# Patient Record
Sex: FEMALE | Race: WHITE | Employment: FULL TIME
[De-identification: names, ages, dates, MRNs, and addresses within clinical notes are randomized per-mention and may not be internally consistent; named-entity substitution may affect disease eponyms.]

---

## 2017-02-02 ENCOUNTER — OFFICE VISIT (OUTPATIENT)
Dept: FAMILY MEDICINE CLINIC | Facility: CLINIC | Age: 32
End: 2017-02-02

## 2017-02-02 VITALS
HEIGHT: 66 IN | SYSTOLIC BLOOD PRESSURE: 105 MMHG | TEMPERATURE: 98.3 F | BODY MASS INDEX: 27.8 KG/M2 | DIASTOLIC BLOOD PRESSURE: 70 MMHG | WEIGHT: 173 LBS | OXYGEN SATURATION: 99 % | HEART RATE: 94 BPM

## 2017-02-02 PROCEDURE — 99213 OFFICE O/P EST LOW 20 MIN: CPT | Performed by: NURSE PRACTITIONER

## 2017-02-02 RX ORDER — IBUPROFEN 800 MG/1
TABLET ORAL
Refills: 0 | COMMUNITY
Start: 2016-11-30 | End: 2017-02-23 | Stop reason: ALTCHOICE

## 2017-02-02 RX ORDER — VITAMIN A ACETATE, .BETA.-CAROTENE, ASCORBIC ACID, CHOLECALCIFEROL, .ALPHA.-TOCOPHEROL ACETATE, DL-, THIAMINE MONONITRATE, RIBOFLAVIN, NIACINAMIDE, PYRIDOXINE HYDROCHLORIDE, FOLIC ACID, CYANOCOBALAMIN, CALCIUM CARBONATE, FERROUS FUMARATE, ZINC OXIDE, AND CUPRIC OXIDE 2000; 2000; 120; 400; 22; 1.84; 3; 20; 10; 1; 12; 200; 27; 25; 2 [IU]/1; [IU]/1; MG/1; [IU]/1; MG/1; MG/1; MG/1; MG/1; MG/1; MG/1; UG/1; MG/1; MG/1; MG/1; MG/1
TABLET ORAL
COMMUNITY
Start: 2017-01-16 | End: 2017-10-24 | Stop reason: ALTCHOICE

## 2017-02-02 ASSESSMENT — ENCOUNTER SYMPTOMS
RHINORRHEA: 0
ABDOMINAL PAIN: 0
COUGH: 0
SHORTNESS OF BREATH: 0
SINUS PRESSURE: 0
EYE DISCHARGE: 0
BLOOD IN STOOL: 0
WHEEZING: 0

## 2017-02-10 ENCOUNTER — TELEPHONE (OUTPATIENT)
Dept: FAMILY MEDICINE CLINIC | Facility: CLINIC | Age: 32
End: 2017-02-10

## 2017-02-15 ENCOUNTER — TELEPHONE (OUTPATIENT)
Dept: FAMILY MEDICINE CLINIC | Facility: CLINIC | Age: 32
End: 2017-02-15

## 2017-02-23 ENCOUNTER — OFFICE VISIT (OUTPATIENT)
Dept: FAMILY MEDICINE CLINIC | Facility: CLINIC | Age: 32
End: 2017-02-23

## 2017-02-23 VITALS
HEIGHT: 66 IN | OXYGEN SATURATION: 98 % | TEMPERATURE: 98.2 F | SYSTOLIC BLOOD PRESSURE: 99 MMHG | DIASTOLIC BLOOD PRESSURE: 63 MMHG | HEART RATE: 97 BPM | BODY MASS INDEX: 27.16 KG/M2 | WEIGHT: 169 LBS

## 2017-02-23 DIAGNOSIS — Z00.00 PREVENTATIVE HEALTH CARE: ICD-10-CM

## 2017-02-23 DIAGNOSIS — R53.83 FATIGUE, UNSPECIFIED TYPE: ICD-10-CM

## 2017-02-23 PROCEDURE — 99213 OFFICE O/P EST LOW 20 MIN: CPT | Performed by: NURSE PRACTITIONER

## 2017-02-23 ASSESSMENT — ENCOUNTER SYMPTOMS
COUGH: 0
VISUAL CHANGE: 0
WHEEZING: 0
SHORTNESS OF BREATH: 0
ABDOMINAL PAIN: 0
BLOOD IN STOOL: 0
EYE DISCHARGE: 0
SINUS PRESSURE: 0
RHINORRHEA: 0

## 2017-03-30 ENCOUNTER — OFFICE VISIT (OUTPATIENT)
Dept: FAMILY MEDICINE CLINIC | Age: 32
End: 2017-03-30
Payer: COMMERCIAL

## 2017-03-30 VITALS
SYSTOLIC BLOOD PRESSURE: 106 MMHG | TEMPERATURE: 98.1 F | HEART RATE: 85 BPM | WEIGHT: 169.9 LBS | HEIGHT: 66 IN | OXYGEN SATURATION: 99 % | DIASTOLIC BLOOD PRESSURE: 70 MMHG | BODY MASS INDEX: 27.31 KG/M2

## 2017-03-30 DIAGNOSIS — R53.83 FATIGUE, UNSPECIFIED TYPE: ICD-10-CM

## 2017-03-30 DIAGNOSIS — F51.04 PSYCHOPHYSIOLOGICAL INSOMNIA: ICD-10-CM

## 2017-03-30 PROCEDURE — 99214 OFFICE O/P EST MOD 30 MIN: CPT | Performed by: NURSE PRACTITIONER

## 2017-03-30 RX ORDER — LANOLIN ALCOHOL/MO/W.PET/CERES
3 CREAM (GRAM) TOPICAL DAILY
Qty: 30 TABLET | Refills: 1 | Status: SHIPPED | OUTPATIENT
Start: 2017-03-30 | End: 2017-03-30 | Stop reason: CLARIF

## 2017-03-30 ASSESSMENT — ENCOUNTER SYMPTOMS: VISUAL CHANGE: 0

## 2017-05-05 ENCOUNTER — OFFICE VISIT (OUTPATIENT)
Dept: FAMILY MEDICINE CLINIC | Age: 32
End: 2017-05-05
Payer: COMMERCIAL

## 2017-05-05 VITALS
BODY MASS INDEX: 26.68 KG/M2 | OXYGEN SATURATION: 99 % | SYSTOLIC BLOOD PRESSURE: 100 MMHG | HEIGHT: 66 IN | TEMPERATURE: 97.9 F | HEART RATE: 78 BPM | DIASTOLIC BLOOD PRESSURE: 70 MMHG | WEIGHT: 166 LBS

## 2017-05-05 DIAGNOSIS — M54.42 ACUTE MIDLINE LOW BACK PAIN WITH BILATERAL SCIATICA: Primary | ICD-10-CM

## 2017-05-05 DIAGNOSIS — M54.42 CHRONIC MIDLINE LOW BACK PAIN WITH BILATERAL SCIATICA: ICD-10-CM

## 2017-05-05 DIAGNOSIS — G89.29 CHRONIC MIDLINE LOW BACK PAIN WITH BILATERAL SCIATICA: ICD-10-CM

## 2017-05-05 DIAGNOSIS — M54.41 CHRONIC MIDLINE LOW BACK PAIN WITH BILATERAL SCIATICA: ICD-10-CM

## 2017-05-05 DIAGNOSIS — M54.41 ACUTE MIDLINE LOW BACK PAIN WITH BILATERAL SCIATICA: Primary | ICD-10-CM

## 2017-05-05 PROCEDURE — 99213 OFFICE O/P EST LOW 20 MIN: CPT | Performed by: NURSE PRACTITIONER

## 2017-05-05 RX ORDER — DICLOFENAC POTASSIUM 50 MG/1
50 TABLET, FILM COATED ORAL 2 TIMES DAILY
Qty: 60 TABLET | Refills: 0 | Status: SHIPPED | OUTPATIENT
Start: 2017-05-05 | End: 2017-05-19 | Stop reason: ALTCHOICE

## 2017-05-05 ASSESSMENT — ENCOUNTER SYMPTOMS
COUGH: 0
SINUS PRESSURE: 0
BOWEL INCONTINENCE: 0
ABDOMINAL PAIN: 0
SHORTNESS OF BREATH: 0
WHEEZING: 0
BACK PAIN: 1
BLOOD IN STOOL: 0
RHINORRHEA: 0
EYE DISCHARGE: 0

## 2017-05-19 ENCOUNTER — TELEPHONE (OUTPATIENT)
Dept: FAMILY MEDICINE CLINIC | Age: 32
End: 2017-05-19

## 2017-05-19 ENCOUNTER — OFFICE VISIT (OUTPATIENT)
Dept: FAMILY MEDICINE CLINIC | Age: 32
End: 2017-05-19
Payer: COMMERCIAL

## 2017-05-19 VITALS
WEIGHT: 167 LBS | HEIGHT: 66 IN | DIASTOLIC BLOOD PRESSURE: 70 MMHG | BODY MASS INDEX: 26.84 KG/M2 | SYSTOLIC BLOOD PRESSURE: 101 MMHG | OXYGEN SATURATION: 99 % | TEMPERATURE: 97.4 F | HEART RATE: 72 BPM

## 2017-05-19 DIAGNOSIS — M48.061 SPINAL STENOSIS OF LUMBAR REGION: ICD-10-CM

## 2017-05-19 DIAGNOSIS — G89.29 CHRONIC MIDLINE LOW BACK PAIN WITH BILATERAL SCIATICA: Primary | ICD-10-CM

## 2017-05-19 DIAGNOSIS — M54.41 CHRONIC MIDLINE LOW BACK PAIN WITH BILATERAL SCIATICA: Primary | ICD-10-CM

## 2017-05-19 DIAGNOSIS — M54.42 CHRONIC MIDLINE LOW BACK PAIN WITH BILATERAL SCIATICA: Primary | ICD-10-CM

## 2017-05-19 PROCEDURE — 99213 OFFICE O/P EST LOW 20 MIN: CPT | Performed by: NURSE PRACTITIONER

## 2017-05-19 RX ORDER — AMOXICILLIN 875 MG/1
TABLET, COATED ORAL
COMMUNITY
Start: 2017-05-11 | End: 2017-06-02 | Stop reason: ALTCHOICE

## 2017-05-19 RX ORDER — BENZONATATE 200 MG/1
CAPSULE ORAL
COMMUNITY
Start: 2017-05-11 | End: 2017-09-22

## 2017-05-19 RX ORDER — LORATADINE 10 MG/1
TABLET ORAL
COMMUNITY
Start: 2017-05-11 | End: 2017-05-19 | Stop reason: ALTCHOICE

## 2017-05-19 ASSESSMENT — ENCOUNTER SYMPTOMS
RHINORRHEA: 0
BOWEL INCONTINENCE: 0
BLOOD IN STOOL: 0
SHORTNESS OF BREATH: 0
WHEEZING: 0
SINUS PRESSURE: 0
COUGH: 0
BACK PAIN: 1
EYE DISCHARGE: 0
ABDOMINAL PAIN: 0

## 2017-06-02 ENCOUNTER — OFFICE VISIT (OUTPATIENT)
Dept: FAMILY MEDICINE CLINIC | Age: 32
End: 2017-06-02
Payer: COMMERCIAL

## 2017-06-02 VITALS
HEART RATE: 64 BPM | BODY MASS INDEX: 26.84 KG/M2 | WEIGHT: 167 LBS | DIASTOLIC BLOOD PRESSURE: 72 MMHG | OXYGEN SATURATION: 98 % | HEIGHT: 66 IN | TEMPERATURE: 98.2 F | SYSTOLIC BLOOD PRESSURE: 110 MMHG

## 2017-06-02 DIAGNOSIS — M54.41 CHRONIC MIDLINE LOW BACK PAIN WITH BILATERAL SCIATICA: ICD-10-CM

## 2017-06-02 DIAGNOSIS — G89.29 CHRONIC MIDLINE LOW BACK PAIN WITH BILATERAL SCIATICA: ICD-10-CM

## 2017-06-02 DIAGNOSIS — R53.83 FATIGUE, UNSPECIFIED TYPE: ICD-10-CM

## 2017-06-02 DIAGNOSIS — M54.42 CHRONIC MIDLINE LOW BACK PAIN WITH BILATERAL SCIATICA: ICD-10-CM

## 2017-06-02 PROCEDURE — 99214 OFFICE O/P EST MOD 30 MIN: CPT | Performed by: NURSE PRACTITIONER

## 2017-06-02 ASSESSMENT — ENCOUNTER SYMPTOMS
RHINORRHEA: 0
NAUSEA: 1
ABDOMINAL PAIN: 0
BLOOD IN STOOL: 0
EYE DISCHARGE: 0
WHEEZING: 0
SHORTNESS OF BREATH: 0
BACK PAIN: 1
COUGH: 0
SINUS PRESSURE: 0
VISUAL CHANGE: 0

## 2017-09-22 ENCOUNTER — OFFICE VISIT (OUTPATIENT)
Dept: FAMILY MEDICINE CLINIC | Age: 32
End: 2017-09-22
Payer: COMMERCIAL

## 2017-09-22 ENCOUNTER — TELEPHONE (OUTPATIENT)
Dept: FAMILY MEDICINE CLINIC | Age: 32
End: 2017-09-22

## 2017-09-22 VITALS
SYSTOLIC BLOOD PRESSURE: 116 MMHG | BODY MASS INDEX: 27.47 KG/M2 | OXYGEN SATURATION: 98 % | TEMPERATURE: 98.2 F | DIASTOLIC BLOOD PRESSURE: 76 MMHG | HEIGHT: 66 IN | HEART RATE: 76 BPM | WEIGHT: 170.9 LBS

## 2017-09-22 DIAGNOSIS — G89.29 CHRONIC MIDLINE LOW BACK PAIN WITH BILATERAL SCIATICA: ICD-10-CM

## 2017-09-22 DIAGNOSIS — M54.41 CHRONIC MIDLINE LOW BACK PAIN WITH BILATERAL SCIATICA: ICD-10-CM

## 2017-09-22 DIAGNOSIS — E66.3 OVERWEIGHT (BMI 25.0-29.9): ICD-10-CM

## 2017-09-22 DIAGNOSIS — M54.42 CHRONIC MIDLINE LOW BACK PAIN WITH BILATERAL SCIATICA: ICD-10-CM

## 2017-09-22 PROCEDURE — 99214 OFFICE O/P EST MOD 30 MIN: CPT | Performed by: NURSE PRACTITIONER

## 2017-09-22 ASSESSMENT — ENCOUNTER SYMPTOMS
SINUS PRESSURE: 0
RHINORRHEA: 0
BLOOD IN STOOL: 0
BACK PAIN: 1
COUGH: 0
WHEEZING: 0
ABDOMINAL PAIN: 0
SHORTNESS OF BREATH: 0
EYE DISCHARGE: 0

## 2017-09-22 ASSESSMENT — PATIENT HEALTH QUESTIONNAIRE - PHQ9
SUM OF ALL RESPONSES TO PHQ QUESTIONS 1-9: 0
2. FEELING DOWN, DEPRESSED OR HOPELESS: 0
SUM OF ALL RESPONSES TO PHQ9 QUESTIONS 1 & 2: 0
1. LITTLE INTEREST OR PLEASURE IN DOING THINGS: 0

## 2017-10-05 LAB
BASOPHILS ABSOLUTE: ABNORMAL /ΜL
BASOPHILS RELATIVE PERCENT: ABNORMAL %
EOSINOPHILS ABSOLUTE: ABNORMAL /ΜL
EOSINOPHILS RELATIVE PERCENT: ABNORMAL %
HCT VFR BLD CALC: ABNORMAL % (ref 36–46)
HEMOGLOBIN: ABNORMAL G/DL (ref 12–16)
LYMPHOCYTES ABSOLUTE: ABNORMAL /ΜL
LYMPHOCYTES RELATIVE PERCENT: ABNORMAL %
MCH RBC QN AUTO: ABNORMAL PG
MCHC RBC AUTO-ENTMCNC: ABNORMAL G/DL
MCV RBC AUTO: ABNORMAL FL
MONOCYTES ABSOLUTE: ABNORMAL /ΜL
MONOCYTES RELATIVE PERCENT: ABNORMAL %
NEUTROPHILS ABSOLUTE: ABNORMAL /ΜL
NEUTROPHILS RELATIVE PERCENT: ABNORMAL %
PLATELET # BLD: ABNORMAL K/ΜL
PMV BLD AUTO: ABNORMAL FL
RBC # BLD: ABNORMAL 10^6/ΜL
WBC # BLD: ABNORMAL 10^3/ML

## 2017-10-06 ENCOUNTER — OFFICE VISIT (OUTPATIENT)
Dept: FAMILY MEDICINE CLINIC | Age: 32
End: 2017-10-06
Payer: COMMERCIAL

## 2017-10-06 ENCOUNTER — HOSPITAL ENCOUNTER (OUTPATIENT)
Age: 32
Setting detail: SPECIMEN
Discharge: HOME | End: 2017-10-06
Payer: COMMERCIAL

## 2017-10-06 VITALS
HEART RATE: 59 BPM | HEIGHT: 66 IN | SYSTOLIC BLOOD PRESSURE: 112 MMHG | OXYGEN SATURATION: 98 % | TEMPERATURE: 97.8 F | DIASTOLIC BLOOD PRESSURE: 70 MMHG | WEIGHT: 174 LBS | BODY MASS INDEX: 27.97 KG/M2

## 2017-10-06 DIAGNOSIS — D72.829 LEUKOCYTOSIS, UNSPECIFIED TYPE: ICD-10-CM

## 2017-10-06 DIAGNOSIS — K76.9 LIVER LESION: ICD-10-CM

## 2017-10-06 DIAGNOSIS — R10.11 RUQ PAIN: ICD-10-CM

## 2017-10-06 DIAGNOSIS — E87.6 HYPOKALEMIA: Primary | ICD-10-CM

## 2017-10-06 DIAGNOSIS — E87.6 HYPOKALEMIA: ICD-10-CM

## 2017-10-06 LAB — POTASSIUM SERPL-SCNC: 4 MMOL/L (ref 3.7–5.3)

## 2017-10-06 PROCEDURE — 99213 OFFICE O/P EST LOW 20 MIN: CPT | Performed by: NURSE PRACTITIONER

## 2017-10-06 RX ORDER — HYDROCODONE BITARTRATE AND ACETAMINOPHEN 5; 325 MG/1; MG/1
TABLET ORAL
COMMUNITY
Start: 2017-10-05 | End: 2017-10-24 | Stop reason: ALTCHOICE

## 2017-10-06 RX ORDER — FAMOTIDINE 20 MG/1
TABLET, FILM COATED ORAL
Refills: 0 | COMMUNITY
Start: 2017-10-01 | End: 2017-10-24 | Stop reason: ALTCHOICE

## 2017-10-06 RX ORDER — ONDANSETRON 4 MG/1
TABLET, ORALLY DISINTEGRATING ORAL
COMMUNITY
Start: 2017-10-05 | End: 2017-10-24 | Stop reason: ALTCHOICE

## 2017-10-06 RX ORDER — HYDROXYZINE PAMOATE 25 MG/1
CAPSULE ORAL
Refills: 0 | COMMUNITY
Start: 2017-10-01 | End: 2017-10-06 | Stop reason: ALTCHOICE

## 2017-10-06 RX ORDER — PREDNISONE 1 MG/1
TABLET ORAL
Refills: 0 | COMMUNITY
Start: 2017-10-01 | End: 2017-10-24 | Stop reason: ALTCHOICE

## 2017-10-06 ASSESSMENT — ENCOUNTER SYMPTOMS
CONSTIPATION: 0
COUGH: 0
SHORTNESS OF BREATH: 1
DIARRHEA: 0
ANAL BLEEDING: 0
EYE DISCHARGE: 0
ABDOMINAL PAIN: 1
NAUSEA: 1
BLOOD IN STOOL: 0

## 2017-10-06 NOTE — PROGRESS NOTES
through to her back with palp. ruq pain w /palp. No hepatosplenomegaly on exam    Musculoskeletal: Normal range of motion. She exhibits no edema. Neurological: She is alert and oriented to person, place, and time. Skin: Skin is warm and dry. Psychiatric: She has a normal mood and affect. Nursing note and vitals reviewed. /70  Pulse 59  Temp 97.8 °F (36.6 °C) (Oral)   Ht 5' 6\" (1.676 m)  Wt 174 lb (78.9 kg)  SpO2 98%  BMI 28.08 kg/m2    CBC:   Lab Results   Component Value Date    HGB 13.3 12/30/2015    HCT 41.4 12/30/2015     CMP:  No results found for: NA, K, CL, CO2, BUN, CREATININE, GFRAA, AGRATIO, LABGLOM, GLUCOSE, PROT, LABALBU, CALCIUM, BILITOT, ALKPHOS, AST, ALT  No results found for: LABA1C    STUDY: ABDOMEN AND PELVIS CT WITH CONTRAST                   CLINICAL HISTORY: Abdominal pain, upper abdominal pain radiating to          the back with shortness of breath                  COMPARISON: 6/17/2017                  TECHNIQUE: CT abdomen and pelvis was performed utilizing 5 mm axial          reconstructions following the uneventful administration of 97 cc          Omnipaque 300 nonionic intravenous contrast. Coronal and sagittal          reformatted images as well as delayed excretory phase images were          obtained and reviewed. Automated exposure control was utilized.                   FINDINGS:                  Abdomen:                  There is no pleural or pericardial effusion. There is mild dependent          change and atelectasis in the lower lungs.                  There is a hypervascular right hepatic lesion which measures          approximately 2.5 cm. This does not appear to be compatible with a          hemangioma. Findings could relate to focal nodular hyperplasia are          similar arterial enhancing lesion. MRI without and with contrast with          Eovist would be of additional diagnostic benefit for more definitive          characterization.  In addition a placed in this encounter. Patient given verbal and/or written educational instructions. Follow up as directed. I have reviewed and agree with the nursing documentation.     Electronically signed by Jeannine Hickey NP on 10/6/2017 at 9:12 AM

## 2017-10-06 NOTE — MR AVS SNAPSHOT
After Visit Summary             Trung Khalil   10/6/2017 8:45 AM   Office Visit    Description:  Female : 1985   Provider:  Sherrell Love NP   Department:  [facility]              Your Follow-Up and Future Appointments         Below is a list of your follow-up and future appointments. This may not be a complete list as you may have made appointments directly with providers that we are not aware of or your providers may have made some for you. Please call your providers to confirm appointments. It is important to keep your appointments. Please bring your current insurance card, photo ID, co-pay, and all medication bottles to your appointment. If self-pay, payment is expected at the time of service. Your To-Do List     Future Appointments Provider Department Dept Phone    10/20/2017 8:45 AM Sherrell Love NP [facility] [phone]    Please arrive 15 minutes prior to appointment, bring photo ID and insurance card. 2017 11:15 AM Sherrell Love NP [facility] [phone]    Please arrive 15 minutes prior to appointment, bring photo ID and insurance card. Future Orders Complete By Expires    MRI ABDOMEN W WO CONTRAST [[id number] Custom]  10/6/2017 10/6/2018    Comments:    MRI Liver ; with Eovist    Potassium [GRL192 Custom]  10/6/2017 10/6/2018    Follow-Up    Return in about 2 weeks (around 10/20/2017) for mri f/u .          Information from Your Visit        Department     Name Address Phone Fax    [facility] [address] [facility] [phone]      You Were Seen for:         Comments    Hypokalemia   [[id number]]         Vital Signs     Blood Pressure Pulse Temperature Height Weight Oxygen Saturation    112/70 59 97.8 °F (36.6 °C) (Oral) 5' 6\" (1.676 m) 174 lb (78.9 kg) 98%    Body Mass Index Smoking Status 28.08 kg/m2 Former Smoker          Additional Information about your Body Mass Index (BMI)           Your BMI as listed above is considered overweight (25.0-29.9). BMI is an estimate of body fat, calculated from your height and weight. The higher your BMI, the greater your risk of heart disease, high blood pressure, type 2 diabetes, stroke, gallstones, arthritis, sleep apnea, and certain cancers. BMI is not perfect. It may overestimate body fat in athletes and people who are more muscular. If your body fat is high you can improve your BMI by decreasing your calorie intake and becoming more physically active. Learn more at: diaDexus.uk             Today's Medication Changes          These changes are accurate as of: 10/6/17  9:15 AM.  If you have any questions, ask your nurse or doctor. STOP taking these medications           hydrOXYzine 25 MG capsule   Commonly known as:  VISTARIL   Stopped by:  Jeannine Hickey NP               Your Current Medications Are              predniSONE (DELTASONE) 5 MG tablet take 8 tablets by mouth daily for 3 days then 4 tablets daily for. ..  (REFER TO PRESCRIPTION NOTES).     ondansetron (ZOFRAN-ODT) 4 MG disintegrating tablet     HYDROcodone-acetaminophen (NORCO) 5-325 MG per tablet     famotidine (PEPCID) 20 MG tablet take 1 tablet by mouth twice a day    sertraline (ZOLOFT) 50 MG tablet Take 1 tablet by mouth daily    Prenatal Vit-Fe Fumarate-FA (PNV PRENATAL PLUS MULTIVITAMIN) 27-1 MG TABS       Allergies              Latex     Redness and swelling    Tape Tiffany Profit Tape] Other (See Comments)    Plastic tape causes skin irritation         Additional Information        Basic Information     Date Of Birth Sex Race Ethnicity Preferred Language    1985 Female White Non-/Non  English      Problem List as of 10/6/2017  Date Reviewed: 10/6/2017                Body mass index (BMI) of 25.0 to 29.9

## 2017-10-10 ENCOUNTER — TELEPHONE (OUTPATIENT)
Dept: FAMILY MEDICINE CLINIC | Age: 32
End: 2017-10-10

## 2017-10-13 RX ORDER — TRAMADOL HYDROCHLORIDE 50 MG/1
TABLET ORAL
Qty: 24 TABLET | Refills: 0 | Status: SHIPPED | OUTPATIENT
Start: 2017-10-13 | End: 2018-03-20 | Stop reason: ALTCHOICE

## 2017-10-13 RX ORDER — HYDROXYZINE PAMOATE 25 MG/1
25 CAPSULE ORAL 3 TIMES DAILY PRN
Qty: 15 CAPSULE | Refills: 0 | Status: SHIPPED | OUTPATIENT
Start: 2017-10-13 | End: 2017-10-24 | Stop reason: ALTCHOICE

## 2017-10-18 ENCOUNTER — HOSPITAL ENCOUNTER (OUTPATIENT)
Dept: MRI IMAGING | Age: 32
Discharge: HOME | End: 2017-10-18
Payer: COMMERCIAL

## 2017-10-18 DIAGNOSIS — R10.11 RUQ PAIN: ICD-10-CM

## 2017-10-18 DIAGNOSIS — K76.9 LIVER LESION: ICD-10-CM

## 2017-10-18 PROCEDURE — A9581 GADOXETATE DISODIUM INJ: HCPCS | Performed by: NURSE PRACTITIONER

## 2017-10-18 PROCEDURE — 6360000004 HC RX CONTRAST MEDICATION: Performed by: NURSE PRACTITIONER

## 2017-10-18 PROCEDURE — 2580000003 HC RX 258: Performed by: NURSE PRACTITIONER

## 2017-10-18 PROCEDURE — 74183 MRI ABD W/O CNTR FLWD CNTR: CPT

## 2017-10-18 RX ORDER — 0.9 % SODIUM CHLORIDE 0.9 %
100 INTRAVENOUS SOLUTION INTRAVENOUS ONCE
Status: COMPLETED | OUTPATIENT
Start: 2017-10-18 | End: 2017-10-18

## 2017-10-18 RX ADMIN — GADOXETATE DISODIUM 8 ML: 181.43 INJECTION, SOLUTION INTRAVENOUS at 07:37

## 2017-10-18 RX ADMIN — SODIUM CHLORIDE 50 ML: 9 INJECTION, SOLUTION INTRAVENOUS at 07:38

## 2017-10-24 ENCOUNTER — OFFICE VISIT (OUTPATIENT)
Dept: FAMILY MEDICINE CLINIC | Age: 32
End: 2017-10-24
Payer: COMMERCIAL

## 2017-10-24 VITALS
HEIGHT: 66 IN | OXYGEN SATURATION: 98 % | HEART RATE: 75 BPM | DIASTOLIC BLOOD PRESSURE: 70 MMHG | TEMPERATURE: 98.1 F | SYSTOLIC BLOOD PRESSURE: 91 MMHG | BODY MASS INDEX: 27.64 KG/M2 | WEIGHT: 172 LBS

## 2017-10-24 DIAGNOSIS — D18.03 HEMANGIOMA OF LIVER: ICD-10-CM

## 2017-10-24 DIAGNOSIS — R06.6 SPASM OF DIAPHRAGM: Primary | ICD-10-CM

## 2017-10-24 PROCEDURE — 1036F TOBACCO NON-USER: CPT | Performed by: NURSE PRACTITIONER

## 2017-10-24 PROCEDURE — G8417 CALC BMI ABV UP PARAM F/U: HCPCS | Performed by: NURSE PRACTITIONER

## 2017-10-24 PROCEDURE — G8484 FLU IMMUNIZE NO ADMIN: HCPCS | Performed by: NURSE PRACTITIONER

## 2017-10-24 PROCEDURE — G8427 DOCREV CUR MEDS BY ELIG CLIN: HCPCS | Performed by: NURSE PRACTITIONER

## 2017-10-24 PROCEDURE — 99213 OFFICE O/P EST LOW 20 MIN: CPT | Performed by: NURSE PRACTITIONER

## 2017-10-24 RX ORDER — TIZANIDINE 2 MG/1
2 TABLET ORAL EVERY 8 HOURS PRN
Qty: 21 TABLET | Refills: 0 | Status: SHIPPED | OUTPATIENT
Start: 2017-10-24 | End: 2018-03-20 | Stop reason: ALTCHOICE

## 2017-10-24 ASSESSMENT — ENCOUNTER SYMPTOMS
SHORTNESS OF BREATH: 0
CHEST TIGHTNESS: 1
EYE DISCHARGE: 0
BLOOD IN STOOL: 0
ABDOMINAL PAIN: 1
COUGH: 0
NAUSEA: 1

## 2017-10-24 NOTE — PROGRESS NOTES
[facility] [address] [place] [address]  Dept: [phone]  Dept Fax: [phone]    Galo Burgess is a 34 y.o. female who presents today for her medical conditions/complaints as noted below. Galo Burgess is c/o of Abdominal Pain and Discuss Labs        HPI:     Patient presents with:  Abdominal Pain b/l upper abd, goes into back and shoulder . Same as ER pain. now 4/10 , worst 10/10 . Becomes Hunched over, cannot relieve  pain . Duration : 2 hrs. Last episode 2 d ago. 10/ 10 pain occurs qod. Discuss Labs         Past Medical History:   Diagnosis Date    Anxiety     HPV (human papilloma virus) anogenital infection     Rheumatic fever     Spinal stenosis     Wears glasses       Past Surgical History:   Procedure Laterality Date    CHOLECYSTECTOMY      DENTAL SURGERY      removed some teeth at age 1   [facility] TONSILLECTOMY AND ADENOIDECTOMY Bilateral 1/7/2016       Family History   Problem Relation Age of Onset    Diabetes Father     High Blood Pressure Father     High Cholesterol Father        Social History   Substance Use Topics    Smoking status: Former Smoker     Packs/day: 0.50     Years: 11.00     Types: Cigarettes    Smokeless tobacco: Never Used      Comment: off and on 11 years    Alcohol use No      Current Outpatient Prescriptions   Medication Sig Dispense Refill    tiZANidine (ZANAFLEX) 2 MG tablet Take 1 tablet by mouth every 8 hours as needed (muscle spasm) 21 tablet 0    sertraline (ZOLOFT) 50 MG tablet Take 1 tablet by mouth daily 30 tablet 1    traMADol (ULTRAM) 50 MG tablet 1 tablet  every 6 hours 24 tablet 0     No current facility-administered medications for this visit.       Allergies   Allergen Reactions    Latex      Redness and swelling    Tape Barbi Mario Tape] Other (See Comments)     Plastic tape causes skin irritation         Subjective:      Review of Systems   Constitutional: Negative for activity change, chills, fatigue and fever. Eyes: Negative for discharge and visual disturbance. Respiratory: Positive for chest tightness ( wtih pain in abdomen ). Negative for cough and shortness of breath. Cardiovascular: Negative for chest pain and leg swelling. Gastrointestinal: Positive for abdominal pain and nausea. Negative for blood in stool. No belching/ burning    Endocrine: Negative for cold intolerance and heat intolerance. Genitourinary: Negative for dysuria and flank pain. Musculoskeletal: Negative for joint swelling and myalgias. Skin: Negative for pallor and rash. Neurological: Positive for dizziness. Negative for headaches. Psychiatric/Behavioral: Negative for hallucinations and suicidal ideas. Objective:     Physical Exam   Constitutional: She is oriented to person, place, and time. She appears well-developed and well-nourished. BP 91/70   Pulse 75   Temp 98.1 °F (36.7 °C) (Oral)   Ht 5' 6\" (1.676 m)   Wt 172 lb (78 kg)   SpO2 98%   BMI 27.76 kg/m²      HENT:   Head: Normocephalic and atraumatic. Eyes: Conjunctivae and EOM are normal. No scleral icterus. Neck: Neck supple. Cardiovascular: Normal rate, regular rhythm, normal heart sounds and intact distal pulses. Pulmonary/Chest: Effort normal and breath sounds normal. She has no wheezes. She has no rales. Abdominal: Soft. Bowel sounds are normal. There is tenderness (b/l upper quad and Ruq with palp ). Musculoskeletal: Normal range of motion. She exhibits no edema. Neurological: She is alert and oriented to person, place, and time. Skin: Skin is warm and dry. Psychiatric: She has a normal mood and affect. Nursing note and vitals reviewed.     BP 91/70   Pulse 75   Temp 98.1 °F (36.7 °C) (Oral)   Ht 5' 6\" (1.676 m)   Wt 172 lb (78 kg)   SpO2 98%   BMI 27.76 kg/m²     CBC:   Lab Results   Component Value Date    HGB 13.3 12/30/2015    HCT 41.4 12/30/2015     CMP:    Lab Results   Component health maintenance  Continue current medications, diet and exercise. Discussed use, benefit, and side effects of prescribed medications. Barriers to medication compliance addressed. Patient given educational materials - see patient instructions  Was a self-tracking handout given in paper form or via [employer]? Yes    Requested Prescriptions     Signed Prescriptions Disp Refills    tiZANidine (ZANAFLEX) 2 MG tablet 21 tablet 0     Sig: Take 1 tablet by mouth every 8 hours as needed (muscle spasm)       All patient questions answered. Patient voiced understanding. Quality Measures    Body mass index is 27.76 kg/m². Elevated. Weight control planned discussed Healthy diet and regular exercise. BP: 91/70 Blood pressure is low. Treatment plan consists of No treatment change needed. On low side today, will monitor. No results found for: LDLCALC, LDLCHOLESTEROL, LDLDIRECT (goal LDL reduction with dx if diabetes is 50% LDL reduction)      PHQ Scores 9/22/2017   PHQ2 Score 0   PHQ9 Score 0     Interpretation of Total Score Depression Severity: 1-4 = Minimal depression, 5-9 = Mild depression, 10-14 = Moderate depression, 15-19 = Moderately severe depression, 20-27 = Severe depression    Return in about 3 weeks (around 11/14/2017). No orders of the defined types were placed in this encounter. Orders Placed This Encounter   Medications    tiZANidine (ZANAFLEX) 2 MG tablet     Sig: Take 1 tablet by mouth every 8 hours as needed (muscle spasm)     Dispense:  21 tablet     Refill:  0       Patient given verbal and/or written educational instructions. Follow up as directed. I have reviewed and agree with the nursing documentation.     Electronically signed by Devaughn Kuhn NP on 10/24/2017 at 1:27 PM

## 2017-11-20 DIAGNOSIS — R53.83 FATIGUE, UNSPECIFIED TYPE: ICD-10-CM

## 2017-11-20 DIAGNOSIS — Z00.00 PREVENTATIVE HEALTH CARE: ICD-10-CM

## 2017-12-19 ENCOUNTER — OFFICE VISIT (OUTPATIENT)
Dept: FAMILY MEDICINE CLINIC | Age: 32
End: 2017-12-19
Payer: COMMERCIAL

## 2017-12-19 VITALS
SYSTOLIC BLOOD PRESSURE: 122 MMHG | RESPIRATION RATE: 16 BRPM | HEART RATE: 68 BPM | DIASTOLIC BLOOD PRESSURE: 75 MMHG | OXYGEN SATURATION: 98 % | HEIGHT: 66 IN | BODY MASS INDEX: 27.32 KG/M2 | WEIGHT: 170 LBS

## 2017-12-19 DIAGNOSIS — R20.2 NUMBNESS AND TINGLING: ICD-10-CM

## 2017-12-19 DIAGNOSIS — R20.0 NUMBNESS AND TINGLING: ICD-10-CM

## 2017-12-19 DIAGNOSIS — G89.29 CHRONIC BILATERAL LOW BACK PAIN WITH LEFT-SIDED SCIATICA: ICD-10-CM

## 2017-12-19 DIAGNOSIS — F34.1 DYSTHYMIA: Primary | ICD-10-CM

## 2017-12-19 DIAGNOSIS — M54.42 CHRONIC BILATERAL LOW BACK PAIN WITH LEFT-SIDED SCIATICA: ICD-10-CM

## 2017-12-19 DIAGNOSIS — R20.0 NUMBNESS AND TINGLING: Primary | ICD-10-CM

## 2017-12-19 DIAGNOSIS — M48.00 SPINAL STENOSIS, UNSPECIFIED SPINAL REGION: ICD-10-CM

## 2017-12-19 DIAGNOSIS — R20.2 NUMBNESS AND TINGLING: Primary | ICD-10-CM

## 2017-12-19 PROCEDURE — 99214 OFFICE O/P EST MOD 30 MIN: CPT | Performed by: NURSE PRACTITIONER

## 2017-12-19 PROCEDURE — 1036F TOBACCO NON-USER: CPT | Performed by: NURSE PRACTITIONER

## 2017-12-19 PROCEDURE — G8427 DOCREV CUR MEDS BY ELIG CLIN: HCPCS | Performed by: NURSE PRACTITIONER

## 2017-12-19 PROCEDURE — G8417 CALC BMI ABV UP PARAM F/U: HCPCS | Performed by: NURSE PRACTITIONER

## 2017-12-19 PROCEDURE — G8484 FLU IMMUNIZE NO ADMIN: HCPCS | Performed by: NURSE PRACTITIONER

## 2017-12-19 RX ORDER — GABAPENTIN 100 MG/1
100 CAPSULE ORAL 2 TIMES DAILY
Qty: 60 CAPSULE | Refills: 1 | Status: SHIPPED | OUTPATIENT
Start: 2017-12-19 | End: 2018-03-20 | Stop reason: DRUGHIGH

## 2017-12-19 RX ORDER — ONDANSETRON 4 MG/1
TABLET, FILM COATED ORAL
COMMUNITY
Start: 2017-12-16 | End: 2018-03-20 | Stop reason: ALTCHOICE

## 2017-12-19 ASSESSMENT — ENCOUNTER SYMPTOMS
ABDOMINAL PAIN: 0
RHINORRHEA: 0
BLOOD IN STOOL: 0
EYE DISCHARGE: 0
WHEEZING: 0
BACK PAIN: 1
COUGH: 0
SHORTNESS OF BREATH: 0
SINUS PRESSURE: 0

## 2017-12-19 ASSESSMENT — PATIENT HEALTH QUESTIONNAIRE - PHQ9
2. FEELING DOWN, DEPRESSED OR HOPELESS: 0
SUM OF ALL RESPONSES TO PHQ9 QUESTIONS 1 & 2: 0
SUM OF ALL RESPONSES TO PHQ QUESTIONS 1-9: 0
1. LITTLE INTEREST OR PLEASURE IN DOING THINGS: 0

## 2017-12-19 NOTE — PROGRESS NOTES
Visit Information    Have you changed or started any medications since your last visit including any over-the-counter medicines, vitamins, or herbal medicines? no   Have you stopped taking any of your medications? Is so, why? -  yes - not taking zanaflex or ultram  Are you having any side effects from any of your medications? - no    Have you seen any other physician or provider since your last visit? yes - er Friday/ diarrhea or vomitting   Have you had any other diagnostic tests since your last visit?  no   Have you been seen in the emergency room and/or had an admission in a hospital since we last saw you?  yes - dehydration (went to er)   Have you had your routine dental cleaning in the past 6 months?  yes -      Do you have an active MyChart account? If no, what is the barrier?   Yes    Patient Care Team:  Karina Miranda NP as PCP - General (Certified Nurse Practitioner)  Radha Plummer MD as Consulting Physician (Gastroenterology)    Medical History Review  Past Medical, Family, and Social History reviewed and does contribute to the patient presenting condition    Health Maintenance   Topic Date Due    HIV screen  05/06/1999    Flu vaccine (1) 09/01/2017    Cervical cancer screen  05/26/2018    DTaP/Tdap/Td vaccine (2 - Td) 09/01/2023

## 2017-12-19 NOTE — PROGRESS NOTES
medications for this visit. Allergies   Allergen Reactions    Latex      Redness and swelling    Tape Servando Reichmann Tape] Other (See Comments)     Plastic tape causes skin irritation         Subjective:      Review of Systems   Constitutional: Negative for activity change, chills, fatigue and fever. HENT: Negative for congestion, postnasal drip, rhinorrhea and sinus pressure. Eyes: Negative for discharge and visual disturbance. Respiratory: Negative for cough, shortness of breath and wheezing. Cardiovascular: Negative for chest pain and leg swelling. Gastrointestinal: Negative for abdominal pain and blood in stool. Endocrine: Negative for cold intolerance and heat intolerance. Genitourinary: Negative for dysuria, flank pain and hematuria. Musculoskeletal: Positive for back pain. Negative for joint swelling and myalgias. Skin: Negative for pallor and rash. Neurological: Positive for numbness. Negative for headaches. Psychiatric/Behavioral: Positive for dysphoric mood (better on meds ). Negative for hallucinations and suicidal ideas. Objective:     Physical Exam   Constitutional: She is oriented to person, place, and time. She appears well-developed and well-nourished. HENT:   Head: Normocephalic and atraumatic. Eyes: EOM are normal.   Neck: Carotid bruit is not present. Cardiovascular: Normal rate, regular rhythm and normal heart sounds. Pulses:       Carotid pulses are 2+ on the right side, and 2+ on the left side. Radial pulses are 2+ on the right side, and 2+ on the left side. Dorsalis pedis pulses are 2+ on the right side, and 2+ on the left side. Pulmonary/Chest: Effort normal and breath sounds normal.   Abdominal: Soft. Bowel sounds are normal.   Musculoskeletal: Normal range of motion. She exhibits no edema. Neurological: She is alert and oriented to person, place, and time. Skin: Skin is warm and dry. Psychiatric: She has a normal mood and affect. planned discussed Healthy diet and regular exercise. BP: 122/75 Blood pressure is normal. Treatment plan consists of No treatment change needed. No results found for: LDLCALC, LDLCHOLESTEROL, LDLDIRECT (goal LDL reduction with dx if diabetes is 50% LDL reduction)      PHQ Scores 12/19/2017 9/22/2017   PHQ2 Score 0 0   PHQ9 Score 0 0     Interpretation of Total Score Depression Severity: 1-4 = Minimal depression, 5-9 = Mild depression, 10-14 = Moderate depression, 15-19 = Moderately severe depression, 20-27 = Severe depression    No Follow-up on file. No orders of the defined types were placed in this encounter. Orders Placed This Encounter   Medications    sertraline (ZOLOFT) 50 MG tablet     Sig: Take 1 tablet by mouth daily     Dispense:  30 tablet     Refill:  2    diclofenac (VOLTAREN) 50 MG EC tablet     Sig: Take 1 tablet by mouth 2 times daily     Dispense:  60 tablet     Refill:  1    gabapentin (NEURONTIN) 100 MG capsule     Sig: Take 1 capsule by mouth 2 times daily     Dispense:  60 capsule     Refill:  1       Patient given verbal and/or written educational instructions. Follow up as directed. I have reviewed and agree with the nursing documentation.     Electronically signed by Deanna Aldana NP on 12/19/2017 at 11:55 AM

## 2018-03-20 ENCOUNTER — OFFICE VISIT (OUTPATIENT)
Dept: FAMILY MEDICINE CLINIC | Age: 33
End: 2018-03-20
Payer: COMMERCIAL

## 2018-03-20 VITALS
OXYGEN SATURATION: 97 % | BODY MASS INDEX: 27.53 KG/M2 | HEART RATE: 65 BPM | WEIGHT: 171.3 LBS | SYSTOLIC BLOOD PRESSURE: 124 MMHG | TEMPERATURE: 97 F | HEIGHT: 66 IN | RESPIRATION RATE: 13 BRPM | DIASTOLIC BLOOD PRESSURE: 86 MMHG

## 2018-03-20 DIAGNOSIS — M54.41 CHRONIC BILATERAL LOW BACK PAIN WITH BILATERAL SCIATICA: ICD-10-CM

## 2018-03-20 DIAGNOSIS — M54.42 CHRONIC BILATERAL LOW BACK PAIN WITH BILATERAL SCIATICA: ICD-10-CM

## 2018-03-20 DIAGNOSIS — G89.29 CHRONIC BILATERAL LOW BACK PAIN WITH BILATERAL SCIATICA: ICD-10-CM

## 2018-03-20 DIAGNOSIS — F32.A MODERATE DEPRESSIVE DISORDER: ICD-10-CM

## 2018-03-20 PROCEDURE — G8427 DOCREV CUR MEDS BY ELIG CLIN: HCPCS | Performed by: NURSE PRACTITIONER

## 2018-03-20 PROCEDURE — 99213 OFFICE O/P EST LOW 20 MIN: CPT | Performed by: NURSE PRACTITIONER

## 2018-03-20 PROCEDURE — G8484 FLU IMMUNIZE NO ADMIN: HCPCS | Performed by: NURSE PRACTITIONER

## 2018-03-20 PROCEDURE — G8417 CALC BMI ABV UP PARAM F/U: HCPCS | Performed by: NURSE PRACTITIONER

## 2018-03-20 PROCEDURE — 1036F TOBACCO NON-USER: CPT | Performed by: NURSE PRACTITIONER

## 2018-03-20 RX ORDER — SERTRALINE HYDROCHLORIDE 25 MG/1
25 TABLET, FILM COATED ORAL DAILY
Qty: 30 TABLET | Refills: 0 | Status: SHIPPED | OUTPATIENT
Start: 2018-03-20 | End: 2018-07-06 | Stop reason: DRUGHIGH

## 2018-03-20 RX ORDER — GABAPENTIN 300 MG/1
300 CAPSULE ORAL 3 TIMES DAILY
COMMUNITY
End: 2018-07-06 | Stop reason: ALTCHOICE

## 2018-03-20 ASSESSMENT — ENCOUNTER SYMPTOMS
SHORTNESS OF BREATH: 0
EYE DISCHARGE: 0
COUGH: 0
RHINORRHEA: 1
BLOOD IN STOOL: 0
ABDOMINAL PAIN: 0

## 2018-03-20 ASSESSMENT — PATIENT HEALTH QUESTIONNAIRE - PHQ9
7. TROUBLE CONCENTRATING ON THINGS, SUCH AS READING THE NEWSPAPER OR WATCHING TELEVISION: 1
5. POOR APPETITE OR OVEREATING: 3
4. FEELING TIRED OR HAVING LITTLE ENERGY: 3
8. MOVING OR SPEAKING SO SLOWLY THAT OTHER PEOPLE COULD HAVE NOTICED. OR THE OPPOSITE, BEING SO FIGETY OR RESTLESS THAT YOU HAVE BEEN MOVING AROUND A LOT MORE THAN USUAL: 0
9. THOUGHTS THAT YOU WOULD BE BETTER OFF DEAD, OR OF HURTING YOURSELF: 0
10. IF YOU CHECKED OFF ANY PROBLEMS, HOW DIFFICULT HAVE THESE PROBLEMS MADE IT FOR YOU TO DO YOUR WORK, TAKE CARE OF THINGS AT HOME, OR GET ALONG WITH OTHER PEOPLE: 1
2. FEELING DOWN, DEPRESSED OR HOPELESS: 1
SUM OF ALL RESPONSES TO PHQ QUESTIONS 1-9: 13
3. TROUBLE FALLING OR STAYING ASLEEP: 3
SUM OF ALL RESPONSES TO PHQ9 QUESTIONS 1 & 2: 2
1. LITTLE INTEREST OR PLEASURE IN DOING THINGS: 1
6. FEELING BAD ABOUT YOURSELF - OR THAT YOU ARE A FAILURE OR HAVE LET YOURSELF OR YOUR FAMILY DOWN: 1

## 2018-03-20 NOTE — PROGRESS NOTES
[facility] [id number]  [address] [place] [address]  Dept: [phone]  Dept Fax: [phone]    Zachery Erickson is a 34 y.o. female who presents today for her medical conditions/complaints as noted below. Zachery Erickson is c/o of Back Pain; Discuss Medications (gabapentin and zoloft); and Health Maintenance (will discuss with provider)        HPI:     Patient presents with:  Back Pain neuro wanted her to see [facility] PT, nerve, needs to make appt. Discuss Medications: gabapentin and zoloft, getting irritable, short tempered, losing job dtr 1 and 8. Health Maintenance: will discuss with provider            Past Medical History:   Diagnosis Date    Anxiety     HPV (human papilloma virus) anogenital infection     Rheumatic fever     Spinal stenosis     Wears glasses       Past Surgical History:   Procedure Laterality Date    CHOLECYSTECTOMY      DENTAL SURGERY      removed some teeth at age 1   [facility] TONSILLECTOMY AND ADENOIDECTOMY Bilateral 1/7/2016       Family History   Problem Relation Age of Onset    Diabetes Father     High Blood Pressure Father     High Cholesterol Father        Social History   Substance Use Topics    Smoking status: Former Smoker     Packs/day: 0.50     Years: 11.00     Types: Cigarettes    Smokeless tobacco: Never Used      Comment: off and on 11 years    Alcohol use No      Current Outpatient Prescriptions   Medication Sig Dispense Refill    gabapentin (NEURONTIN) 300 MG capsule Take 300 mg by mouth 3 times daily.  sertraline (ZOLOFT) 50 MG tablet Take 1 tablet by mouth daily 30 tablet 0    sertraline (ZOLOFT) 25 MG tablet Take 1 tablet by mouth daily 30 tablet 0     No current facility-administered medications for this visit.       Allergies   Allergen Reactions    Latex      Redness and swelling    Tape Heron Chanel Tape] Other (See Comments)     Plastic tape causes skin irritation         Subjective:      Review of

## 2018-04-24 ENCOUNTER — OFFICE VISIT (OUTPATIENT)
Dept: FAMILY MEDICINE CLINIC | Age: 33
End: 2018-04-24
Payer: COMMERCIAL

## 2018-04-24 VITALS
TEMPERATURE: 97.9 F | SYSTOLIC BLOOD PRESSURE: 126 MMHG | WEIGHT: 172.8 LBS | HEIGHT: 66 IN | DIASTOLIC BLOOD PRESSURE: 84 MMHG | HEART RATE: 88 BPM | OXYGEN SATURATION: 99 % | BODY MASS INDEX: 27.77 KG/M2

## 2018-04-24 DIAGNOSIS — R20.2 NUMBNESS AND TINGLING OF BOTH LOWER EXTREMITIES: ICD-10-CM

## 2018-04-24 DIAGNOSIS — F41.9 ANXIETY: Primary | ICD-10-CM

## 2018-04-24 DIAGNOSIS — F32.A MODERATE DEPRESSIVE DISORDER: ICD-10-CM

## 2018-04-24 DIAGNOSIS — R20.0 NUMBNESS AND TINGLING OF BOTH LOWER EXTREMITIES: ICD-10-CM

## 2018-04-24 DIAGNOSIS — M54.42 CHRONIC BILATERAL LOW BACK PAIN WITH BILATERAL SCIATICA: ICD-10-CM

## 2018-04-24 DIAGNOSIS — R53.83 FATIGUE, UNSPECIFIED TYPE: ICD-10-CM

## 2018-04-24 DIAGNOSIS — M54.41 CHRONIC BILATERAL LOW BACK PAIN WITH BILATERAL SCIATICA: ICD-10-CM

## 2018-04-24 DIAGNOSIS — G89.29 CHRONIC BILATERAL LOW BACK PAIN WITH BILATERAL SCIATICA: ICD-10-CM

## 2018-04-24 PROCEDURE — G8427 DOCREV CUR MEDS BY ELIG CLIN: HCPCS | Performed by: NURSE PRACTITIONER

## 2018-04-24 PROCEDURE — G8417 CALC BMI ABV UP PARAM F/U: HCPCS | Performed by: NURSE PRACTITIONER

## 2018-04-24 PROCEDURE — 99214 OFFICE O/P EST MOD 30 MIN: CPT | Performed by: NURSE PRACTITIONER

## 2018-04-24 PROCEDURE — 1036F TOBACCO NON-USER: CPT | Performed by: NURSE PRACTITIONER

## 2018-04-24 RX ORDER — CYCLOBENZAPRINE HCL 10 MG
TABLET ORAL
COMMUNITY
Start: 2018-04-15 | End: 2018-04-24 | Stop reason: ALTCHOICE

## 2018-04-24 ASSESSMENT — ENCOUNTER SYMPTOMS
EYE DISCHARGE: 0
BACK PAIN: 1
BLOOD IN STOOL: 0
COUGH: 0
SHORTNESS OF BREATH: 0
ABDOMINAL PAIN: 0

## 2018-04-24 ASSESSMENT — PATIENT HEALTH QUESTIONNAIRE - PHQ9
1. LITTLE INTEREST OR PLEASURE IN DOING THINGS: 0
SUM OF ALL RESPONSES TO PHQ9 QUESTIONS 1 & 2: 0
SUM OF ALL RESPONSES TO PHQ QUESTIONS 1-9: 0
2. FEELING DOWN, DEPRESSED OR HOPELESS: 0

## 2018-07-06 ENCOUNTER — OFFICE VISIT (OUTPATIENT)
Dept: FAMILY MEDICINE CLINIC | Age: 33
End: 2018-07-06
Payer: COMMERCIAL

## 2018-07-06 ENCOUNTER — TELEPHONE (OUTPATIENT)
Dept: FAMILY MEDICINE CLINIC | Age: 33
End: 2018-07-06

## 2018-07-06 VITALS
HEIGHT: 66 IN | DIASTOLIC BLOOD PRESSURE: 76 MMHG | TEMPERATURE: 98.5 F | WEIGHT: 175.1 LBS | BODY MASS INDEX: 28.14 KG/M2 | HEART RATE: 74 BPM | OXYGEN SATURATION: 97 % | SYSTOLIC BLOOD PRESSURE: 124 MMHG

## 2018-07-06 DIAGNOSIS — M19.90 OTHER TYPE OF OSTEOARTHRITIS, UNSPECIFIED SITE: ICD-10-CM

## 2018-07-06 DIAGNOSIS — F33.1 MAJOR DEPRESSIVE DISORDER, RECURRENT, MODERATE (HCC): Primary | ICD-10-CM

## 2018-07-06 DIAGNOSIS — M79.7 FIBROMYALGIA: ICD-10-CM

## 2018-07-06 DIAGNOSIS — R20.2 NUMBNESS AND TINGLING OF BOTH LOWER EXTREMITIES: ICD-10-CM

## 2018-07-06 DIAGNOSIS — F43.21 GRIEF REACTION: ICD-10-CM

## 2018-07-06 DIAGNOSIS — R20.0 NUMBNESS AND TINGLING OF BOTH LOWER EXTREMITIES: ICD-10-CM

## 2018-07-06 DIAGNOSIS — Z00.00 PREVENTATIVE HEALTH CARE: ICD-10-CM

## 2018-07-06 DIAGNOSIS — E87.6 HYPOKALEMIA: ICD-10-CM

## 2018-07-06 PROCEDURE — 99214 OFFICE O/P EST MOD 30 MIN: CPT | Performed by: NURSE PRACTITIONER

## 2018-07-06 PROCEDURE — G8427 DOCREV CUR MEDS BY ELIG CLIN: HCPCS | Performed by: NURSE PRACTITIONER

## 2018-07-06 PROCEDURE — G8417 CALC BMI ABV UP PARAM F/U: HCPCS | Performed by: NURSE PRACTITIONER

## 2018-07-06 PROCEDURE — 1036F TOBACCO NON-USER: CPT | Performed by: NURSE PRACTITIONER

## 2018-07-06 RX ORDER — SERTRALINE HYDROCHLORIDE 25 MG/1
TABLET, FILM COATED ORAL
COMMUNITY
End: 2018-07-06 | Stop reason: ALTCHOICE

## 2018-07-06 RX ORDER — IBUPROFEN 600 MG/1
TABLET ORAL
COMMUNITY
End: 2018-08-22

## 2018-07-06 RX ORDER — PREGABALIN 75 MG/1
CAPSULE ORAL
COMMUNITY
End: 2018-08-22

## 2018-07-06 RX ORDER — SERTRALINE HYDROCHLORIDE 100 MG/1
100 TABLET, FILM COATED ORAL DAILY
Qty: 30 TABLET | Refills: 1 | Status: SHIPPED | OUTPATIENT
Start: 2018-07-06 | End: 2018-09-07 | Stop reason: SDUPTHER

## 2018-07-06 ASSESSMENT — ENCOUNTER SYMPTOMS
EYE DISCHARGE: 0
ABDOMINAL PAIN: 0
SHORTNESS OF BREATH: 0
COUGH: 0
BLOOD IN STOOL: 0

## 2018-07-06 NOTE — PROGRESS NOTES
12/30/2015    HCT 41.4 12/30/2015     CMP:    Lab Results   Component Value Date    K 4.0 10/06/2017     No results found for: LABA1C    K =3.4 st L er     Assessment:       Diagnosis Orders   1. Major depressive disorder, recurrent, moderate (HCC)  sertraline (ZOLOFT) 100 MG tablet   2. Grief reaction     3. Fibromyalgia     4. Other type of osteoarthritis, unspecified site     5. Numbness and tingling of both lower extremities     6. Hypokalemia  Potassium   7. Preventative health care  HIV Screen       Plan:       Diagnosis Orders   1. Major depressive disorder, recurrent, moderate (HCC)  sertraline (ZOLOFT) 100 MG tablet   2. Grief reaction     3. Fibromyalgia     4. Other type of osteoarthritis, unspecified site     5. Numbness and tingling of both lower extremities     6. Hypokalemia  Potassium   7. Preventative health care  HIV Screen       incr zoloft , having grief reaction foll boyfriend death  On lyrica  Early onset, Using nsaid prn  Assess for Improvement with lyrica    Recent low K+, will recheck. Labs ordered         Shiela Trevino received counseling on the following healthy behaviors: medication adherence  Reviewed prior labs and health maintenance  Continue current medications, diet and exercise. Discussed use, benefit, and side effects of prescribed medications. Barriers to medication compliance addressed. Patient given educational materials - see patient instructions  Was a self-tracking handout given in paper form or via [employer]? Yes    Requested Prescriptions     Signed Prescriptions Disp Refills    sertraline (ZOLOFT) 100 MG tablet 30 tablet 1     Sig: Take 1 tablet by mouth daily       All patient questions answered. Patient voiced understanding. Quality Measures    Body mass index is 28.26 kg/m². Elevated. Weight control planned discussed Healthy diet and regular exercise. BP: 124/76 Blood pressure is normal. Treatment plan consists of No treatment change needed.     No results found

## 2018-07-18 DIAGNOSIS — R53.83 FATIGUE, UNSPECIFIED TYPE: ICD-10-CM

## 2018-07-18 DIAGNOSIS — E87.6 HYPOKALEMIA: ICD-10-CM

## 2018-07-18 LAB
BASOPHILS ABSOLUTE: 0.1 /ΜL
BASOPHILS RELATIVE PERCENT: 1 %
EOSINOPHILS ABSOLUTE: 0.2 /ΜL
EOSINOPHILS RELATIVE PERCENT: 2 %
HCT VFR BLD CALC: 43.6 % (ref 36–46)
HEMOGLOBIN: 14.4 G/DL (ref 12–16)
LYMPHOCYTES ABSOLUTE: 2.1 /ΜL
LYMPHOCYTES RELATIVE PERCENT: 27 %
MCH RBC QN AUTO: 28.1 PG
MCHC RBC AUTO-ENTMCNC: 33.1 G/DL
MCV RBC AUTO: 85 FL
MONOCYTES ABSOLUTE: 0.5 /ΜL
MONOCYTES RELATIVE PERCENT: 7 %
NEUTROPHILS ABSOLUTE: 4.9 /ΜL
NEUTROPHILS RELATIVE PERCENT: 63 %
PDW BLD-RTO: 14.8 %
PLATELET # BLD: 229 K/ΜL
PMV BLD AUTO: 8.4 FL
POTASSIUM (K+): 3.8
RBC # BLD: 5.13 10^6/ΜL
TSH SERPL DL<=0.05 MIU/L-ACNC: 0.55 UIU/ML
WBC # BLD: 7.8 10^3/ML

## 2018-07-19 DIAGNOSIS — Z00.00 PREVENTATIVE HEALTH CARE: ICD-10-CM

## 2018-07-19 LAB — HIV AG/AB: NON REACTIVE

## 2018-07-26 ENCOUNTER — TELEPHONE (OUTPATIENT)
Dept: FAMILY MEDICINE CLINIC | Age: 33
End: 2018-07-26

## 2018-07-26 NOTE — TELEPHONE ENCOUNTER
Pt called and stated she thinks she has a spider bite and it is seeping but no fever. Pt was offered the walk in. She does have an appt with Taniya Eduardo 7/27. Pt was advised if she develops a fever to come in to the walk in to be seen by our NP Terri Zuniga.

## 2018-07-27 ENCOUNTER — OFFICE VISIT (OUTPATIENT)
Dept: FAMILY MEDICINE CLINIC | Age: 33
End: 2018-07-27
Payer: COMMERCIAL

## 2018-07-27 VITALS
SYSTOLIC BLOOD PRESSURE: 118 MMHG | HEART RATE: 69 BPM | OXYGEN SATURATION: 99 % | BODY MASS INDEX: 28.45 KG/M2 | DIASTOLIC BLOOD PRESSURE: 72 MMHG | HEIGHT: 66 IN | RESPIRATION RATE: 20 BRPM | WEIGHT: 177 LBS

## 2018-07-27 DIAGNOSIS — M79.7 FIBROMYALGIA: ICD-10-CM

## 2018-07-27 DIAGNOSIS — L30.9 DERMATITIS: Primary | ICD-10-CM

## 2018-07-27 DIAGNOSIS — F41.9 ANXIETY: ICD-10-CM

## 2018-07-27 DIAGNOSIS — F33.1 MAJOR DEPRESSIVE DISORDER, RECURRENT, MODERATE (HCC): ICD-10-CM

## 2018-07-27 DIAGNOSIS — E87.6 HYPOKALEMIA: ICD-10-CM

## 2018-07-27 PROCEDURE — G8417 CALC BMI ABV UP PARAM F/U: HCPCS | Performed by: NURSE PRACTITIONER

## 2018-07-27 PROCEDURE — 1036F TOBACCO NON-USER: CPT | Performed by: NURSE PRACTITIONER

## 2018-07-27 PROCEDURE — 99214 OFFICE O/P EST MOD 30 MIN: CPT | Performed by: NURSE PRACTITIONER

## 2018-07-27 PROCEDURE — G8427 DOCREV CUR MEDS BY ELIG CLIN: HCPCS | Performed by: NURSE PRACTITIONER

## 2018-07-27 RX ORDER — TRIAMCINOLONE ACETONIDE 1 MG/G
CREAM TOPICAL
Qty: 45 G | Refills: 0 | Status: SHIPPED | OUTPATIENT
Start: 2018-07-27 | End: 2018-12-04 | Stop reason: ALTCHOICE

## 2018-07-27 ASSESSMENT — ENCOUNTER SYMPTOMS
COUGH: 0
EYE DISCHARGE: 0
ABDOMINAL PAIN: 0
SHORTNESS OF BREATH: 0
BLOOD IN STOOL: 0

## 2018-07-27 NOTE — PROGRESS NOTES
[place] [id number]  [address] [place] [id number]  Dept: [phone]  Dept Fax: [phone]    Adri Linton is a 28 y.o. female who presents today for her medical conditions/complaints as noted below. Adri Linton is c/o of Depression (3 week follow up and has spider bite on left leg from 3 days ago) and Medication Problem (needs an alternative to lyrica because insurance doesn't cover. )        HPI:     Patient presents with:  Depression: 3 week follow up, agreeable to trying dose level for 6 w . Boyfriend passed and then cole was killed in stabbing    and has spider bite on left leg from 3 days ago. Medication Problem: needs an alternative to lyrica because insurance doesn't cover. lyrica rx from rheum  Rash on chest wall, x 9 y appeared during preg , flares In cold and heat and itches then   Otc; lotion          Past Medical History:   Diagnosis Date    Anxiety     HPV (human papilloma virus) anogenital infection     Rheumatic fever     Spinal stenosis     Wears glasses       Past Surgical History:   Procedure Laterality Date    CHOLECYSTECTOMY      DENTAL SURGERY      removed some teeth at age 1   [facility] TONSILLECTOMY AND ADENOIDECTOMY Bilateral 1/7/2016       Family History   Problem Relation Age of Onset    Diabetes Father     High Blood Pressure Father     High Cholesterol Father        Social History   Substance Use Topics    Smoking status: Former Smoker     Packs/day: 0.50     Years: 11.00     Types: Cigarettes    Smokeless tobacco: Never Used      Comment: off and on 11 years    Alcohol use No      Current Outpatient Prescriptions   Medication Sig Dispense Refill    triamcinolone (KENALOG) 0.1 % cream Apply topically 2 times daily. 45 g 0    ibuprofen (ADVIL;MOTRIN) 600 MG tablet ibuprofen 600 mg tablet   Take 1 tablet 3 times a day by oral route as needed.       sertraline (ZOLOFT) 100 MG tablet Take 1 tablet by mouth daily 30 tablet 1    pregabalin (LYRICA) 75 MG capsule Lyrica 75 mg capsule   Take 1 capsule twice a day by oral route for 30 days. No current facility-administered medications for this visit. Allergies   Allergen Reactions    Latex Other (See Comments)     Redness and swelling    Tape Aldo Estrin Tape] Other (See Comments)     Plastic tape causes skin irritation         Subjective:      Review of Systems   Constitutional: Negative for activity change, chills, fatigue and fever. Eyes: Negative for discharge and visual disturbance. Respiratory: Negative for cough and shortness of breath. Cardiovascular: Negative for chest pain and leg swelling. Gastrointestinal: Negative for abdominal pain and blood in stool. Endocrine: Negative for cold intolerance and heat intolerance. Genitourinary: Negative for dysuria and flank pain. Musculoskeletal: Negative for joint swelling and myalgias. Skin: Positive for rash. Negative for pallor. L leg bite   Neurological: Negative for dizziness and headaches. Psychiatric/Behavioral: Positive for dysphoric mood. Negative for hallucinations and suicidal ideas. Objective:     Physical Exam   Constitutional: She is oriented to person, place, and time. She appears well-developed and well-nourished. /72 (Site: Right Arm, Position: Sitting, Cuff Size: Medium Adult)   Pulse 69   Resp 20   Ht 5' 6\" (1.676 m)   Wt 177 lb (80.3 kg)   SpO2 99%   BMI 28.57 kg/m²      HENT:   Head: Normocephalic and atraumatic. Eyes: Conjunctivae and EOM are normal. No scleral icterus. Neck: Neck supple. Cardiovascular: Normal rate, regular rhythm, normal heart sounds and intact distal pulses. Pulmonary/Chest: Effort normal and breath sounds normal. She has no wheezes. She has no rales. Abdominal: Soft. Bowel sounds are normal.   Musculoskeletal: Normal range of motion. She exhibits no edema.         Arms:  Neurological: She is alert and oriented to person, - see patient instructions  Was a self-tracking handout given in paper form or via [employer]? Yes    Requested Prescriptions     Signed Prescriptions Disp Refills    triamcinolone (KENALOG) 0.1 % cream 45 g 0     Sig: Apply topically 2 times daily. All patient questions answered. Patient voiced understanding. Quality Measures    Body mass index is 28.57 kg/m². Elevated. Weight control planned discussed Healthy diet and regular exercise. BP: 118/72 Blood pressure is normal. Treatment plan consists of No treatment change needed. No results found for: LDLCALC, LDLCHOLESTEROL, LDLDIRECT (goal LDL reduction with dx if diabetes is 50% LDL reduction)      PHQ Scores 4/24/2018 3/20/2018 12/19/2017 9/22/2017   PHQ2 Score 0 2 0 0   PHQ9 Score 0 13 0 0     Interpretation of Total Score Depression Severity: 1-4 = Minimal depression, 5-9 = Mild depression, 10-14 = Moderate depression, 15-19 = Moderately severe depression, 20-27 = Severe depression    No Follow-up on file. Orders Placed This Encounter   Procedures   Guzman Redd MD, Dermatology [place]     Referral Priority:   Routine     Referral Type:   Eval and Treat     Referral Reason:   Specialty Services Required     Referred to Provider:   Manuela Batres MD     Requested Specialty:   Dermatology     Number of Visits Requested:   1     Orders Placed This Encounter   Medications    triamcinolone (KENALOG) 0.1 % cream     Sig: Apply topically 2 times daily. Dispense:  45 g     Refill:  0       Patient given verbal and/or written educational instructions. Follow up as directed. I have reviewed and agree with the nursing documentation.     Electronically signed by SATHISH Wilhelm CNP on 7/27/2018 at 8:55 AM

## 2018-07-27 NOTE — PROGRESS NOTES
Visit Information    Have you changed or started any medications since your last visit including any over-the-counter medicines, vitamins, or herbal medicines? no   Are you having any side effects from any of your medications? -  no  Have you stopped taking any of your medications? Is so, why? -  no    Have you seen any other physician or provider since your last visit? Yes [facility] rhematology  Have you had any other diagnostic tests since your last visit? Yes - Records Obtained  Have you been seen in the emergency room and/or had an admission to a hospital since we last saw you? No  Have you had your routine dental cleaning in the past 6 months? no    Have you activated your [employer] account? If not, what are your barriers?  Yes     Patient Care Team:  SATHISH Taveras CNP as PCP - General (Certified Nurse Practitioner)  SATHISH Taveras CNP as PCP - S Attributed Provider  Damien Durand MD as Consulting Physician (Gastroenterology)    Medical History Review  Past Medical, Family, and Social History reviewed and does not contribute to the patient presenting condition    Health Maintenance   Topic Date Due    Cervical cancer screen  05/26/2018    Flu vaccine (1) 09/01/2018    DTaP/Tdap/Td vaccine (2 - Td) 09/01/2023    HIV screen  Completed

## 2018-08-22 ENCOUNTER — OFFICE VISIT (OUTPATIENT)
Dept: FAMILY MEDICINE CLINIC | Age: 33
End: 2018-08-22
Payer: COMMERCIAL

## 2018-08-22 VITALS
TEMPERATURE: 98.9 F | BODY MASS INDEX: 28.45 KG/M2 | OXYGEN SATURATION: 98 % | HEART RATE: 85 BPM | WEIGHT: 177 LBS | DIASTOLIC BLOOD PRESSURE: 70 MMHG | SYSTOLIC BLOOD PRESSURE: 121 MMHG | HEIGHT: 66 IN

## 2018-08-22 DIAGNOSIS — G89.29 CHRONIC BILATERAL LOW BACK PAIN WITH BILATERAL SCIATICA: Primary | ICD-10-CM

## 2018-08-22 DIAGNOSIS — M54.42 CHRONIC BILATERAL LOW BACK PAIN WITH BILATERAL SCIATICA: Primary | ICD-10-CM

## 2018-08-22 DIAGNOSIS — M54.41 CHRONIC BILATERAL LOW BACK PAIN WITH BILATERAL SCIATICA: Primary | ICD-10-CM

## 2018-08-22 DIAGNOSIS — M48.00 SPINAL STENOSIS, UNSPECIFIED SPINAL REGION: ICD-10-CM

## 2018-08-22 PROCEDURE — G8417 CALC BMI ABV UP PARAM F/U: HCPCS | Performed by: PHYSICIAN ASSISTANT

## 2018-08-22 PROCEDURE — 99213 OFFICE O/P EST LOW 20 MIN: CPT | Performed by: PHYSICIAN ASSISTANT

## 2018-08-22 PROCEDURE — G8427 DOCREV CUR MEDS BY ELIG CLIN: HCPCS | Performed by: PHYSICIAN ASSISTANT

## 2018-08-22 PROCEDURE — 1036F TOBACCO NON-USER: CPT | Performed by: PHYSICIAN ASSISTANT

## 2018-08-22 RX ORDER — RANITIDINE 150 MG/1
150 TABLET ORAL DAILY
Qty: 30 TABLET | Refills: 0 | Status: SHIPPED | OUTPATIENT
Start: 2018-08-22 | End: 2018-09-07 | Stop reason: ALTCHOICE

## 2018-08-22 RX ORDER — PREDNISONE 20 MG/1
TABLET ORAL
Qty: 18 TABLET | Refills: 0 | Status: SHIPPED | OUTPATIENT
Start: 2018-08-22 | End: 2018-09-01

## 2018-08-22 RX ORDER — TIZANIDINE 4 MG/1
2 TABLET ORAL 3 TIMES DAILY
Qty: 30 TABLET | Refills: 0 | Status: SHIPPED | OUTPATIENT
Start: 2018-08-22 | End: 2018-09-07 | Stop reason: SDUPTHER

## 2018-08-22 RX ORDER — TRIAMCINOLONE ACETONIDE 40 MG/ML
40 INJECTION, SUSPENSION INTRA-ARTICULAR; INTRAMUSCULAR ONCE
Status: COMPLETED | OUTPATIENT
Start: 2018-08-22 | End: 2018-08-22

## 2018-08-22 RX ORDER — KETOROLAC TROMETHAMINE 30 MG/ML
60 INJECTION, SOLUTION INTRAMUSCULAR; INTRAVENOUS ONCE
Status: COMPLETED | OUTPATIENT
Start: 2018-08-22 | End: 2018-08-22

## 2018-08-22 RX ADMIN — TRIAMCINOLONE ACETONIDE 40 MG: 40 INJECTION, SUSPENSION INTRA-ARTICULAR; INTRAMUSCULAR at 14:56

## 2018-08-22 RX ADMIN — KETOROLAC TROMETHAMINE 60 MG: 30 INJECTION, SOLUTION INTRAMUSCULAR; INTRAVENOUS at 14:56

## 2018-08-22 ASSESSMENT — ENCOUNTER SYMPTOMS
ABDOMINAL PAIN: 0
SORE THROAT: 0
SINUS PRESSURE: 0
RHINORRHEA: 0
DIARRHEA: 0
CHEST TIGHTNESS: 0
EYE ITCHING: 0
BOWEL INCONTINENCE: 0
VOMITING: 0
BACK PAIN: 1
COUGH: 0
NAUSEA: 0
EYE DISCHARGE: 0
SHORTNESS OF BREATH: 0

## 2018-08-22 NOTE — PROGRESS NOTES
[place] [id number]  [address] [place], [place] [id number]  Dept: [phone]  Dept Fax: [phone]    Ambrosio Bob is a 28 y.o. female who presents today for her medical conditions/complaints as noted below. Ambrsoio Bob is c/o of   Chief Complaint   Patient presents with    Back Pain     mond ay morning          HPI:     Back Pain   This is a chronic problem. The pain is present in the lumbar spine. The pain is at a severity of 5/10. The pain is moderate. Associated symptoms include leg pain and numbness. Pertinent negatives include no abdominal pain, bladder incontinence, bowel incontinence, chest pain, dysuria, fever, headaches, pelvic pain, perianal numbness, tingling or weakness. No results found for: LABA1C          ( goal A1C is < 7)   No results found for: LABMICR  No results found for: LDLCHOLESTEROL, LDLCALC    (goal LDL is <100)   No results found for: AST, ALT, BUN  BP Readings from Last 3 Encounters:   08/22/18 121/70   07/27/18 118/72   07/06/18 124/76          (goal 120/80)    Past Medical History:   Diagnosis Date    Anxiety     HPV (human papilloma virus) anogenital infection     Rheumatic fever     Spinal stenosis     Wears glasses       Past Surgical History:   Procedure Laterality Date    CHOLECYSTECTOMY      DENTAL SURGERY      removed some teeth at age 1   [facility] TONSILLECTOMY AND ADENOIDECTOMY Bilateral 1/7/2016       Family History   Problem Relation Age of Onset    Diabetes Father     High Blood Pressure Father     High Cholesterol Father        Social History   Substance Use Topics    Smoking status: Former Smoker     Packs/day: 0.50     Years: 11.00     Types: Cigarettes    Smokeless tobacco: Never Used      Comment: off and on 11 years    Alcohol use No      Current Outpatient Prescriptions   Medication Sig Dispense Refill    triamcinolone (KENALOG) 0.1 % cream Apply topically 2 times daily.  45 g 0    sertraline (ZOLOFT) 100 MG tablet Take 1 tablet by mouth daily 30 tablet 1     No current facility-administered medications for this visit. Allergies   Allergen Reactions    Latex Other (See Comments)     Redness and swelling    Tape Joce Loffler Tape] Other (See Comments)     Plastic tape causes skin irritation       Health Maintenance   Topic Date Due    Cervical cancer screen  05/26/2018    Flu vaccine (1) 09/01/2018    DTaP/Tdap/Td vaccine (2 - Td) 09/01/2023    HIV screen  Completed       Subjective:      Review of Systems   Constitutional: Negative for chills, diaphoresis, fatigue and fever. HENT: Negative for congestion, ear discharge, ear pain, postnasal drip, rhinorrhea, sinus pressure and sore throat. Eyes: Negative for discharge and itching. Respiratory: Negative for cough, chest tightness and shortness of breath. Cardiovascular: Negative for chest pain, palpitations and leg swelling. Gastrointestinal: Negative for abdominal pain, bowel incontinence, diarrhea, nausea and vomiting. Genitourinary: Negative for bladder incontinence, dysuria, frequency and pelvic pain. Musculoskeletal: Positive for back pain. Negative for neck pain and neck stiffness. Skin: Negative for rash. Neurological: Positive for numbness. Negative for dizziness, tingling, weakness, light-headedness and headaches. All other systems reviewed and are negative. Objective:     Physical Exam   Constitutional: She is oriented to person, place, and time. She appears well-developed and well-nourished. No distress. /70   Pulse 85   Temp 98.9 °F (37.2 °C) (Oral)   Ht 5' 6\" (1.676 m)   Wt 177 lb (80.3 kg)   SpO2 98%   BMI 28.57 kg/m²      HENT:   Head: Normocephalic and atraumatic. Right Ear: External ear normal.   Left Ear: External ear normal.   Nose: Nose normal.   Mouth/Throat: Oropharynx is clear and moist.   Eyes: Pupils are equal, round, and reactive to light.  Conjunctivae and EOM are normal. Right

## 2018-09-07 ENCOUNTER — TELEPHONE (OUTPATIENT)
Dept: FAMILY MEDICINE CLINIC | Age: 33
End: 2018-09-07

## 2018-09-07 ENCOUNTER — OFFICE VISIT (OUTPATIENT)
Dept: FAMILY MEDICINE CLINIC | Age: 33
End: 2018-09-07
Payer: COMMERCIAL

## 2018-09-07 VITALS
WEIGHT: 177 LBS | HEART RATE: 71 BPM | OXYGEN SATURATION: 99 % | TEMPERATURE: 97.7 F | HEIGHT: 66 IN | DIASTOLIC BLOOD PRESSURE: 70 MMHG | BODY MASS INDEX: 28.45 KG/M2 | SYSTOLIC BLOOD PRESSURE: 122 MMHG

## 2018-09-07 DIAGNOSIS — M54.50 LUMBAR PAIN: ICD-10-CM

## 2018-09-07 DIAGNOSIS — M54.16 LUMBAR RADICULAR PAIN: ICD-10-CM

## 2018-09-07 DIAGNOSIS — F33.1 MAJOR DEPRESSIVE DISORDER, RECURRENT, MODERATE (HCC): Primary | ICD-10-CM

## 2018-09-07 DIAGNOSIS — M62.830 BACK SPASM: ICD-10-CM

## 2018-09-07 DIAGNOSIS — R09.89 CHEST CONGESTION: ICD-10-CM

## 2018-09-07 PROBLEM — F32.A MODERATE DEPRESSIVE DISORDER: Status: RESOLVED | Noted: 2018-04-24 | Resolved: 2018-09-07

## 2018-09-07 PROCEDURE — G8427 DOCREV CUR MEDS BY ELIG CLIN: HCPCS | Performed by: NURSE PRACTITIONER

## 2018-09-07 PROCEDURE — 99214 OFFICE O/P EST MOD 30 MIN: CPT | Performed by: NURSE PRACTITIONER

## 2018-09-07 PROCEDURE — G8417 CALC BMI ABV UP PARAM F/U: HCPCS | Performed by: NURSE PRACTITIONER

## 2018-09-07 PROCEDURE — 1036F TOBACCO NON-USER: CPT | Performed by: NURSE PRACTITIONER

## 2018-09-07 RX ORDER — SERTRALINE HYDROCHLORIDE 100 MG/1
100 TABLET, FILM COATED ORAL DAILY
Qty: 30 TABLET | Refills: 2 | Status: SHIPPED | OUTPATIENT
Start: 2018-09-07 | End: 2019-01-25 | Stop reason: SDUPTHER

## 2018-09-07 RX ORDER — TIZANIDINE 4 MG/1
2 TABLET ORAL 3 TIMES DAILY
Qty: 30 TABLET | Refills: 0 | Status: SHIPPED | OUTPATIENT
Start: 2018-09-07 | End: 2018-10-10 | Stop reason: ALTCHOICE

## 2018-09-07 RX ORDER — BENZONATATE 100 MG/1
100 CAPSULE ORAL 3 TIMES DAILY PRN
Qty: 30 CAPSULE | Refills: 0 | Status: SHIPPED | OUTPATIENT
Start: 2018-09-07 | End: 2018-09-17

## 2018-09-07 ASSESSMENT — ENCOUNTER SYMPTOMS
SINUS PRESSURE: 1
RHINORRHEA: 1
COUGH: 1
BACK PAIN: 1
SORE THROAT: 1

## 2018-09-07 NOTE — TELEPHONE ENCOUNTER
[facility] pre cert called and stated pts MRI Lumbar was denied by pts insurance. They will contact pt.

## 2018-09-07 NOTE — PROGRESS NOTES
Comments)     Redness and swelling    Tape Sherleen Breaker Tape] Other (See Comments)     Plastic tape causes skin irritation         Subjective:      Review of Systems   Constitutional: Positive for fatigue. HENT: Positive for congestion, postnasal drip, rhinorrhea (clear ), sinus pressure and sore throat. Respiratory: Positive for cough (pahlegm is clear). Musculoskeletal: Positive for back pain. Neurological: Positive for headaches. Objective:     Physical Exam   Constitutional: She is oriented to person, place, and time. She appears well-developed and well-nourished. /70   Pulse 71   Temp 97.7 °F (36.5 °C) (Oral)   Ht 5' 6\" (1.676 m)   Wt 177 lb (80.3 kg)   SpO2 99%   BMI 28.57 kg/m²      HENT:   Head: Normocephalic and atraumatic. Eyes: Conjunctivae and EOM are normal. No scleral icterus. Neck: Neck supple. Cardiovascular: Normal rate, regular rhythm, normal heart sounds and intact distal pulses. Pulmonary/Chest: Effort normal and breath sounds normal. She has no wheezes. She has no rales. Abdominal: Soft. Bowel sounds are normal.   Musculoskeletal: She exhibits no edema. +paraspinal tenderness with palpation /lumbar spine. Neurological: She is alert and oriented to person, place, and time. Skin: Skin is warm and dry. Psychiatric: She has a normal mood and affect. Nursing note and vitals reviewed. /70   Pulse 71   Temp 97.7 °F (36.5 °C) (Oral)   Ht 5' 6\" (1.676 m)   Wt 177 lb (80.3 kg)   SpO2 99%   BMI 28.57 kg/m²     CBC:   Lab Results   Component Value Date    WBC 7.8 07/18/2018    RBC 5.13 07/18/2018    HGB 14.4 07/18/2018    HCT 43.6 07/18/2018    MCV 85 07/18/2018    MCH 28.1 07/18/2018    MCHC 33.1 07/18/2018    RDW 14.8 07/18/2018     07/18/2018    MPV 8.4 07/18/2018     CMP:    Lab Results   Component Value Date    K 3.8 07/18/2018    K 4.0 10/06/2017     No results found for: LABA1C        Assessment:       Diagnosis Orders   1.

## 2018-10-10 ENCOUNTER — OFFICE VISIT (OUTPATIENT)
Dept: DERMATOLOGY | Age: 33
End: 2018-10-10
Payer: COMMERCIAL

## 2018-10-10 VITALS
OXYGEN SATURATION: 96 % | BODY MASS INDEX: 28.64 KG/M2 | DIASTOLIC BLOOD PRESSURE: 77 MMHG | WEIGHT: 178.2 LBS | HEIGHT: 66 IN | HEART RATE: 78 BPM | SYSTOLIC BLOOD PRESSURE: 114 MMHG

## 2018-10-10 DIAGNOSIS — I78.1 TELANGIECTASIA OF SKIN: Primary | ICD-10-CM

## 2018-10-10 PROCEDURE — G8417 CALC BMI ABV UP PARAM F/U: HCPCS | Performed by: DERMATOLOGY

## 2018-10-10 PROCEDURE — G8427 DOCREV CUR MEDS BY ELIG CLIN: HCPCS | Performed by: DERMATOLOGY

## 2018-10-10 PROCEDURE — G8484 FLU IMMUNIZE NO ADMIN: HCPCS | Performed by: DERMATOLOGY

## 2018-10-10 PROCEDURE — 99202 OFFICE O/P NEW SF 15 MIN: CPT | Performed by: DERMATOLOGY

## 2018-10-10 PROCEDURE — 4004F PT TOBACCO SCREEN RCVD TLK: CPT | Performed by: DERMATOLOGY

## 2018-10-10 RX ORDER — AMITRIPTYLINE HYDROCHLORIDE 10 MG/1
10 TABLET, FILM COATED ORAL NIGHTLY
COMMUNITY
End: 2019-01-25 | Stop reason: ALTCHOICE

## 2018-10-10 RX ORDER — GABAPENTIN 300 MG/1
300 CAPSULE ORAL
COMMUNITY
End: 2019-09-04

## 2018-10-10 NOTE — PROGRESS NOTES
Dermatology Patient Note  [address] [facility] DERMATOLOGY  [address] [facility]  [address] [place] [address] [place] [address]  Dept: [phone]  Dept Fax: [phone]      VISITDATE: 10/10/2018   REFERRING PROVIDER: CHARLEE Fregoso Daren Khalil is a 28 y.o. female  who presents today in the office for:    New Patient (Pt has a rash on the chest for years. She saw a Dermatologist in [place]. She used Triamcinolone and it made the rash more read and itchy.)      HISTORY OF PRESENT ILLNESS:  Patient presents for red larissa  Location: central chest  Duration: 9 years, came on in her first pregnancy then faded, then recurred in second pregnancy and stayed  Symptoms: itchy ONLY when she applies triamcinolone  Exacerbating factors: pregnancy  Prior treatments: none    Has previously had moles removed, told they were benign      CURRENT MEDICATIONS:   Current Outpatient Prescriptions   Medication Sig Dispense Refill    gabapentin (NEURONTIN) 300 MG capsule Take 300 mg by mouth. Esther Butts amitriptyline (ELAVIL) 10 MG tablet Take 10 mg by mouth nightly      sertraline (ZOLOFT) 100 MG tablet Take 1 tablet by mouth daily 30 tablet 2    triamcinolone (KENALOG) 0.1 % cream Apply topically 2 times daily. 45 g 0     No current facility-administered medications for this visit. ALLERGIES:   Allergies   Allergen Reactions    Latex Other (See Comments)     Redness and swelling    Tape Klarissa Carson Tape] Other (See Comments)     Plastic tape causes skin irritation       SOCIAL HISTORY:  [unfilled]    REVIEW OF SYSTEMS:  Review of Systems   Constitutional: Negative.       Skin: Denies any new changing, growing orbleeding lesions or rashes except as described in the HPI     PHYSICAL EXAM:   /77 (Site: Left Upper Arm, Position: Sitting, Cuff Size: Medium Adult)   Pulse 78   Ht 5' 6\" (1.676 m)   Wt 178 lb 3.2 oz (80.8 kg)   SpO2 96%   BMI 28.76 kg/m²     General Exam:  General Appearance: No acute distress, Well

## 2018-12-04 ENCOUNTER — OFFICE VISIT (OUTPATIENT)
Dept: FAMILY MEDICINE CLINIC | Age: 33
End: 2018-12-04
Payer: COMMERCIAL

## 2018-12-04 VITALS
DIASTOLIC BLOOD PRESSURE: 84 MMHG | SYSTOLIC BLOOD PRESSURE: 132 MMHG | HEIGHT: 66 IN | TEMPERATURE: 98.1 F | HEART RATE: 94 BPM | OXYGEN SATURATION: 99 % | WEIGHT: 180.4 LBS | BODY MASS INDEX: 28.99 KG/M2

## 2018-12-04 DIAGNOSIS — M54.41 CHRONIC BILATERAL LOW BACK PAIN WITH BILATERAL SCIATICA: ICD-10-CM

## 2018-12-04 DIAGNOSIS — F33.1 MAJOR DEPRESSIVE DISORDER, RECURRENT, MODERATE (HCC): Primary | ICD-10-CM

## 2018-12-04 DIAGNOSIS — G89.29 CHRONIC BILATERAL LOW BACK PAIN WITH BILATERAL SCIATICA: ICD-10-CM

## 2018-12-04 DIAGNOSIS — R20.2 NUMBNESS AND TINGLING OF BOTH LOWER EXTREMITIES: ICD-10-CM

## 2018-12-04 DIAGNOSIS — R20.0 NUMBNESS AND TINGLING OF BOTH LOWER EXTREMITIES: ICD-10-CM

## 2018-12-04 DIAGNOSIS — M19.90 OTHER TYPE OF OSTEOARTHRITIS, UNSPECIFIED SITE: ICD-10-CM

## 2018-12-04 DIAGNOSIS — M54.42 CHRONIC BILATERAL LOW BACK PAIN WITH BILATERAL SCIATICA: ICD-10-CM

## 2018-12-04 DIAGNOSIS — F41.9 ANXIETY: ICD-10-CM

## 2018-12-04 DIAGNOSIS — Z23 NEED FOR INFLUENZA VACCINATION: ICD-10-CM

## 2018-12-04 DIAGNOSIS — M79.7 FIBROMYALGIA: ICD-10-CM

## 2018-12-04 DIAGNOSIS — Z72.0 TOBACCO ABUSE: ICD-10-CM

## 2018-12-04 PROCEDURE — 90688 IIV4 VACCINE SPLT 0.5 ML IM: CPT | Performed by: NURSE PRACTITIONER

## 2018-12-04 PROCEDURE — 90471 IMMUNIZATION ADMIN: CPT | Performed by: NURSE PRACTITIONER

## 2018-12-04 PROCEDURE — G8417 CALC BMI ABV UP PARAM F/U: HCPCS | Performed by: NURSE PRACTITIONER

## 2018-12-04 PROCEDURE — 90472 IMMUNIZATION ADMIN EACH ADD: CPT | Performed by: NURSE PRACTITIONER

## 2018-12-04 PROCEDURE — G8482 FLU IMMUNIZE ORDER/ADMIN: HCPCS | Performed by: NURSE PRACTITIONER

## 2018-12-04 PROCEDURE — 4004F PT TOBACCO SCREEN RCVD TLK: CPT | Performed by: NURSE PRACTITIONER

## 2018-12-04 PROCEDURE — G8427 DOCREV CUR MEDS BY ELIG CLIN: HCPCS | Performed by: NURSE PRACTITIONER

## 2018-12-04 PROCEDURE — 90732 PPSV23 VACC 2 YRS+ SUBQ/IM: CPT | Performed by: NURSE PRACTITIONER

## 2018-12-04 PROCEDURE — 99214 OFFICE O/P EST MOD 30 MIN: CPT | Performed by: NURSE PRACTITIONER

## 2018-12-04 RX ORDER — SERTRALINE HYDROCHLORIDE 25 MG/1
25 TABLET, FILM COATED ORAL DAILY
Qty: 90 TABLET | Refills: 0 | Status: SHIPPED | OUTPATIENT
Start: 2018-12-04 | End: 2019-01-25 | Stop reason: SDUPTHER

## 2018-12-04 ASSESSMENT — ENCOUNTER SYMPTOMS
SHORTNESS OF BREATH: 0
BLOOD IN STOOL: 0
ABDOMINAL PAIN: 0
EYE DISCHARGE: 0
COUGH: 0

## 2018-12-04 NOTE — PROGRESS NOTES
self-tracking handout given in paper form or via [employer]? Yes    Requested Prescriptions     Signed Prescriptions Disp Refills    sertraline (ZOLOFT) 25 MG tablet 90 tablet 0     Sig: Take 1 tablet by mouth daily       All patient questions answered. Patient voiced understanding. Quality Measures    Body mass index is 29.12 kg/m². Elevated. Weight control planned discussed Healthy diet and regular exercise. BP: 132/84 Blood pressure is normal. Treatment plan consists of No treatment change needed. No results found for: LDLCALC, LDLCHOLESTEROL, LDLDIRECT (goal LDL reduction with dx if diabetes is 50% LDL reduction)      PHQ Scores 4/24/2018 3/20/2018 12/19/2017 9/22/2017   PHQ2 Score 0 2 0 0   PHQ9 Score 0 13 0 0     Interpretation of Total Score Depression Severity: 1-4 = Minimal depression, 5-9 = Mild depression, 10-14 = Moderate depression, 15-19 = Moderately severe depression, 20-27 = Severe depression    No Follow-up on file. Orders Placed This Encounter   Procedures    INFLUENZA, QUADV, 3 YRS AND OLDER, IM, MDV, 0.5ML (FLUZONE QUADV)    Pneumococcal polysaccharide vaccine 23-valent greater than or equal to 1yo subcutaneous/IM     Orders Placed This Encounter   Medications    sertraline (ZOLOFT) 25 MG tablet     Sig: Take 1 tablet by mouth daily     Dispense:  90 tablet     Refill:  0       Patientgiven verbal and/or written educational instructions. Follow up as directed. I have reviewed and agree with the nursing documentation.     Electronically signedby Unknown SATHISH Acevedo CNP on 12/4/2018 at 8:53 AM

## 2019-01-01 ENCOUNTER — HOSPITAL ENCOUNTER (EMERGENCY)
Age: 34
Discharge: HOME | End: 2019-01-01
Attending: EMERGENCY MEDICINE
Payer: COMMERCIAL

## 2019-01-01 VITALS
SYSTOLIC BLOOD PRESSURE: 128 MMHG | BODY MASS INDEX: 28.12 KG/M2 | WEIGHT: 175 LBS | HEIGHT: 66 IN | DIASTOLIC BLOOD PRESSURE: 68 MMHG | HEART RATE: 87 BPM | TEMPERATURE: 98.1 F | RESPIRATION RATE: 12 BRPM | OXYGEN SATURATION: 100 %

## 2019-01-01 DIAGNOSIS — I89.1 LYMPHANGITIS: ICD-10-CM

## 2019-01-01 DIAGNOSIS — L03.114 CELLULITIS OF LEFT UPPER EXTREMITY: Primary | ICD-10-CM

## 2019-01-01 PROCEDURE — 99283 EMERGENCY DEPT VISIT LOW MDM: CPT

## 2019-01-01 RX ORDER — DIPHENHYDRAMINE HCL 25 MG
50 CAPSULE ORAL EVERY 6 HOURS PRN
Qty: 30 CAPSULE | Refills: 0 | Status: SHIPPED | OUTPATIENT
Start: 2019-01-01 | End: 2019-09-04

## 2019-01-01 RX ORDER — DOXYCYCLINE 100 MG/1
100 TABLET ORAL 2 TIMES DAILY
Qty: 14 TABLET | Refills: 0 | Status: SHIPPED | OUTPATIENT
Start: 2019-01-01 | End: 2019-01-08

## 2019-01-01 ASSESSMENT — PAIN DESCRIPTION - LOCATION: LOCATION: HAND

## 2019-01-01 ASSESSMENT — PAIN DESCRIPTION - ORIENTATION: ORIENTATION: LEFT

## 2019-01-01 ASSESSMENT — PAIN DESCRIPTION - PAIN TYPE: TYPE: ACUTE PAIN

## 2019-01-01 ASSESSMENT — PAIN SCALES - GENERAL: PAINLEVEL_OUTOF10: 6

## 2019-01-02 ENCOUNTER — TELEPHONE (OUTPATIENT)
Dept: FAMILY MEDICINE CLINIC | Age: 34
End: 2019-01-02

## 2019-01-02 ENCOUNTER — OFFICE VISIT (OUTPATIENT)
Dept: FAMILY MEDICINE CLINIC | Age: 34
End: 2019-01-02
Payer: COMMERCIAL

## 2019-01-02 VITALS — TEMPERATURE: 97.2 F | HEART RATE: 74 BPM | OXYGEN SATURATION: 98 %

## 2019-01-02 DIAGNOSIS — L03.114 CELLULITIS OF LEFT UPPER EXTREMITY: Primary | ICD-10-CM

## 2019-01-02 DIAGNOSIS — I89.1 LYMPHANGITIS: ICD-10-CM

## 2019-01-02 PROCEDURE — G8417 CALC BMI ABV UP PARAM F/U: HCPCS | Performed by: NURSE PRACTITIONER

## 2019-01-02 PROCEDURE — G8482 FLU IMMUNIZE ORDER/ADMIN: HCPCS | Performed by: NURSE PRACTITIONER

## 2019-01-02 PROCEDURE — 4004F PT TOBACCO SCREEN RCVD TLK: CPT | Performed by: NURSE PRACTITIONER

## 2019-01-02 PROCEDURE — 99213 OFFICE O/P EST LOW 20 MIN: CPT | Performed by: NURSE PRACTITIONER

## 2019-01-02 PROCEDURE — G8427 DOCREV CUR MEDS BY ELIG CLIN: HCPCS | Performed by: NURSE PRACTITIONER

## 2019-01-02 RX ORDER — CEFTRIAXONE 1 G/1
1 INJECTION, POWDER, FOR SOLUTION INTRAMUSCULAR; INTRAVENOUS ONCE
Status: COMPLETED | OUTPATIENT
Start: 2019-01-02 | End: 2019-01-02

## 2019-01-02 RX ADMIN — CEFTRIAXONE 1 G: 1 INJECTION, POWDER, FOR SOLUTION INTRAMUSCULAR; INTRAVENOUS at 10:05

## 2019-01-02 ASSESSMENT — ENCOUNTER SYMPTOMS
VOMITING: 0
SHORTNESS OF BREATH: 0

## 2019-01-04 ENCOUNTER — OFFICE VISIT (OUTPATIENT)
Dept: FAMILY MEDICINE CLINIC | Age: 34
End: 2019-01-04
Payer: COMMERCIAL

## 2019-01-04 VITALS
WEIGHT: 178.1 LBS | DIASTOLIC BLOOD PRESSURE: 66 MMHG | HEART RATE: 66 BPM | BODY MASS INDEX: 28.62 KG/M2 | SYSTOLIC BLOOD PRESSURE: 112 MMHG | OXYGEN SATURATION: 100 % | TEMPERATURE: 98.5 F | HEIGHT: 66 IN
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
## 2019-01-04 DIAGNOSIS — L03.114 CELLULITIS OF LEFT UPPER EXTREMITY: Primary | ICD-10-CM

## 2019-01-04 PROCEDURE — G8417 CALC BMI ABV UP PARAM F/U: HCPCS | Performed by: NURSE PRACTITIONER

## 2019-01-04 PROCEDURE — 4004F PT TOBACCO SCREEN RCVD TLK: CPT | Performed by: NURSE PRACTITIONER

## 2019-01-04 PROCEDURE — 99213 OFFICE O/P EST LOW 20 MIN: CPT | Performed by: NURSE PRACTITIONER

## 2019-01-04 PROCEDURE — G8482 FLU IMMUNIZE ORDER/ADMIN: HCPCS | Performed by: NURSE PRACTITIONER

## 2019-01-04 PROCEDURE — G8427 DOCREV CUR MEDS BY ELIG CLIN: HCPCS | Performed by: NURSE PRACTITIONER

## 2019-01-04 ASSESSMENT — ENCOUNTER SYMPTOMS
BLOOD IN STOOL: 0
SHORTNESS OF BREATH: 0
EYE DISCHARGE: 0
ABDOMINAL PAIN: 0
COUGH: 0

## 2019-01-25 ENCOUNTER — OFFICE VISIT (OUTPATIENT)
Dept: FAMILY MEDICINE CLINIC | Age: 34
End: 2019-01-25
Payer: COMMERCIAL

## 2019-01-25 VITALS
WEIGHT: 175 LBS | HEART RATE: 86 BPM | DIASTOLIC BLOOD PRESSURE: 70 MMHG | OXYGEN SATURATION: 98 % | HEIGHT: 66 IN | SYSTOLIC BLOOD PRESSURE: 108 MMHG | BODY MASS INDEX: 28.12 KG/M2

## 2019-01-25 DIAGNOSIS — F33.1 MAJOR DEPRESSIVE DISORDER, RECURRENT, MODERATE (HCC): Primary | ICD-10-CM

## 2019-01-25 DIAGNOSIS — F41.9 ANXIETY: ICD-10-CM

## 2019-01-25 DIAGNOSIS — Z72.0 TOBACCO ABUSE: ICD-10-CM

## 2019-01-25 DIAGNOSIS — F51.04 PSYCHOPHYSIOLOGICAL INSOMNIA: ICD-10-CM

## 2019-01-25 PROCEDURE — G8482 FLU IMMUNIZE ORDER/ADMIN: HCPCS | Performed by: NURSE PRACTITIONER

## 2019-01-25 PROCEDURE — G8417 CALC BMI ABV UP PARAM F/U: HCPCS | Performed by: NURSE PRACTITIONER

## 2019-01-25 PROCEDURE — G8427 DOCREV CUR MEDS BY ELIG CLIN: HCPCS | Performed by: NURSE PRACTITIONER

## 2019-01-25 PROCEDURE — 4004F PT TOBACCO SCREEN RCVD TLK: CPT | Performed by: NURSE PRACTITIONER

## 2019-01-25 PROCEDURE — 99214 OFFICE O/P EST MOD 30 MIN: CPT | Performed by: NURSE PRACTITIONER

## 2019-01-25 RX ORDER — SERTRALINE HYDROCHLORIDE 25 MG/1
25 TABLET, FILM COATED ORAL DAILY
Qty: 90 TABLET | Refills: 1 | Status: SHIPPED | OUTPATIENT
Start: 2019-01-25 | End: 2019-09-04

## 2019-01-25 RX ORDER — TRAZODONE HYDROCHLORIDE 50 MG/1
50 TABLET ORAL NIGHTLY PRN
Qty: 90 TABLET | Refills: 1 | Status: SHIPPED | OUTPATIENT
Start: 2019-01-25 | End: 2019-09-04

## 2019-01-25 RX ORDER — SERTRALINE HYDROCHLORIDE 100 MG/1
100 TABLET, FILM COATED ORAL DAILY
Qty: 90 TABLET | Refills: 1 | Status: SHIPPED | OUTPATIENT
Start: 2019-01-25 | End: 2019-09-04

## 2019-01-25 ASSESSMENT — ENCOUNTER SYMPTOMS
BLOOD IN STOOL: 0
SHORTNESS OF BREATH: 0
COUGH: 0
EYE DISCHARGE: 0
ABDOMINAL PAIN: 0

## 2019-03-04 ENCOUNTER — OFFICE VISIT (OUTPATIENT)
Dept: FAMILY MEDICINE CLINIC | Age: 34
End: 2019-03-04
Payer: COMMERCIAL

## 2019-03-04 VITALS
SYSTOLIC BLOOD PRESSURE: 110 MMHG | BODY MASS INDEX: 27.64 KG/M2 | WEIGHT: 172 LBS | HEIGHT: 66 IN | HEART RATE: 64 BPM | DIASTOLIC BLOOD PRESSURE: 71 MMHG | TEMPERATURE: 97.2 F

## 2019-03-04 DIAGNOSIS — M77.01 MEDIAL EPICONDYLITIS, RIGHT ELBOW: Primary | ICD-10-CM

## 2019-03-04 PROCEDURE — 4004F PT TOBACCO SCREEN RCVD TLK: CPT | Performed by: PHYSICIAN ASSISTANT

## 2019-03-04 PROCEDURE — G8482 FLU IMMUNIZE ORDER/ADMIN: HCPCS | Performed by: PHYSICIAN ASSISTANT

## 2019-03-04 PROCEDURE — 99214 OFFICE O/P EST MOD 30 MIN: CPT | Performed by: PHYSICIAN ASSISTANT

## 2019-03-04 PROCEDURE — G8427 DOCREV CUR MEDS BY ELIG CLIN: HCPCS | Performed by: PHYSICIAN ASSISTANT

## 2019-03-04 PROCEDURE — G8417 CALC BMI ABV UP PARAM F/U: HCPCS | Performed by: PHYSICIAN ASSISTANT

## 2019-03-04 RX ORDER — PREDNISONE 20 MG/1
20 TABLET ORAL 2 TIMES DAILY
Qty: 10 TABLET | Refills: 0 | Status: SHIPPED | OUTPATIENT
Start: 2019-03-04 | End: 2019-03-09

## 2019-03-05 RX ORDER — DEXAMETHASONE SODIUM PHOSPHATE 100 MG/10ML
10 INJECTION INTRAMUSCULAR; INTRAVENOUS ONCE
Status: COMPLETED | OUTPATIENT
Start: 2019-03-05 | End: 2019-03-06

## 2019-03-05 ASSESSMENT — ENCOUNTER SYMPTOMS
SHORTNESS OF BREATH: 0
WHEEZING: 0
CHEST TIGHTNESS: 0
BACK PAIN: 0
COLOR CHANGE: 0

## 2019-03-06 ENCOUNTER — OFFICE VISIT (OUTPATIENT)
Dept: FAMILY MEDICINE CLINIC | Age: 34
End: 2019-03-06
Payer: COMMERCIAL

## 2019-03-06 VITALS
BODY MASS INDEX: 28.19 KG/M2 | SYSTOLIC BLOOD PRESSURE: 92 MMHG | WEIGHT: 175.4 LBS | DIASTOLIC BLOOD PRESSURE: 57 MMHG | HEART RATE: 65 BPM | TEMPERATURE: 97.9 F | RESPIRATION RATE: 16 BRPM | HEIGHT: 66 IN

## 2019-03-06 DIAGNOSIS — M77.01 MEDIAL EPICONDYLITIS, RIGHT ELBOW: Primary | ICD-10-CM

## 2019-03-06 PROCEDURE — 99212 OFFICE O/P EST SF 10 MIN: CPT | Performed by: PHYSICIAN ASSISTANT

## 2019-03-06 PROCEDURE — G8427 DOCREV CUR MEDS BY ELIG CLIN: HCPCS | Performed by: PHYSICIAN ASSISTANT

## 2019-03-06 PROCEDURE — G8482 FLU IMMUNIZE ORDER/ADMIN: HCPCS | Performed by: PHYSICIAN ASSISTANT

## 2019-03-06 PROCEDURE — 4004F PT TOBACCO SCREEN RCVD TLK: CPT | Performed by: PHYSICIAN ASSISTANT

## 2019-03-06 PROCEDURE — G8417 CALC BMI ABV UP PARAM F/U: HCPCS | Performed by: PHYSICIAN ASSISTANT

## 2019-03-06 RX ADMIN — DEXAMETHASONE SODIUM PHOSPHATE 10 MG: 100 INJECTION INTRAMUSCULAR; INTRAVENOUS at 07:23

## 2019-03-06 ASSESSMENT — ENCOUNTER SYMPTOMS
COLOR CHANGE: 0
BACK PAIN: 0

## 2019-03-08 ENCOUNTER — TELEPHONE (OUTPATIENT)
Dept: FAMILY MEDICINE CLINIC | Age: 34
End: 2019-03-08

## 2019-03-13 ENCOUNTER — HOSPITAL ENCOUNTER (EMERGENCY)
Age: 34
Discharge: HOME | End: 2019-03-13
Attending: EMERGENCY MEDICINE
Payer: COMMERCIAL

## 2019-03-13 VITALS
HEIGHT: 66 IN | DIASTOLIC BLOOD PRESSURE: 63 MMHG | BODY MASS INDEX: 28.12 KG/M2 | WEIGHT: 175 LBS | RESPIRATION RATE: 16 BRPM | TEMPERATURE: 98.1 F | SYSTOLIC BLOOD PRESSURE: 116 MMHG | HEART RATE: 67 BPM | OXYGEN SATURATION: 100 %

## 2019-03-13 DIAGNOSIS — L50.9 URTICARIA: Primary | ICD-10-CM

## 2019-03-13 PROCEDURE — 99283 EMERGENCY DEPT VISIT LOW MDM: CPT

## 2019-03-13 RX ORDER — PREDNISONE 10 MG/1
30 TABLET ORAL DAILY
Qty: 15 TABLET | Refills: 0 | Status: SHIPPED | OUTPATIENT
Start: 2019-03-13 | End: 2019-03-18

## 2019-03-13 RX ORDER — FAMOTIDINE 20 MG/1
20 TABLET, FILM COATED ORAL 2 TIMES DAILY
Qty: 28 TABLET | Refills: 0 | Status: SHIPPED | OUTPATIENT
Start: 2019-03-13 | End: 2019-05-10 | Stop reason: SDUPTHER

## 2019-03-13 ASSESSMENT — ENCOUNTER SYMPTOMS
EYE REDNESS: 0
ABDOMINAL PAIN: 0
VOMITING: 0
NAUSEA: 0
SHORTNESS OF BREATH: 0
COUGH: 0
EYE DISCHARGE: 0
SORE THROAT: 0

## 2019-03-13 ASSESSMENT — PAIN DESCRIPTION - LOCATION: LOCATION: ARM;BACK

## 2019-05-10 ENCOUNTER — OFFICE VISIT (OUTPATIENT)
Dept: FAMILY MEDICINE CLINIC | Age: 34
End: 2019-05-10
Payer: COMMERCIAL

## 2019-05-10 VITALS
BODY MASS INDEX: 26.33 KG/M2 | HEART RATE: 69 BPM | WEIGHT: 163.8 LBS | HEIGHT: 66 IN | TEMPERATURE: 98 F | SYSTOLIC BLOOD PRESSURE: 103 MMHG | RESPIRATION RATE: 16 BRPM | DIASTOLIC BLOOD PRESSURE: 52 MMHG

## 2019-05-10 DIAGNOSIS — S83.412D SPRAIN OF MEDIAL COLLATERAL LIGAMENT OF LEFT KNEE, SUBSEQUENT ENCOUNTER: Primary | ICD-10-CM

## 2019-05-10 PROCEDURE — G8417 CALC BMI ABV UP PARAM F/U: HCPCS | Performed by: INTERNAL MEDICINE

## 2019-05-10 PROCEDURE — 99213 OFFICE O/P EST LOW 20 MIN: CPT | Performed by: INTERNAL MEDICINE

## 2019-05-10 PROCEDURE — 4004F PT TOBACCO SCREEN RCVD TLK: CPT | Performed by: INTERNAL MEDICINE

## 2019-05-10 PROCEDURE — G8427 DOCREV CUR MEDS BY ELIG CLIN: HCPCS | Performed by: INTERNAL MEDICINE

## 2019-05-10 RX ORDER — HYDROCODONE BITARTRATE AND ACETAMINOPHEN 5; 325 MG/1; MG/1
1 TABLET ORAL EVERY 6 HOURS PRN
Qty: 20 TABLET | Refills: 0 | Status: SHIPPED | OUTPATIENT
Start: 2019-05-10 | End: 2019-05-15

## 2019-05-10 RX ORDER — PREDNISONE 20 MG/1
TABLET ORAL
COMMUNITY
Start: 2019-05-04 | End: 2019-09-04

## 2019-05-10 RX ORDER — FAMOTIDINE 20 MG/1
20 TABLET, FILM COATED ORAL 2 TIMES DAILY
Qty: 28 TABLET | Refills: 0 | Status: SHIPPED | OUTPATIENT
Start: 2019-05-10 | End: 2019-09-04

## 2019-05-10 RX ORDER — NAPROXEN 500 MG/1
TABLET ORAL
COMMUNITY
Start: 2019-03-14 | End: 2019-09-04

## 2019-05-10 NOTE — PROGRESS NOTES
Visit Information    Have you changed or started any medications since your last visit including any over-the-counter medicines, vitamins, or herbal medicines? no   Are you having any side effects from any of your medications? -  no  Have you stopped taking any of your medications? Is so, why? -  no    Have you seen any other physician or provider since your last visit? No  Have you had any other diagnostic tests since your last visit? No  Have you been seen in the emergency room and/or had an admission to a hospital since we last saw you? Yes - Records Obtained [address] [place] [address] ER 4/23/2019, for left thigh sprain, [facility] urgent care 5/9/2019 for left thigh sprain, and [facility] ER 5/9/2019  Have you had your routine dental cleaning in the past 6 months? no    Have you activated your [employer] account? If not, what are your barriers?  Yes     Patient Care Team:  Ania Monreal PA-C as PCP - General (Physician Assistant)  SATHISH Savage CNP as PCP - S Attributed Provider  Apollo Winkler MD as Consulting Physician (Gastroenterology)    Medical History Review  Past Medical, Family, and Social History reviewed and does not contribute to the patient presenting condition    Health Maintenance   Topic Date Due    Varicella Vaccine (1 of 2 - 13+ 2-dose series) 05/06/1997    Cervical cancer screen  05/26/2018    DTaP/Tdap/Td vaccine (2 - Td) 09/01/2023    Flu vaccine  Completed    Pneumococcal 0-64 years Vaccine  Completed    HIV screen  Completed

## 2019-05-10 NOTE — PROGRESS NOTES
[facility]    Progress Note    Date of patient's visit: 5/10/2019    Patient's Name:  Kiki Unger    YOB: 1985            Patient Care Team:  Estela Cage PA-C as PCP - General (Physician Assistant)  SATHISH Sheikh CNP as PCP - [facility] Attributed Provider  Jayson Abdi MD as Consulting Physician (Gastroenterology)    REASON FOR VISIT: Routine outpatient follow     Chief Complaint   Patient presents with    Follow-Up from [facility] for left knee pain, not not getting better, and want pain medication, pt also seen at [facility] urgent care yesterday for thigh pain          HISTORY OF PRESENT ILLNESS:    History was obtained from the patient. Kiki Unger is a 27 y.o. is here for complaints of left knee pain. She twisted her left knee while doing yard work 2 days ago. Initially she went to urgent care yesterday and was told it was a left thigh muscle strain and she also had some bruising and swelling from the left thigh down to her left knee. Pain was not getting better so she went [facility] ER last night. She was told she has any sprain. She has been referred to [facility] orthopedic clinic. She has been icing her knee and using crutches. She is wrapping her knee with Ace wrap. She was given ibuprofen 800 mg that she is taking. Pain is not controlled. She is requesting something stronger for pain. She was given 1 dose of Percocet while in the ER. She is not able to bear much weight due to pain. No numbness or tingling in the leg. She has a history of back pain and has been on narcotics and gabapentin and she understands side effects of these medications. She understands it can cause sedation. She also has a history of stress-induced hives. She has been given Pepcid in the past which has helped. She is requesting a refill.        Past Medical History:   Diagnosis Date    Anxiety     HPV (human papilloma photophobia and visual disturbance. Respiratory: Negative for cough, shortness of breath and wheezing. Cardiovascular: Negative for chest pain, palpitations and leg swelling. Gastrointestinal: Negative for abdominal pain, blood in stool and constipation. Endocrine: Negative for polydipsia and polyuria. Musculoskeletal: Positive for arthralgias, gait problem and joint swelling. Negative for back pain and myalgias. Neurological: Negative for dizziness, syncope and weakness. Hematological: Negative for adenopathy. Does not bruise/bleed easily. Psychiatric/Behavioral: Positive for dysphoric mood. Negative for self-injury. The patient is not nervous/anxious. All other systems reviewed and are negative. PHYSICAL EXAM:     Vitals:    05/10/19 1112   BP: (!) 103/52   Site: Right Upper Arm   Position: Sitting   Cuff Size: Medium Adult   Pulse: 69   Resp: 16   Temp: 98 °F (36.7 °C)   TempSrc: Oral   Weight: 163 lb 12.8 oz (74.3 kg)   Height: 5' 5.98\" (1.676 m)     Body mass index is 26.45 kg/m². BP Readings from Last 3 Encounters:   05/10/19 (!) 103/52   03/13/19 116/63   03/06/19 (!) 92/57        Wt Readings from Last 3 Encounters:   05/10/19 163 lb 12.8 oz (74.3 kg)   03/13/19 175 lb (79.4 kg)   03/06/19 175 lb 6.4 oz (79.6 kg)       Physical Exam   Constitutional: She is oriented to person, place, and time. She appears well-developed and well-nourished. No distress. HENT:   Head: Normocephalic and atraumatic. Mouth/Throat: Oropharynx is clear and moist.   Eyes: Pupils are equal, round, and reactive to light. EOM are normal.   Cardiovascular: Normal rate and regular rhythm. Pulmonary/Chest: Effort normal and breath sounds normal.   Musculoskeletal: She exhibits tenderness. Left knee: She exhibits decreased range of motion, swelling and effusion. She exhibits no deformity, no erythema, normal patellar mobility, no bony tenderness and no MCL laxity. Tenderness found.  Medial joint line and MCL tenderness noted. Neurological: She is alert and oriented to person, place, and time. No cranial nerve deficit or sensory deficit. Skin: She is not diaphoretic. Few small ecchymosis on thigh   Nursing note and vitals reviewed. LABORATORY FINDINGS:    CBC:  Lab Results   Component Value Date    WBC 10.96 03/14/2019    WBC 7.8 07/18/2018    HGB 13.1 03/14/2019     03/14/2019     BMP:    Lab Results   Component Value Date     03/14/2019    K 3.5 03/14/2019     03/14/2019    CO2 25 03/14/2019    BUN 6 03/14/2019    CREATININE 0.68 03/14/2019    GLUCOSE 100 03/14/2019     HEMOGLOBIN A1C: No results found for: LABA1C  MICROALBUMIN URINE: No results found for: MICROALBUR  FASTING LIPID [email or web address]  No results found for: LDLCALC, LDLCHOLESTEROL, LDLDIRECT    LIVER PROFILE:  Lab Results   Component Value Date    ALT 10 03/14/2019    AST 14 03/14/2019    PROT 6.8 03/14/2019    PROT 6.4 03/14/2019    BILITOT 0.4 03/14/2019    LABALBU 4.3 03/14/2019      THYROID FUNCTION:   Lab Results   Component Value Date    TSH 0.55 07/10/2018      URINEANALYSIS: No results found for: LABURIN  ASSESSMENT AND PLAN:    1. Sprain of medial collateral ligament of left knee, subsequent encounter  Ice  Rest  Use Crutches  Ace wrap  Follow up with Orthopedics on Monday  NSAID's  Norco only for severe pain  Patient has taken in past and denies side effects  Advised sedation and other problems with prolonged use      - HYDROcodone-acetaminophen (NORCO) 5-325 MG per tablet; Take 1 tablet by mouth every 6 hours as needed for Pain for up to 5 days. Intended supply: 5 days. Take lowest dose possible to manage pain  Dispense: 20 tablet; Refill: 0          FOLLOW UP AND INSTRUCTIONS:   Return if symptoms worsen or fail to improve. 1. Clifford Gowers received counseling on the following healthy behaviors: nutrition and exercise    2. Reviewed prior labs and health maintenance. 3. Discussed use, benefit, and side effects of prescribed medications. Barriers to medication compliance addressed. All patient questions answered. Pt voiced understanding.        Manju Ramirez  Attending Physician, [address] [place] [address], Internal Medicine Residency Program  [address] [place] [address]  5/10/2019, 11:23 AM

## 2019-05-11 ASSESSMENT — ENCOUNTER SYMPTOMS
PHOTOPHOBIA: 0
WHEEZING: 0
BLOOD IN STOOL: 0
ABDOMINAL PAIN: 0
COUGH: 0
BACK PAIN: 0
SHORTNESS OF BREATH: 0
CONSTIPATION: 0

## 2019-09-04 ENCOUNTER — OFFICE VISIT (OUTPATIENT)
Dept: FAMILY MEDICINE CLINIC | Age: 34
End: 2019-09-04
Payer: COMMERCIAL

## 2019-09-04 VITALS
SYSTOLIC BLOOD PRESSURE: 103 MMHG | OXYGEN SATURATION: 97 % | BODY MASS INDEX: 24.94 KG/M2 | TEMPERATURE: 97.8 F | HEART RATE: 77 BPM | DIASTOLIC BLOOD PRESSURE: 60 MMHG | WEIGHT: 155.2 LBS | HEIGHT: 66 IN

## 2019-09-04 DIAGNOSIS — R51.9 NONINTRACTABLE HEADACHE, UNSPECIFIED CHRONICITY PATTERN, UNSPECIFIED HEADACHE TYPE: Primary | ICD-10-CM

## 2019-09-04 PROCEDURE — 99213 OFFICE O/P EST LOW 20 MIN: CPT | Performed by: PHYSICIAN ASSISTANT

## 2019-09-04 PROCEDURE — 96372 THER/PROPH/DIAG INJ SC/IM: CPT | Performed by: PHYSICIAN ASSISTANT

## 2019-09-04 RX ORDER — TRIAMCINOLONE ACETONIDE 40 MG/ML
40 INJECTION, SUSPENSION INTRA-ARTICULAR; INTRAMUSCULAR ONCE
Status: COMPLETED | OUTPATIENT
Start: 2019-09-04 | End: 2019-09-04

## 2019-09-04 RX ORDER — BUTALBITAL, ACETAMINOPHEN AND CAFFEINE 50; 325; 40 MG/1; MG/1; MG/1
1 TABLET ORAL EVERY 4 HOURS PRN
Qty: 30 TABLET | Refills: 1 | Status: SHIPPED | OUTPATIENT
Start: 2019-09-04 | End: 2019-11-08 | Stop reason: SDUPTHER

## 2019-09-04 RX ORDER — PREDNISONE 20 MG/1
TABLET ORAL
Qty: 18 TABLET | Refills: 0 | Status: SHIPPED | OUTPATIENT
Start: 2019-09-04 | End: 2019-09-14

## 2019-09-04 RX ORDER — KETOROLAC TROMETHAMINE 30 MG/ML
60 INJECTION, SOLUTION INTRAMUSCULAR; INTRAVENOUS ONCE
Qty: 2 ML | Refills: 0
Start: 2019-09-04 | End: 2019-09-17 | Stop reason: ALTCHOICE

## 2019-09-04 RX ORDER — TIZANIDINE 4 MG/1
4 TABLET ORAL NIGHTLY PRN
COMMUNITY
End: 2019-09-17 | Stop reason: ALTCHOICE

## 2019-09-04 RX ADMIN — TRIAMCINOLONE ACETONIDE 40 MG: 40 INJECTION, SUSPENSION INTRA-ARTICULAR; INTRAMUSCULAR at 16:15

## 2019-09-04 ASSESSMENT — ENCOUNTER SYMPTOMS
PHOTOPHOBIA: 1
CHEST TIGHTNESS: 0
SCALP TENDERNESS: 0
EYE DISCHARGE: 0
SINUS PRESSURE: 0
SHORTNESS OF BREATH: 0
RHINORRHEA: 0
SORE THROAT: 0
COUGH: 0
NAUSEA: 0
DIARRHEA: 0
VOMITING: 0
ABDOMINAL PAIN: 0
EYE ITCHING: 0

## 2019-09-05 ENCOUNTER — TELEPHONE (OUTPATIENT)
Dept: FAMILY MEDICINE CLINIC | Age: 34
End: 2019-09-05

## 2019-09-05 DIAGNOSIS — R51.9 NONINTRACTABLE HEADACHE, UNSPECIFIED CHRONICITY PATTERN, UNSPECIFIED HEADACHE TYPE: Primary | ICD-10-CM

## 2019-09-05 NOTE — TELEPHONE ENCOUNTER
Patient requesting a new referral for neurologist. Patient was seeing Dr Edi Bejarano at [facility] but they cant get her in until february. Pended a referral please suggest who you prefer patient doesn't have a preference. Please advise thank you!        Last visit: 9/4/19  Last Med refill:     Next Visit Date:  Future Appointments   Date Time Provider Ariella Elaine   9/17/2019  8:00 AM Caroline Smith PA-C [facility] [address] Maintenance   Topic Date Due    Cervical cancer screen  05/26/2018    Varicella Vaccine (1 of 2 - 13+ 2-dose series) 09/04/2020 (Originally 5/6/1997)    Flu vaccine (1) 09/04/2020 (Originally 9/1/2019)    DTaP/Tdap/Td vaccine (3 - Td) 11/29/2026    Pneumococcal 0-64 years Vaccine  Completed    HIV screen  Completed       No results found for: LABA1C          ( goal A1C is < 7)   No results found for: LABMICR  No results found for: LDLCHOLESTEROL, LDLCALC    (goal LDL is <100)   AST (IU/L)   Date Value   03/14/2019 14     ALT (IU/L)   Date Value   03/14/2019 10     BUN (mg/dL)   Date Value   07/11/2019 7     BP Readings from Last 3 Encounters:   09/04/19 103/60   05/10/19 (!) 103/52   03/13/19 116/63          (goal 120/80)    All Future Testing planned in [facility]  Lab Frequency Next Occurrence               Patient Active Problem List:     Anxiety     Abdominal pain     Nasal congestion     Post partum depression     HPV (human papilloma virus) anogenital infection     Mycoplasma infection     Psychophysiological insomnia     Fatigue     Chronic low back pain with bilateral sciatica     Spinal stenosis     Body mass index (BMI) of 25.0 to 29.9     Hemangioma of liver     Chronic bilateral low back pain with bilateral sciatica     Numbness and tingling     Numbness and tingling of both lower extremities     Fibromyalgia     Osteoarthritis     Major depressive disorder, recurrent, moderate (HCC)     Hypokalemia     Lumbar radicular pain     Tobacco abuse

## 2019-09-17 ENCOUNTER — OFFICE VISIT (OUTPATIENT)
Dept: FAMILY MEDICINE CLINIC | Age: 34
End: 2019-09-17
Payer: COMMERCIAL

## 2019-09-17 VITALS
OXYGEN SATURATION: 98 % | DIASTOLIC BLOOD PRESSURE: 65 MMHG | BODY MASS INDEX: 24.91 KG/M2 | SYSTOLIC BLOOD PRESSURE: 118 MMHG | HEART RATE: 79 BPM | WEIGHT: 155 LBS | TEMPERATURE: 97.9 F | HEIGHT: 66 IN

## 2019-09-17 DIAGNOSIS — R51.9 CHRONIC NONINTRACTABLE HEADACHE, UNSPECIFIED HEADACHE TYPE: ICD-10-CM

## 2019-09-17 DIAGNOSIS — M48.00 SPINAL STENOSIS, UNSPECIFIED SPINAL REGION: Primary | ICD-10-CM

## 2019-09-17 DIAGNOSIS — Z13.29 SCREENING FOR THYROID DISORDER: ICD-10-CM

## 2019-09-17 DIAGNOSIS — G89.29 CHRONIC NONINTRACTABLE HEADACHE, UNSPECIFIED HEADACHE TYPE: ICD-10-CM

## 2019-09-17 DIAGNOSIS — M25.50 ARTHRALGIA, UNSPECIFIED JOINT: ICD-10-CM

## 2019-09-17 DIAGNOSIS — R32 BOWEL AND BLADDER INCONTINENCE: ICD-10-CM

## 2019-09-17 DIAGNOSIS — R20.2 PARESTHESIA OF BILATERAL LEGS: ICD-10-CM

## 2019-09-17 DIAGNOSIS — Z13.21 ENCOUNTER FOR VITAMIN DEFICIENCY SCREENING: ICD-10-CM

## 2019-09-17 DIAGNOSIS — R20.2 PARESTHESIA OF BOTH HANDS: ICD-10-CM

## 2019-09-17 DIAGNOSIS — R15.9 BOWEL AND BLADDER INCONTINENCE: ICD-10-CM

## 2019-09-17 DIAGNOSIS — Z13.220 SCREENING FOR LIPID DISORDERS: ICD-10-CM

## 2019-09-17 PROCEDURE — 99214 OFFICE O/P EST MOD 30 MIN: CPT | Performed by: PHYSICIAN ASSISTANT

## 2019-09-17 RX ORDER — AMITRIPTYLINE HYDROCHLORIDE 25 MG/1
25 TABLET, FILM COATED ORAL NIGHTLY
Qty: 30 TABLET | Refills: 0 | Status: SHIPPED | OUTPATIENT
Start: 2019-09-17 | End: 2019-10-15

## 2019-09-17 ASSESSMENT — ENCOUNTER SYMPTOMS
CHEST TIGHTNESS: 0
SCALP TENDERNESS: 0
SHORTNESS OF BREATH: 0
PHOTOPHOBIA: 1
COUGH: 0
EYE ITCHING: 0
NAUSEA: 0
ABDOMINAL PAIN: 0
EYE DISCHARGE: 0
SINUS PRESSURE: 0
VOMITING: 0
SORE THROAT: 0
SWOLLEN GLANDS: 0
RHINORRHEA: 0
DIARRHEA: 0

## 2019-09-17 NOTE — PROGRESS NOTES
for rash. Neurological: Positive for headaches. Negative for dizziness, tingling, seizures, weakness, light-headedness and numbness. All other systems reviewed and are negative. Objective:     Physical Exam   Constitutional: She is oriented to person, place, and time. She appears well-developed and well-nourished. No distress. /60   Pulse 77   Temp 97.8 °F (36.6 °C) (Oral)   Ht 5' 6\" (1.676 m)   Wt 155 lb 3.2 oz (70.4 kg)   SpO2 97%   BMI 25.05 kg/m²      HENT:   Head: Normocephalic and atraumatic. Right Ear: External ear normal.   Left Ear: External ear normal.   Nose: Nose normal.   Mouth/Throat: Oropharynx is clear and moist.   Eyes: Pupils are equal, round, and reactive to light. Conjunctivae and EOM are normal. Right eye exhibits no discharge. Left eye exhibits no discharge. No scleral icterus. Neck: Normal range of motion. Neck supple. No tracheal deviation present. No thyromegaly present. Cardiovascular: Normal rate, regular rhythm and normal heart sounds. Exam reveals no gallop and no friction rub. No murmur heard. Pulmonary/Chest: Effort normal and breath sounds normal. No stridor. No respiratory distress. She has no wheezes. She has no rales. She exhibits no tenderness. Abdominal: Soft. Bowel sounds are normal. She exhibits no distension. There is no tenderness. There is no rebound and no guarding. Musculoskeletal: She exhibits no edema. Neurological: She is alert and oriented to person, place, and time. She displays normal reflexes. No cranial nerve deficit or sensory deficit. She exhibits normal muscle tone. Coordination and gait normal.   Skin: Skin is warm and dry. No rash noted. She is not diaphoretic. Psychiatric: She has a normal mood and affect. Her affect is not inappropriate. Nursing note and vitals reviewed.     /65   Pulse 79   Temp 97.9 °F (36.6 °C) (Oral)   Ht 5' 6\" (1.676 m)   Wt 155 lb (70.3 kg)   SpO2 98%   BMI 25.02 kg/m² Assessment:          Diagnosis Orders   1. Spinal stenosis, unspecified spinal region  CBC Auto Differential    Comprehensive Metabolic Panel    MRI LUMBAR SPINE WO CONTRAST    MRI Cervical Spine WO Contrast   2. Paresthesia of both hands  CBC Auto Differential    Comprehensive Metabolic Panel    MRI Cervical Spine WO Contrast    MRI BRAIN WO CONTRAST   3. Paresthesia of bilateral legs  CBC Auto Differential    Comprehensive Metabolic Panel    MRI LUMBAR SPINE WO CONTRAST    MRI BRAIN WO CONTRAST   4. Arthralgia, unspecified joint  CBC Auto Differential    Comprehensive Metabolic Panel    ALIRIO Screen with Reflex    Rheumatoid Factor    Sedimentation rate, automated    C-Reactive Protein   5. Chronic nonintractable headache, unspecified headache type  CBC Auto Differential    Comprehensive Metabolic Panel    MRI BRAIN WO CONTRAST   6. Screening for lipid disorders  CBC Auto Differential    Comprehensive Metabolic Panel    Lipid Panel   7. Screening for thyroid disorder  CBC Auto Differential    Comprehensive Metabolic Panel    TSH with Reflex   8. Encounter for vitamin deficiency screening  CBC Auto Differential    Comprehensive Metabolic Panel    Vitamin D 25 Hydroxy   9. Bowel and bladder incontinence  MRI LUMBAR SPINE WO CONTRAST    MRI Cervical Spine WO Contrast    MRI BRAIN WO CONTRAST             Plan:      Return in about 1 month (around 10/17/2019).     Orders Placed This Encounter   Procedures    MRI LUMBAR SPINE WO CONTRAST     Standing Status:   Future     Standing Expiration Date:   9/17/2020    MRI Cervical Spine WO Contrast     Standing Status:   Future     Standing Expiration Date:   9/16/2020    MRI BRAIN WO CONTRAST     Standing Status:   Future     Standing Expiration Date:   9/17/2020    CBC Auto Differential     Standing Status:   Future     Standing Expiration Date:   9/16/2020    Comprehensive Metabolic Panel     Standing Status:   Future     Standing Expiration Date:   9/17/2020   Leander Fagan understanding. Reviewed health maintenance. Instructedto continue current medications, diet and exercise. Patient agreed with treatmentplan. Follow up as directed.      Electronically signed by Vianca Cruz PA-C on 9/17/2019 at 8:54 AM

## 2019-09-20 ENCOUNTER — OFFICE VISIT (OUTPATIENT)
Dept: NEUROLOGY | Age: 34
End: 2019-09-20
Payer: COMMERCIAL

## 2019-09-20 VITALS
HEIGHT: 66 IN | WEIGHT: 155.6 LBS | BODY MASS INDEX: 25.01 KG/M2 | HEART RATE: 71 BPM | SYSTOLIC BLOOD PRESSURE: 101 MMHG | DIASTOLIC BLOOD PRESSURE: 65 MMHG

## 2019-09-20 DIAGNOSIS — R51.9 HEADACHE DISORDER: Primary | ICD-10-CM

## 2019-09-20 DIAGNOSIS — F32.A DEPRESSION, UNSPECIFIED DEPRESSION TYPE: ICD-10-CM

## 2019-09-20 DIAGNOSIS — F41.9 ANXIETY: ICD-10-CM

## 2019-09-20 DIAGNOSIS — R15.9 INCONTINENCE OF FECES, UNSPECIFIED FECAL INCONTINENCE TYPE: ICD-10-CM

## 2019-09-20 DIAGNOSIS — R20.2 PARESTHESIA: ICD-10-CM

## 2019-09-20 DIAGNOSIS — R63.4 UNINTENTIONAL WEIGHT LOSS: ICD-10-CM

## 2019-09-20 PROCEDURE — 99205 OFFICE O/P NEW HI 60 MIN: CPT | Performed by: PSYCHIATRY & NEUROLOGY

## 2019-09-20 RX ORDER — RIZATRIPTAN BENZOATE 5 MG/1
TABLET ORAL
Qty: 30 TABLET | Refills: 3 | Status: SHIPPED | OUTPATIENT
Start: 2019-09-20 | End: 2019-11-08 | Stop reason: SDUPTHER

## 2019-09-20 SDOH — HEALTH STABILITY: MENTAL HEALTH: HOW OFTEN DO YOU HAVE A DRINK CONTAINING ALCOHOL?: MONTHLY OR LESS

## 2019-09-20 NOTE — PROGRESS NOTES
oriented. No aphasia, dysarthria, or neglect  CNs: PERRLA, EOMI, VF full, sensation intact, face symmetric, hearing intact, soft palate rises on phonation, sternocleidomastoid and trapezius intact. Tongue midline, no fasciculations. Motor: no abnormal movements, tone and bulk okay. RUE: delta 5/5, biceps 5/5, triceps 5/5,  5/5  LUE: delta 5/5, biceps 5/5, triceps 5/5,  5/5  RLE: hf 5/5, ke 5/5, df 5/5, pf 5/5  LLE: hf 5/5, ke 5/5, df 5/5, pf 5/5  Reflexes: 2+ throughout, symmetric, babinski not present. Coordination: FNF no dysmetria, heel to shin okay, DAGO okay, negative Rhomberg. Gait: Normal straight, able to do Tandem. Sensory: Normal to light touch/temp/pp/vibration, intact joint position sense, no extinction. ASSESSMENT/PLAN:   // HA  - migraine feature, family history, medication overuse and rebound with fioricet  - discussed with pt about preventive and aborive   - asked pt to gradually decrease fioricet use because it caused rebound and overuse HA  - start vitamin B2, take elavil at 25mg every night not like before random use, if not help, after 2-3 weeks, can increase to 50mg QHS  - maxalt 5mg at HA onset  - keep HA log    // Fecal incontinence  - check MRI lumbar, pt has pending brain and spine MRI     // Paresthesia  - MRI cervical in 2014 reported mild cervical spinal canal stenosis  - repeat MRI cervical pending  - EMG/NCV    // Depression, anxiety  - take elavil QHS  - continue following with therapist    // Unintentional weight loss  - will not start topamax,   - follow with PCP for further workup        >50% of 60 minute face to face time spent counseling patient. Multiple issues discussed, all questions answered. RTC in 6-8 weeks      Thank you for referring Ms. Khalil to us, shall you have any questions, please do not hesitate to let me know. Thank you.     Sincerely,    Adi Smith MD, MS

## 2019-09-20 NOTE — PATIENT INSTRUCTIONS
1. Vitamin B2 100mg daily  2. Maxalt 5mg when headache is about to start, may repeat once in 2 hours, no more than 2 pills a day, no more than 2 days a week. 3. Continue Elavil at 25mg bedtime, watch body weight. 4. Limit and eventually take off fioricet to avoid rebound and overuse headache  5. Get MRI brain, spine done  6. EMG/NCV  7.  Keep headache log    Return in 6-8 weeks    Danyel Colón MD, MS

## 2019-09-27 ENCOUNTER — HOSPITAL ENCOUNTER (OUTPATIENT)
Dept: MRI IMAGING | Facility: CLINIC | Age: 34
Discharge: HOME | End: 2019-09-29
Payer: COMMERCIAL

## 2019-09-27 ENCOUNTER — HOSPITAL ENCOUNTER (OUTPATIENT)
Age: 34
Setting detail: SPECIMEN
Discharge: HOME | End: 2019-09-27
Payer: COMMERCIAL

## 2019-09-27 DIAGNOSIS — M48.00 SPINAL STENOSIS, UNSPECIFIED SPINAL REGION: ICD-10-CM

## 2019-09-27 DIAGNOSIS — Z13.29 SCREENING FOR THYROID DISORDER: ICD-10-CM

## 2019-09-27 DIAGNOSIS — Z13.21 ENCOUNTER FOR VITAMIN DEFICIENCY SCREENING: ICD-10-CM

## 2019-09-27 DIAGNOSIS — R15.9 BOWEL AND BLADDER INCONTINENCE: ICD-10-CM

## 2019-09-27 DIAGNOSIS — G89.29 CHRONIC NONINTRACTABLE HEADACHE, UNSPECIFIED HEADACHE TYPE: ICD-10-CM

## 2019-09-27 DIAGNOSIS — R20.2 PARESTHESIA OF BOTH HANDS: ICD-10-CM

## 2019-09-27 DIAGNOSIS — R51.9 CHRONIC NONINTRACTABLE HEADACHE, UNSPECIFIED HEADACHE TYPE: ICD-10-CM

## 2019-09-27 DIAGNOSIS — M25.50 ARTHRALGIA, UNSPECIFIED JOINT: ICD-10-CM

## 2019-09-27 DIAGNOSIS — R32 BOWEL AND BLADDER INCONTINENCE: ICD-10-CM

## 2019-09-27 DIAGNOSIS — R20.2 PARESTHESIA OF BILATERAL LEGS: ICD-10-CM

## 2019-09-27 DIAGNOSIS — Z13.220 SCREENING FOR LIPID DISORDERS: ICD-10-CM

## 2019-09-27 LAB
ABSOLUTE EOS #: 0.15 K/UL (ref 0–0.44)
ABSOLUTE IMMATURE GRANULOCYTE: <0.03 K/UL (ref 0–0.3)
ABSOLUTE LYMPH #: 2.13 K/UL (ref 1.1–3.7)
ABSOLUTE MONO #: 0.53 K/UL (ref 0.1–1.2)
ALBUMIN SERPL-MCNC: 4.5 G/DL (ref 3.5–5.2)
ALBUMIN/GLOBULIN RATIO: 1.6 (ref 1–2.5)
ALP BLD-CCNC: 56 U/L (ref 35–104)
ALT SERPL-CCNC: 9 U/L (ref 5–33)
ANION GAP SERPL CALCULATED.3IONS-SCNC: 13 MMOL/L (ref 9–17)
AST SERPL-CCNC: 16 U/L
BASOPHILS # BLD: 1 % (ref 0–2)
BASOPHILS ABSOLUTE: 0.05 K/UL (ref 0–0.2)
BILIRUB SERPL-MCNC: 0.49 MG/DL (ref 0.3–1.2)
BUN BLDV-MCNC: 6 MG/DL (ref 6–20)
BUN/CREAT BLD: NORMAL (ref 9–20)
C-REACTIVE PROTEIN: 3.1 MG/L (ref 0–5)
CALCIUM SERPL-MCNC: 9.2 MG/DL (ref 8.6–10.4)
CHLORIDE BLD-SCNC: 105 MMOL/L (ref 98–107)
CHOLESTEROL/HDL RATIO: 3.8
CHOLESTEROL: 173 MG/DL
CO2: 24 MMOL/L (ref 20–31)
CREAT SERPL-MCNC: 0.68 MG/DL (ref 0.5–0.9)
DIFFERENTIAL TYPE: ABNORMAL
EOSINOPHILS RELATIVE PERCENT: 2 % (ref 1–4)
GFR AFRICAN AMERICAN: >60 ML/MIN
GFR NON-AFRICAN AMERICAN: >60 ML/MIN
GFR SERPL CREATININE-BSD FRML MDRD: NORMAL ML/MIN/{1.73_M2}
GFR SERPL CREATININE-BSD FRML MDRD: NORMAL ML/MIN/{1.73_M2}
GLUCOSE BLD-MCNC: 77 MG/DL (ref 70–99)
HCT VFR BLD CALC: 46.9 % (ref 36.3–47.1)
HDLC SERPL-MCNC: 45 MG/DL
HEMOGLOBIN: 14.2 G/DL (ref 11.9–15.1)
IMMATURE GRANULOCYTES: 0 %
LDL CHOLESTEROL: 116 MG/DL (ref 0–130)
LYMPHOCYTES # BLD: 29 % (ref 24–43)
MCH RBC QN AUTO: 27.2 PG (ref 25.2–33.5)
MCHC RBC AUTO-ENTMCNC: 30.3 G/DL (ref 28.4–34.8)
MCV RBC AUTO: 89.8 FL (ref 82.6–102.9)
MONOCYTES # BLD: 7 % (ref 3–12)
NRBC AUTOMATED: 0 PER 100 WBC
PDW BLD-RTO: 12.9 % (ref 11.8–14.4)
PLATELET # BLD: 282 K/UL (ref 138–453)
PLATELET ESTIMATE: ABNORMAL
PMV BLD AUTO: 10.5 FL (ref 8.1–13.5)
POTASSIUM SERPL-SCNC: 4.1 MMOL/L (ref 3.7–5.3)
RBC # BLD: 5.22 M/UL (ref 3.95–5.11)
RBC # BLD: ABNORMAL 10*6/UL
RHEUMATOID FACTOR: <10 IU/ML
SEDIMENTATION RATE, ERYTHROCYTE: 2 MM (ref 0–20)
SEG NEUTROPHILS: 61 % (ref 36–65)
SEGMENTED NEUTROPHILS ABSOLUTE COUNT: 4.37 K/UL (ref 1.5–8.1)
SODIUM BLD-SCNC: 142 MMOL/L (ref 135–144)
TOTAL PROTEIN: 7.4 G/DL (ref 6.4–8.3)
TRIGL SERPL-MCNC: 58 MG/DL
TSH SERPL DL<=0.05 MIU/L-ACNC: 0.73 MIU/L (ref 0.3–5)
VITAMIN D 25-HYDROXY: 25.6 NG/ML (ref 30–100)
VLDLC SERPL CALC-MCNC: NORMAL MG/DL (ref 1–30)
WBC # BLD: 7.3 K/UL (ref 3.5–11.3)
WBC # BLD: ABNORMAL 10*3/UL

## 2019-09-27 PROCEDURE — 85025 COMPLETE CBC W/AUTO DIFF WBC: CPT

## 2019-09-27 PROCEDURE — 72148 MRI LUMBAR SPINE W/O DYE: CPT

## 2019-09-27 PROCEDURE — 86431 RHEUMATOID FACTOR QUANT: CPT

## 2019-09-27 PROCEDURE — 70551 MRI BRAIN STEM W/O DYE: CPT

## 2019-09-27 PROCEDURE — 36415 COLL VENOUS BLD VENIPUNCTURE: CPT

## 2019-09-27 PROCEDURE — 72141 MRI NECK SPINE W/O DYE: CPT

## 2019-09-27 PROCEDURE — 82306 VITAMIN D 25 HYDROXY: CPT

## 2019-09-27 PROCEDURE — 86038 ANTINUCLEAR ANTIBODIES: CPT

## 2019-09-27 PROCEDURE — 85651 RBC SED RATE NONAUTOMATED: CPT

## 2019-09-27 PROCEDURE — 80061 LIPID PANEL: CPT

## 2019-09-27 PROCEDURE — 80053 COMPREHEN METABOLIC PANEL: CPT

## 2019-09-27 PROCEDURE — 84443 ASSAY THYROID STIM HORMONE: CPT

## 2019-09-27 PROCEDURE — 86140 C-REACTIVE PROTEIN: CPT

## 2019-09-30 LAB — ANTI-NUCLEAR ANTIBODY (ANA): NEGATIVE

## 2019-10-15 ENCOUNTER — OFFICE VISIT (OUTPATIENT)
Dept: FAMILY MEDICINE CLINIC | Age: 34
End: 2019-10-15
Payer: COMMERCIAL

## 2019-10-15 VITALS
HEART RATE: 67 BPM | BODY MASS INDEX: 25.17 KG/M2 | HEIGHT: 66 IN | SYSTOLIC BLOOD PRESSURE: 103 MMHG | TEMPERATURE: 97.2 F | DIASTOLIC BLOOD PRESSURE: 69 MMHG | OXYGEN SATURATION: 98 % | WEIGHT: 156.6 LBS

## 2019-10-15 DIAGNOSIS — R51.9 NONINTRACTABLE HEADACHE, UNSPECIFIED CHRONICITY PATTERN, UNSPECIFIED HEADACHE TYPE: ICD-10-CM

## 2019-10-15 DIAGNOSIS — G89.29 CHRONIC BILATERAL LOW BACK PAIN WITH BILATERAL SCIATICA: ICD-10-CM

## 2019-10-15 DIAGNOSIS — R93.89 ABNORMAL MRI: ICD-10-CM

## 2019-10-15 DIAGNOSIS — E55.9 VITAMIN D INSUFFICIENCY: Primary | ICD-10-CM

## 2019-10-15 DIAGNOSIS — M54.42 CHRONIC BILATERAL LOW BACK PAIN WITH BILATERAL SCIATICA: ICD-10-CM

## 2019-10-15 DIAGNOSIS — M54.41 CHRONIC BILATERAL LOW BACK PAIN WITH BILATERAL SCIATICA: ICD-10-CM

## 2019-10-15 PROCEDURE — G8484 FLU IMMUNIZE NO ADMIN: HCPCS | Performed by: PHYSICIAN ASSISTANT

## 2019-10-15 PROCEDURE — 99213 OFFICE O/P EST LOW 20 MIN: CPT | Performed by: PHYSICIAN ASSISTANT

## 2019-10-15 PROCEDURE — G8427 DOCREV CUR MEDS BY ELIG CLIN: HCPCS | Performed by: PHYSICIAN ASSISTANT

## 2019-10-15 PROCEDURE — G8417 CALC BMI ABV UP PARAM F/U: HCPCS | Performed by: PHYSICIAN ASSISTANT

## 2019-10-15 PROCEDURE — 4004F PT TOBACCO SCREEN RCVD TLK: CPT | Performed by: PHYSICIAN ASSISTANT

## 2019-10-15 RX ORDER — ACETAMINOPHEN 160 MG
2000 TABLET,DISINTEGRATING ORAL DAILY
Qty: 90 CAPSULE | Refills: 1 | Status: SHIPPED | OUTPATIENT
Start: 2019-10-15 | End: 2019-11-08 | Stop reason: SDUPTHER

## 2019-10-15 RX ORDER — AMITRIPTYLINE HYDROCHLORIDE 50 MG/1
50 TABLET, FILM COATED ORAL NIGHTLY
Qty: 90 TABLET | Refills: 1 | Status: SHIPPED | OUTPATIENT
Start: 2019-10-15 | End: 2019-11-08 | Stop reason: SDUPTHER

## 2019-10-15 ASSESSMENT — ENCOUNTER SYMPTOMS
SWOLLEN GLANDS: 0
COUGH: 0
PHOTOPHOBIA: 1
VISUAL CHANGE: 0
ABDOMINAL PAIN: 0
EYE DISCHARGE: 0
NAUSEA: 0
EYE ITCHING: 0
SORE THROAT: 0
SHORTNESS OF BREATH: 0
SCALP TENDERNESS: 0
CHEST TIGHTNESS: 0
DIARRHEA: 0
SINUS PRESSURE: 0
VOMITING: 0
RHINORRHEA: 0

## 2019-11-08 ENCOUNTER — OFFICE VISIT (OUTPATIENT)
Dept: NEUROLOGY | Age: 34
End: 2019-11-08
Payer: COMMERCIAL

## 2019-11-08 VITALS
DIASTOLIC BLOOD PRESSURE: 68 MMHG | HEART RATE: 77 BPM | BODY MASS INDEX: 24.88 KG/M2 | WEIGHT: 154.8 LBS | HEIGHT: 66 IN | SYSTOLIC BLOOD PRESSURE: 121 MMHG

## 2019-11-08 DIAGNOSIS — R20.2 PARESTHESIA: ICD-10-CM

## 2019-11-08 DIAGNOSIS — R51.9 HEADACHE DISORDER: Primary | ICD-10-CM

## 2019-11-08 DIAGNOSIS — R15.9 INCONTINENCE OF FECES, UNSPECIFIED FECAL INCONTINENCE TYPE: ICD-10-CM

## 2019-11-08 PROCEDURE — G8420 CALC BMI NORM PARAMETERS: HCPCS | Performed by: PSYCHIATRY & NEUROLOGY

## 2019-11-08 PROCEDURE — 99214 OFFICE O/P EST MOD 30 MIN: CPT | Performed by: PSYCHIATRY & NEUROLOGY

## 2019-11-08 PROCEDURE — 4004F PT TOBACCO SCREEN RCVD TLK: CPT | Performed by: PSYCHIATRY & NEUROLOGY

## 2019-11-08 PROCEDURE — G8484 FLU IMMUNIZE NO ADMIN: HCPCS | Performed by: PSYCHIATRY & NEUROLOGY

## 2019-11-08 PROCEDURE — G8427 DOCREV CUR MEDS BY ELIG CLIN: HCPCS | Performed by: PSYCHIATRY & NEUROLOGY

## 2019-11-08 RX ORDER — TOPIRAMATE 25 MG/1
TABLET ORAL
Qty: 120 TABLET | Refills: 3 | Status: SHIPPED | OUTPATIENT
Start: 2019-11-08 | End: 2020-04-21 | Stop reason: DRUGHIGH

## 2019-11-08 RX ORDER — AMITRIPTYLINE HYDROCHLORIDE 50 MG/1
50 TABLET, FILM COATED ORAL NIGHTLY
COMMUNITY
End: 2019-12-17

## 2019-11-08 RX ORDER — BUTALBITAL, ACETAMINOPHEN AND CAFFEINE 50; 325; 40 MG/1; MG/1; MG/1
1 TABLET ORAL EVERY 4 HOURS PRN
COMMUNITY
End: 2019-12-17

## 2019-12-17 ENCOUNTER — OFFICE VISIT (OUTPATIENT)
Dept: FAMILY MEDICINE CLINIC | Age: 34
End: 2019-12-17
Payer: COMMERCIAL

## 2019-12-17 VITALS
HEART RATE: 67 BPM | HEIGHT: 66 IN | TEMPERATURE: 97.3 F | WEIGHT: 151 LBS | SYSTOLIC BLOOD PRESSURE: 106 MMHG | BODY MASS INDEX: 24.27 KG/M2 | OXYGEN SATURATION: 99 % | DIASTOLIC BLOOD PRESSURE: 68 MMHG

## 2019-12-17 DIAGNOSIS — R79.89 ABNORMAL LFTS: Primary | ICD-10-CM

## 2019-12-17 DIAGNOSIS — R11.2 NAUSEA AND VOMITING, INTRACTABILITY OF VOMITING NOT SPECIFIED, UNSPECIFIED VOMITING TYPE: ICD-10-CM

## 2019-12-17 PROCEDURE — G8420 CALC BMI NORM PARAMETERS: HCPCS | Performed by: PHYSICIAN ASSISTANT

## 2019-12-17 PROCEDURE — G8484 FLU IMMUNIZE NO ADMIN: HCPCS | Performed by: PHYSICIAN ASSISTANT

## 2019-12-17 PROCEDURE — G8427 DOCREV CUR MEDS BY ELIG CLIN: HCPCS | Performed by: PHYSICIAN ASSISTANT

## 2019-12-17 PROCEDURE — 4004F PT TOBACCO SCREEN RCVD TLK: CPT | Performed by: PHYSICIAN ASSISTANT

## 2019-12-17 PROCEDURE — 99213 OFFICE O/P EST LOW 20 MIN: CPT | Performed by: PHYSICIAN ASSISTANT

## 2019-12-17 RX ORDER — FAMOTIDINE 40 MG/1
40 TABLET, FILM COATED ORAL EVERY EVENING
Qty: 30 TABLET | Refills: 3 | Status: SHIPPED | OUTPATIENT
Start: 2019-12-17 | End: 2021-07-26

## 2019-12-17 ASSESSMENT — ENCOUNTER SYMPTOMS
PHOTOPHOBIA: 1
SHORTNESS OF BREATH: 0
NAUSEA: 0
CHEST TIGHTNESS: 0
DIARRHEA: 0
VISUAL CHANGE: 0
SCALP TENDERNESS: 0
COUGH: 0
VOMITING: 0
EYE DISCHARGE: 0
EYE ITCHING: 0
RHINORRHEA: 0
ABDOMINAL PAIN: 0
SORE THROAT: 0
SINUS PRESSURE: 0
SWOLLEN GLANDS: 0

## 2019-12-20 ASSESSMENT — ENCOUNTER SYMPTOMS
BLURRED VISION: 0
BACK PAIN: 0

## 2020-01-17 ENCOUNTER — OFFICE VISIT (OUTPATIENT)
Dept: NEUROLOGY | Age: 35
End: 2020-01-17
Payer: COMMERCIAL

## 2020-01-17 VITALS
SYSTOLIC BLOOD PRESSURE: 116 MMHG | WEIGHT: 146.8 LBS | DIASTOLIC BLOOD PRESSURE: 52 MMHG | BODY MASS INDEX: 23.59 KG/M2 | HEART RATE: 75 BPM | HEIGHT: 66 IN

## 2020-01-17 PROCEDURE — G8484 FLU IMMUNIZE NO ADMIN: HCPCS | Performed by: PSYCHIATRY & NEUROLOGY

## 2020-01-17 PROCEDURE — G8420 CALC BMI NORM PARAMETERS: HCPCS | Performed by: PSYCHIATRY & NEUROLOGY

## 2020-01-17 PROCEDURE — 99214 OFFICE O/P EST MOD 30 MIN: CPT | Performed by: PSYCHIATRY & NEUROLOGY

## 2020-01-17 PROCEDURE — G8427 DOCREV CUR MEDS BY ELIG CLIN: HCPCS | Performed by: PSYCHIATRY & NEUROLOGY

## 2020-01-17 PROCEDURE — 4004F PT TOBACCO SCREEN RCVD TLK: CPT | Performed by: PSYCHIATRY & NEUROLOGY

## 2020-01-17 NOTE — PROGRESS NOTES
[facility]            [facility], [address]; [place], [address]    [address] [facility], [address], [place], [address] [facility]            Dept: [phone]          Dept Fax: [phone]             MD Sixto Barreto MD Ahmed B. Florian Organ, MD Delfin Norfolk, MD Legrand Gauss, MD Glorietta Sells, CNP               NEUROLOGY FOLLOW UP NOTE                                          PATIENT NAME: Nelly Landin   PATIENT MRN: [id number]  FOLLOW UP TODAY: 1/17/2020     Dear Dr. Justen August, PARajwinderC,     I had the pleasure of seeing your patient Nelly Landin, who comes for follow up. CHIEF COMPLAINT: Follow-up and Headache     INITIAL & INTERVAL HISTORY:     Nelly Landin is a 27year old RH WF, last seen on 11/08/2019, pt returns for follow up regarding her HA. Last time, started her on topamax at 25mg daily with slow titration, currently at 50mg BID; also on vitamin B2, she has stopped using fioricet. Since last visit, she has had no migraines, she has lost 8 lbs, she does not want to lose weight, mood is fine, sleep 6-8 hours per night. For the past one month, she has had URI, children all have been sick but no migraine HA. She still has tingling/numbness in both hands, particularly early in the morning, more frequent than before. She does not think there is any correlation with topamax use. No urinary incontinence. EMG/NCV is still pending.       Initial clinical visit on 9/20/2019  HA  Onset: at age of 12or 11years old, headache free from [age], restarted HA one month ago  Aura/warning: seeing white light, color in between also visual filed narrowed from one side,   Features: starts from temporal or back of head, pressure, pounding, 8-9/10, photophobia/phonophobia, nauseated, occasional vomiting like this morning, HA usual lasted 1-4 hours, but the last one lasted for two weeks. Can be associated with weather change, poor sleep; not related to food, mood; when FELIZ came, she had to rest.   She also reported \"regular headache\" which is located in bifrontal, no photophobia/phonobia, 4-5/10  Risk factors: mother has HA. She slipped once on ice hurt head, not sure about LOC,   Social: single, lives with family, has two children of 9 and 2 year ago, 0.5ppd smoking, social drinking once a month, no drugs, sleep 5-7 hours per night. 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 Last seen therapist 2 years ago. Other medical issues: spinal stenosis, fibromyalgia, arthritis, since Jan, has lost 30-40 lbs, unintentional. Appetite not great. Bowel and urination not regular. She even had one time fecal incontinence (last week)  Medications: Started elavil 25mg since two days ago, makes her very sleep; gabapentin on and off for 7 years for back pain but stopped for 3 months; on firoricet for the past three weeks (uses 3-4 a day in the first two weeks, this week took 1); Never tried topamax,   Previous neurologist: Dr. Edda Dutta at [facility] (last seen in 11/2018)   Spinal stenosis? Pending imaging, no lumbar MRI   Both arm/leg numbness and tingling, more on left, sometime feel one side weak. Fecal incontinence  Occurred once, last week      NEUROLOGICAL WORKUP:   MRI brain 9/27/2019  No acute intracranial abnormality.   Mild nonspecific atrophy involving the superior aspect of the cerebellum of  uncertain etiology.          MRI cervical 9/27/2019  Disc osteophyte complex eccentric to the left at the C5-6 and C6-7 levels  without significant canal stenosis or foraminal narrowing.          MRI lumbar 9/27/2019  Mild spondylotic changes in the lumbar spine without significant spinal canal  stenosis or significant neural foraminal narrowing.       MRI brain 10/2014 [facility]  Normal      MRI cervical 10/2014 [facility]  Posterior degenerative change at C5-C6 and C6-C7 with mild stenosis of the central spinal canal but no cord compression. No additional abnormalities.     PMH/PSH/SH/FMH: Remain unchanged since last visit except those listed in the interval history    Hospital Outpatient Visit on 09/27/2019   Component Date Value Ref Range Status    WBC 09/27/2019 7.3  3.5 - 11.3 k/uL Final    RBC 09/27/2019 5.22* 3.95 - 5.11 m/uL Final    Hemoglobin 09/27/2019 14.2  11.9 - 15.1 g/dL Final    Hematocrit 09/27/2019 46.9  36.3 - 47.1 % Final    MCV 09/27/2019 89.8  82.6 - 102.9 fL Final    MCH 09/27/2019 27.2  25.2 - 33.5 pg Final    MCHC 09/27/2019 30.3  28.4 - 34.8 g/dL Final    RDW 09/27/2019 12.9  11.8 - 14.4 % Final    Platelets 62/42/1454 282  138 - 453 k/uL Final    MPV 09/27/2019 10.5  8.1 - 13.5 fL Final    NRBC Automated 09/27/2019 0.0  0.0 per 100 WBC Final    Differential Type 09/27/2019 NOT REPORTED   Final    Seg Neutrophils 09/27/2019 61  36 - 65 % Final    Lymphocytes 09/27/2019 29  24 - 43 % Final    Monocytes 09/27/2019 7  3 - 12 % Final    Eosinophils % 09/27/2019 2  1 - 4 % Final    Basophils 09/27/2019 1  0 - 2 % Final    Immature Granulocytes 09/27/2019 0  0 % Final    Segs Absolute 09/27/2019 4.37  1.50 - 8.10 k/uL Final    Absolute Lymph # 09/27/2019 2.13  1.10 - 3.70 k/uL Final    Absolute Mono # 09/27/2019 0.53  0.10 - 1.20 k/uL Final    Absolute Eos # 09/27/2019 0.15  0.00 - 0.44 k/uL Final    Basophils Absolute 09/27/2019 0.05  0.00 - 0.20 k/uL Final    Absolute Immature Granulocyte 09/27/2019 <0.03  0.00 - 0.30 k/uL Final    WBC Morphology 09/27/2019 NOT REPORTED   Final    RBC Morphology 09/27/2019 NOT REPORTED   Final    Platelet Estimate 01/21/6700 NOT REPORTED   Final    Glucose 09/27/2019 77  70 - 99 mg/dL Final    BUN 09/27/2019 6  6 - 20 mg/dL Final    CREATININE 09/27/2019 0.68  0.50 - 0.90 mg/dL Final   

## 2020-01-17 NOTE — PATIENT INSTRUCTIONS
1. Decrease topamax as following   50mg morning, 25mg evening  2. Drink a lot of water, continue vitamin B2  3. Keep a log regarding, headache,  also correlation of tingling/numbness with topamax dose change  4.  Get EMG/NCV done    Return in 3 months    Akin Pimentel MD, MS

## 2020-02-25 ENCOUNTER — OFFICE VISIT (OUTPATIENT)
Dept: FAMILY MEDICINE CLINIC | Age: 35
End: 2020-02-25
Payer: COMMERCIAL

## 2020-02-25 VITALS
HEART RATE: 72 BPM | DIASTOLIC BLOOD PRESSURE: 57 MMHG | WEIGHT: 144.2 LBS | RESPIRATION RATE: 16 BRPM | TEMPERATURE: 98.1 F | HEIGHT: 66 IN | BODY MASS INDEX: 23.18 KG/M2 | SYSTOLIC BLOOD PRESSURE: 107 MMHG

## 2020-02-25 PROCEDURE — G8484 FLU IMMUNIZE NO ADMIN: HCPCS | Performed by: PHYSICIAN ASSISTANT

## 2020-02-25 PROCEDURE — G8420 CALC BMI NORM PARAMETERS: HCPCS | Performed by: PHYSICIAN ASSISTANT

## 2020-02-25 PROCEDURE — 99213 OFFICE O/P EST LOW 20 MIN: CPT | Performed by: PHYSICIAN ASSISTANT

## 2020-02-25 PROCEDURE — 4004F PT TOBACCO SCREEN RCVD TLK: CPT | Performed by: PHYSICIAN ASSISTANT

## 2020-02-25 PROCEDURE — G8427 DOCREV CUR MEDS BY ELIG CLIN: HCPCS | Performed by: PHYSICIAN ASSISTANT

## 2020-02-25 RX ORDER — PREDNISONE 20 MG/1
20 TABLET ORAL 2 TIMES DAILY
Qty: 10 TABLET | Refills: 0 | Status: SHIPPED | OUTPATIENT
Start: 2020-02-25 | End: 2020-04-23 | Stop reason: SDUPTHER

## 2020-02-25 RX ORDER — CYCLOBENZAPRINE HCL 10 MG
10 TABLET ORAL 3 TIMES DAILY PRN
Qty: 21 TABLET | Refills: 0 | Status: SHIPPED | OUTPATIENT
Start: 2020-02-25 | End: 2020-03-06

## 2020-02-25 RX ORDER — DEXAMETHASONE SODIUM PHOSPHATE 100 MG/10ML
10 INJECTION INTRAMUSCULAR; INTRAVENOUS ONCE
Status: COMPLETED | OUTPATIENT
Start: 2020-02-25 | End: 2020-02-25

## 2020-02-25 RX ADMIN — DEXAMETHASONE SODIUM PHOSPHATE 10 MG: 100 INJECTION INTRAMUSCULAR; INTRAVENOUS at 13:15

## 2020-02-25 ASSESSMENT — ENCOUNTER SYMPTOMS
COUGH: 0
CHEST TIGHTNESS: 0
SHORTNESS OF BREATH: 0
BACK PAIN: 1
WHEEZING: 0

## 2020-02-25 NOTE — PROGRESS NOTES
[address] PRIMARY CARE  [address] [facility] [address]  Dept: [phone]  Dept Fax: [phone]    Office Progress Note  Date of patient's visit: 2/25/2020  Patient's Name:  Naveed Jeronimo YOB: 1985            ISMA AMANDA PA  ================================================================    REASON FOR VISIT/CHIEF COMPLAINT:  Shoulder Pain (left, with limited movement )    HISTORY OF PRESENTING ILLNESS:  History was obtained from: patient. Naveed Jeronimo is a 27 y.o. female who presents with c/o left shoulder pain. Patient states approximately a week and a half ago she noticed some left shoulder pain without injury or fall. She states that the pain is worse underneath the shoulder blade. The day after she noticed the pain she states that she was moving her shoulder around in all directions and felt a pop and symptoms resolved for approximately 2 days. Patient then woke up with limited range of motion of the left shoulder. Patient has had no fevers, chills, headaches, neurologic deficits. She states that the pain is worse with movement and she is unable to externally rotate the shoulder due to the discomfort. Patient has no history of shoulder injuries.       Patient Active Problem List   Diagnosis    Anxiety    Abdominal pain    Nasal congestion    Post partum depression    HPV (human papilloma virus) anogenital infection    Mycoplasma infection    Psychophysiological insomnia    Fatigue    Chronic low back pain with bilateral sciatica    Spinal stenosis    Body mass index (BMI) of 25.0 to 29.9    Hemangioma of liver    Chronic bilateral low back pain with bilateral sciatica    Numbness and tingling    Numbness and tingling of both lower extremities    Fibromyalgia    Osteoarthritis    Major depressive disorder, recurrent, moderate (HCC)    Hypokalemia    Lumbar radicular pain    Tobacco abuse       Health Maintenance Due   Topic Date Due    Cervical cancer screen  05/26/2018       Allergies   Allergen Reactions    Latex Other (See Comments)     Redness and swelling    Banana Anaphylaxis     Lips swell    Banana Extract Allergy Skin Test Anaphylaxis    Avocado     Coconut Fatty Acids     Coconut Oil     Kiwi Extract     Garret Flavor [Flavoring Agent]     Papaya Derivatives     Tape [Adhesive Tape] Other (See Comments)     Plastic tape causes skin irritation    Maxalt [Rizatriptan Benzoate] Nausea And Vomiting         Current Outpatient Medications   Medication Sig Dispense Refill    cyclobenzaprine (FLEXERIL) 10 MG tablet Take 1 tablet by mouth 3 times daily as needed for Muscle spasms 21 tablet 0    predniSONE (DELTASONE) 20 MG tablet Take 1 tablet by mouth 2 times daily for 5 days 10 tablet 0    famotidine (PEPCID) 40 MG tablet Take 1 tablet by mouth every evening 30 tablet 3    Cholecalciferol (VITAMIN D3 PO) Take 2,000 Units by mouth daily      topiramate (TOPAMAX) 25 MG tablet 25mg daily for 1wk, 25mg BID for 1wk, 50mg AM, 25mg PM for 1wk, then 50mg BID (Patient taking differently: 50 mg 2 times daily 25mg daily for 1wk, 25mg BID for 1wk, 50mg AM, 25mg PM for 1wk, then 50mg BID) 120 tablet 3    vitamin B-2 (RIBOFLAVIN) 100 MG TABS tablet Take 1 tablet by mouth daily 30 tablet 3     No current facility-administered medications for this visit. Social History     Tobacco Use    Smoking status: Current Every Day Smoker     Packs/day: 0.50     Years: 11.00     Pack years: 5.50     Types: Cigarettes    Smokeless tobacco: Never Used    Tobacco comment: off and on 11 years   Substance Use Topics    Alcohol use:  Yes     Alcohol/week: 0.0 standard drinks     Frequency: Monthly or less     Comment: socially    Drug use: No       Family History   Problem Relation Age of Onset    Diabetes Father     High Blood Pressure Father     High Cholesterol Father     High Blood Pressure Paternal No midline bony tenderness over the cervical thoracic or lumbar sacral spine.  strength is 5 out of 5 bilaterally and normal distal sensation in the upper extremities with 2+ radial pulses palpable there is no bruising, warmth or redness to the joints. Skin:     General: Skin is warm and dry. Neurological:      General: No focal deficit present. Mental Status: She is alert and oriented to person, place, and time. Cranial Nerves: No cranial nerve deficit. Sensory: No sensory deficit. Motor: No weakness. Coordination: Coordination normal.      Gait: Gait normal.      Deep Tendon Reflexes: Reflexes normal.   Psychiatric:         Mood and Affect: Mood normal.         Behavior: Behavior normal. Behavior is cooperative. Thought Content: Thought content normal.         Judgment: Judgment normal.         Vitals:    02/25/20 1147   BP: (!) 107/57   Site: Right Upper Arm   Position: Sitting   Cuff Size: Medium Adult   Pulse: 72   Resp: 16   Temp: 98.1 °F (36.7 °C)   TempSrc: Oral   Weight: 144 lb 3.2 oz (65.4 kg)   Height: 5' 5.98\" (1.676 m)     BP Readings from Last 3 Encounters:   02/25/20 (!) 107/57   01/17/20 (!) 116/52   12/17/19 106/68              DIAGNOSTIC FINDINGS:  CBC:  Lab Results   Component Value Date    WBC 7.3 09/27/2019    HGB 14.2 09/27/2019     09/27/2019       BMP:    Lab Results   Component Value Date     09/27/2019    K 4.1 09/27/2019     09/27/2019    CO2 24 09/27/2019    BUN 6 09/27/2019    CREATININE 0.68 09/27/2019    GLUCOSE 77 09/27/2019    GLUCOSE 84 07/11/2019         FASTING LIPID PANEL:  Lab Results   Component Value Date    CHOL 173 09/27/2019    HDL 45 09/27/2019    TRIG 58 09/27/2019       No results found for this visit on 02/25/20. ASSESSMENT AND PLAN:   Diagnosis Orders   1.  Acute pain of left shoulder  cyclobenzaprine (FLEXERIL) 10 MG tablet    XR CERVICAL SPINE (2-3 VIEWS)    XR SHOULDER LEFT (MIN 2 VIEWS)    predniSONE

## 2020-03-13 ENCOUNTER — OFFICE VISIT (OUTPATIENT)
Dept: FAMILY MEDICINE CLINIC | Age: 35
End: 2020-03-13
Payer: COMMERCIAL

## 2020-03-13 VITALS
SYSTOLIC BLOOD PRESSURE: 112 MMHG | DIASTOLIC BLOOD PRESSURE: 70 MMHG | TEMPERATURE: 98 F | OXYGEN SATURATION: 99 % | HEART RATE: 67 BPM

## 2020-03-13 LAB
INFLUENZA A ANTIBODY: NORMAL
INFLUENZA B ANTIBODY: NORMAL

## 2020-03-13 PROCEDURE — G8427 DOCREV CUR MEDS BY ELIG CLIN: HCPCS | Performed by: PHYSICIAN ASSISTANT

## 2020-03-13 PROCEDURE — 87804 INFLUENZA ASSAY W/OPTIC: CPT | Performed by: PHYSICIAN ASSISTANT

## 2020-03-13 PROCEDURE — G8420 CALC BMI NORM PARAMETERS: HCPCS | Performed by: PHYSICIAN ASSISTANT

## 2020-03-13 PROCEDURE — 4004F PT TOBACCO SCREEN RCVD TLK: CPT | Performed by: PHYSICIAN ASSISTANT

## 2020-03-13 PROCEDURE — G8484 FLU IMMUNIZE NO ADMIN: HCPCS | Performed by: PHYSICIAN ASSISTANT

## 2020-03-13 PROCEDURE — 99213 OFFICE O/P EST LOW 20 MIN: CPT | Performed by: PHYSICIAN ASSISTANT

## 2020-03-13 RX ORDER — ONDANSETRON 4 MG/1
4 TABLET, ORALLY DISINTEGRATING ORAL
Qty: 30 TABLET | Refills: 0 | Status: SHIPPED | OUTPATIENT
Start: 2020-03-13 | End: 2020-04-22

## 2020-03-13 RX ORDER — LOPERAMIDE HYDROCHLORIDE 2 MG/1
2 CAPSULE ORAL 4 TIMES DAILY PRN
Qty: 40 CAPSULE | Refills: 0 | Status: SHIPPED | OUTPATIENT
Start: 2020-03-13 | End: 2020-03-23

## 2020-03-13 ASSESSMENT — ENCOUNTER SYMPTOMS
VISUAL CHANGE: 0
BLOOD IN STOOL: 0
ANAL BLEEDING: 0
BLOATING: 0
SORE THROAT: 0
CHANGE IN BOWEL HABIT: 0
CONSTIPATION: 0
ABDOMINAL DISTENTION: 0
SWOLLEN GLANDS: 0
NAUSEA: 1
VOMITING: 1
RECTAL PAIN: 0
ABDOMINAL PAIN: 0
FLATUS: 0
DIARRHEA: 1
COUGH: 0
EYES NEGATIVE: 1

## 2020-03-13 NOTE — PROGRESS NOTES
[address] [facility]  [address] [facility] [address]  Phone: [phone]  Fax: [phone] [address] [facility] Name: Danny Gregg  MRN: [id number]  [place] 1985  Date of evaluation: 3/13/2020  Provider: Fernando Terrell PA-C     CHIEF COMPLAINT       Chief Complaint   Patient presents with    Emesis     hasnt been able to keep anything down since monday - today cant even keep water down     Diarrhea           HISTORY OF PRESENT ILLNESS  (Location/Symptom, Timing/Onset, Context/Setting, Quality, Duration, Modifying Factors, Severity.)   Danny Gregg is a 27 y.o. White [1] female who presents to the office for evaluation of      Nausea & Vomiting   This is a new problem. The current episode started in the past 7 days. Associated symptoms include nausea and vomiting. Pertinent negatives include no abdominal pain, anorexia, arthralgias, change in bowel habit, chest pain, chills, congestion, coughing, diaphoresis, fatigue, fever, headaches, joint swelling, myalgias, neck pain, numbness, rash, sore throat, swollen glands, urinary symptoms, vertigo, visual change or weakness. Nothing aggravates the symptoms. She has tried nothing for the symptoms. Diarrhea    This is a new problem. The current episode started in the past 7 days. The problem occurs 5 to 10 times per day. The problem has been unchanged. The stool consistency is described as watery. The patient states that diarrhea awakens her from sleep. Associated symptoms include vomiting. Pertinent negatives include no abdominal pain, arthralgias, bloating, chills, coughing, fever, headaches, increased  flatus, myalgias, sweats, URI or weight loss. Nothing aggravates the symptoms. There are no known risk factors. She has tried nothing for the symptoms. Nursing Notes were reviewed.     REVIEW OF SYSTEMS    (2-9 systems for level 4, 10 or more for level 5)     Review of Systems   Constitutional: Negative for chills, diaphoresis, fatigue, fever and weight loss. HENT: Negative for congestion and sore throat. Eyes: Negative. Respiratory: Negative for cough. Cardiovascular: Negative for chest pain. Gastrointestinal: Positive for diarrhea, nausea and vomiting. Negative for abdominal distention, abdominal pain, anal bleeding, anorexia, bloating, blood in stool, change in bowel habit, constipation, flatus and rectal pain. Genitourinary: Negative. Musculoskeletal: Negative for arthralgias, joint swelling, myalgias and neck pain. Skin: Negative for rash. Neurological: Negative for vertigo, weakness, numbness and headaches. Except as noted above the remainder of the review of systems was reviewed andnegative. PAST MEDICAL HISTORY   History reviewed. Past Medical History:   Diagnosis Date    Acid reflux     Anxiety     Fibromyalgia     Headache     migraines    HPV (human papilloma virus) anogenital infection     Insomnia     Movement disorder     spasms, B&B    Neuropathy     left hands, feet    Rheumatic fever     Spinal stenosis     Wears glasses          SURGICAL HISTORY     History reviewed.     Past Surgical History:   Procedure Laterality Date    CHOLECYSTECTOMY      DENTAL SURGERY      removed some teeth at age 1   [facility] TONSILLECTOMY AND ADENOIDECTOMY Bilateral 1/7/2016         CURRENT MEDICATIONS       Current Outpatient Medications   Medication Sig Dispense Refill    ondansetron (ZOFRAN ODT) 4 MG disintegrating tablet Take 1 tablet by mouth every 6-8 hours as needed for Nausea or Vomiting 30 tablet 0    loperamide (RA ANTI-DIARRHEAL) 2 MG capsule Take 1 capsule by mouth 4 times daily as needed for Diarrhea 40 capsule 0    famotidine (PEPCID) 40 MG tablet Take 1 tablet by mouth every evening 30 tablet 3    Cholecalciferol (VITAMIN D3 PO) Take 2,000 Units by mouth daily      topiramate (TOPAMAX) 25 MG tablet 25mg daily for 1wk, 25mg BID for 1wk, 50mg AM, 25mg PM for 1wk, then 50mg BID (Patient taking differently: 50 mg 2 times daily 25mg daily for 1wk, 25mg BID for 1wk, 50mg AM, 25mg PM for 1wk, then 50mg BID) 120 tablet 3    vitamin B-2 (RIBOFLAVIN) 100 MG TABS tablet Take 1 tablet by mouth daily 30 tablet 3     No current facility-administered medications for this visit. ALLERGIES     Latex; Banana; Banana extract allergy skin test; Avocado; Coconut fatty acids; Coconut oil; Kiwi extract; Garret flavor [flavoring agent]; Papaya derivatives; Tape Parikh Toshia tape]; and Maxalt [rizatriptan benzoate]    FAMILY HISTORY           Problem Relation Age of Onset    Diabetes Father     High Blood Pressure Father     High Cholesterol Father     High Blood Pressure Paternal Grandmother     Cancer Paternal Grandmother     Breast Cancer Paternal Grandmother      Family Status   Relation Name Status    Mother  Alive        @56, healthy    Father  Alive        @59, HTN, DM ,     PGM  (Not Specified)          SOCIAL HISTORY      reports that she has been smoking cigarettes. She has a 5.50 pack-year smoking history. She has never used smokeless tobacco. She reports current alcohol use. She reports that she does not use drugs. PHYSICAL EXAM    (up to 7 for level 4, 8 or more for level 5)     Vitals:    03/13/20 1159   BP: 112/70   Pulse: 67   Temp: 98 °F (36.7 °C)   SpO2: 99%         Physical Exam  Vitals signs and nursing note reviewed. Constitutional:       General: She is not in acute distress. Appearance: Normal appearance. She is not ill-appearing, toxic-appearing or diaphoretic. HENT:      Head: Normocephalic and atraumatic. Right Ear: External ear normal.      Left Ear: External ear normal.      Nose: Nose normal.      Mouth/Throat:      Mouth: Mucous membranes are moist.   Eyes:      General: No scleral icterus. Right eye: No discharge. Left eye: No discharge. Extraocular Movements: Extraocular movements intact. Conjunctiva/sclera: Conjunctivae normal.      Pupils: Pupils are equal, round, and reactive to light. Cardiovascular:      Rate and Rhythm: Normal rate and regular rhythm. Pulses: Normal pulses. Heart sounds: Normal heart sounds. Pulmonary:      Effort: Pulmonary effort is normal.      Breath sounds: Normal breath sounds. Abdominal:      Tenderness: There is generalized abdominal tenderness. Skin:     General: Skin is warm and dry. Neurological:      Mental Status: She is alert and oriented to person, place, and time. DIFFERENTIAL DIAGNOSIS:     Jeannie Duncan reviewed the disposition diagnosis with the patient and or their family/guardian. I have answered their questions and given discharge instructions. They voiced understanding of these instructions and did not have anyfurther questions or complaints. PROCEDURES:  Orders Placed This Encounter   Procedures    POCT Influenza A/B       No results found for this visit on 03/13/20. FINALIMPRESSION      Visit Diagnoses and Associated Orders     Diarrhea, unspecified type    -  Primary    POCT Influenza A/B [71648 Custom]           Viral gastroenteritis             ORDERS WITHOUT AN ASSOCIATED DIAGNOSIS    ondansetron (ZOFRAN ODT) 4 MG disintegrating tablet [04594]      loperamide (RA ANTI-DIARRHEAL) 2 MG capsule [4560]              PLAN     Return if symptoms worsen or fail to improve. DISCHARGEMEDICATIONS:  Orders Placed This Encounter   Medications    ondansetron (ZOFRAN ODT) 4 MG disintegrating tablet     Sig: Take 1 tablet by mouth every 6-8 hours as needed for Nausea or Vomiting     Dispense:  30 tablet     Refill:  0    loperamide (RA ANTI-DIARRHEAL) 2 MG capsule     Sig: Take 1 capsule by mouth 4 times daily as needed for Diarrhea     Dispense:  40 capsule     Refill:  0         Plan:  Based on the clinical exam findings and patient's reported symptoms, I do not suspect acute abdomen at this time.   I believe that this is

## 2020-03-13 NOTE — PATIENT INSTRUCTIONS
Patient Education        Gastroenteritis: Care Instructions  Your Care Instructions    Gastroenteritis is an illness that may cause nausea, vomiting, and diarrhea. It is sometimes called \"stomach flu. \" It can be caused by bacteria or a virus. You will probably begin to feel better in 1 to 2 days. In the meantime, get plenty of rest and make sure you do not become dehydrated. Dehydration occurs when your body loses too much fluid. Follow-up care is a key part of your treatment and safety. Be sure to make and go to all appointments, and call your doctor if you are having problems. It's also a good idea to know your test results and keep a list of the medicines you take. How can you care for yourself at home? · If your doctor prescribed antibiotics, take them as directed. Do not stop taking them just because you feel better. You need to take the full course of antibiotics. · Drink plenty of fluids to prevent dehydration, enough so that your urine is light yellow or clear like water. Choose water and other caffeine-free clear liquids until you feel better. If you have kidney, heart, or liver disease and have to limit fluids, talk with your doctor before you increase your fluid intake. · Drink fluids slowly, in frequent, small amounts, because drinking too much too fast can cause vomiting. · Begin eating mild foods, such as dry toast, yogurt, applesauce, bananas, and rice. Avoid spicy, hot, or high-fat foods, and do not drink alcohol or caffeine for a day or two. Do not drink milk or eat ice cream until you are feeling better. How to prevent gastroenteritis  · Keep hot foods hot and cold foods cold. · Do not eat meats, dressings, salads, or other foods that have been kept at room temperature for more than 2 hours. · Use a thermometer to check your refrigerator. It should be between 34°F and 40°F.  · Defrost meats in the refrigerator or microwave, not on the kitchen counter.   · Keep your hands and your kitchen diarrhea and for 3 days after symptoms are gone. · The doctor may recommend that you take over-the-counter medicine, such as loperamide (Imodium), if you still have diarrhea after 6 hours. Read and follow all instructions on the label. Do not use this medicine if you have bloody diarrhea, a high fever, or other signs of serious illness. Call your doctor if you think you are having a problem with your medicine. When should you call for help? Call 911 anytime you think you may need emergency care. For example, call if:    · You passed out (lost consciousness).     · Your stools are maroon or very bloody.    Call your doctor now or seek immediate medical care if:    · You are dizzy or lightheaded, or you feel like you may faint.     · Your stools are black and look like tar, or they have streaks of blood.     · You have new or worse belly pain.     · You have symptoms of dehydration, such as:  ? Dry eyes and a dry mouth. ? Passing only a little dark urine. ? Feeling thirstier than usual.     · You have a new or higher fever.    Watch closely for changes in your health, and be sure to contact your doctor if:    · Your diarrhea is getting worse.     · You see pus in the diarrhea.     · You are not getting better after 2 days (48 hours). Where can you learn more? Go to https://Thingy ClubpeClosetbox.Aireon. org and sign in to your [employer] account. Enter R653 in the [employer] box to learn more about \"Diarrhea: Care Instructions. \"     If you do not have an account, please click on the \"Sign Up Now\" link. Current as of: June 26, 2019  Content Version: 12.3  © 5970-7290 [employer]. Care instructions adapted under license by [facility] ([facility]). If you have questions about a medical condition or this instruction, always ask your healthcare professional. [address] any warranty or liability for your use of this information.

## 2020-03-19 ENCOUNTER — OFFICE VISIT (OUTPATIENT)
Dept: PRIMARY CARE CLINIC | Age: 35
End: 2020-03-19
Payer: COMMERCIAL

## 2020-03-19 ENCOUNTER — TELEPHONE (OUTPATIENT)
Dept: FAMILY MEDICINE CLINIC | Age: 35
End: 2020-03-19

## 2020-03-19 VITALS
OXYGEN SATURATION: 100 % | DIASTOLIC BLOOD PRESSURE: 65 MMHG | WEIGHT: 146 LBS | HEART RATE: 68 BPM | BODY MASS INDEX: 23.46 KG/M2 | SYSTOLIC BLOOD PRESSURE: 108 MMHG | TEMPERATURE: 98.4 F | HEIGHT: 66 IN | RESPIRATION RATE: 16 BRPM

## 2020-03-19 PROCEDURE — 99214 OFFICE O/P EST MOD 30 MIN: CPT | Performed by: NURSE PRACTITIONER

## 2020-03-19 PROCEDURE — G8427 DOCREV CUR MEDS BY ELIG CLIN: HCPCS | Performed by: NURSE PRACTITIONER

## 2020-03-19 PROCEDURE — 4004F PT TOBACCO SCREEN RCVD TLK: CPT | Performed by: NURSE PRACTITIONER

## 2020-03-19 PROCEDURE — G8484 FLU IMMUNIZE NO ADMIN: HCPCS | Performed by: NURSE PRACTITIONER

## 2020-03-19 PROCEDURE — G8420 CALC BMI NORM PARAMETERS: HCPCS | Performed by: NURSE PRACTITIONER

## 2020-03-19 RX ORDER — DICYCLOMINE HYDROCHLORIDE 10 MG/1
10 CAPSULE ORAL 3 TIMES DAILY PRN
Qty: 30 CAPSULE | Refills: 0 | Status: SHIPPED | OUTPATIENT
Start: 2020-03-19 | End: 2020-04-22

## 2020-03-19 ASSESSMENT — ENCOUNTER SYMPTOMS
RHINORRHEA: 0
SHORTNESS OF BREATH: 0
VOMITING: 0
SORE THROAT: 0
EYE PAIN: 0
COUGH: 0
DIARRHEA: 1
ABDOMINAL DISTENTION: 0
ABDOMINAL PAIN: 1
CONSTIPATION: 1
NAUSEA: 0
CHEST TIGHTNESS: 0

## 2020-03-19 NOTE — PATIENT INSTRUCTIONS
Patient Education        Abdominal Pain: Care Instructions  Your Care Instructions    Abdominal pain has many possible causes. Some aren't serious and get better on their own in a few days. Others need more testing and treatment. If your pain continues or gets worse, you need to be rechecked and may need more tests to find out what is wrong. You may need surgery to correct the problem. Don't ignore new symptoms, such as fever, nausea and vomiting, urination problems, pain that gets worse, and dizziness. These may be signs of a more serious problem. Your doctor may have recommended a follow-up visit in the next 8 to 12 hours. If you are not getting better, you may need more tests or treatment. The doctor has checked you carefully, but problems can develop later. If you notice any problems or new symptoms, get medical treatment right away. Follow-up care is a key part of your treatment and safety. Be sure to make and go to all appointments, and call your doctor if you are having problems. It's also a good idea to know your test results and keep a list of the medicines you take. How can you care for yourself at home? · Rest until you feel better. · To prevent dehydration, drink plenty of fluids, enough so that your urine is light yellow or clear like water. Choose water and other caffeine-free clear liquids until you feel better. If you have kidney, heart, or liver disease and have to limit fluids, talk with your doctor before you increase the amount of fluids you drink. · If your stomach is upset, eat mild foods, such as rice, dry toast or crackers, bananas, and applesauce. Try eating several small meals instead of two or three large ones. · Wait until 48 hours after all symptoms have gone away before you have spicy foods, alcohol, and drinks that contain caffeine. · Do not eat foods that are high in fat. · Avoid anti-inflammatory medicines such as aspirin, ibuprofen (Advil, Motrin), and naproxen (Aleve). These can cause stomach upset. Talk to your doctor if you take daily aspirin for another health problem. When should you call for help? Call 911 anytime you think you may need emergency care. For example, call if:    · You passed out (lost consciousness).     · You pass maroon or very bloody stools.     · You vomit blood or what looks like coffee grounds.     · You have new, severe belly pain.    Call your doctor now or seek immediate medical care if:    · Your pain gets worse, especially if it becomes focused in one area of your belly.     · You have a new or higher fever.     · Your stools are black and look like tar, or they have streaks of blood.     · You have unexpected vaginal bleeding.     · You have symptoms of a urinary tract infection. These may include:  ? Pain when you urinate. ? Urinating more often than usual.  ? Blood in your urine.     · You are dizzy or lightheaded, or you feel like you may faint.    Watch closely for changes in your health, and be sure to contact your doctor if:    · You are not getting better after 1 day (24 hours). Where can you learn more? Go to https://Encore InteractivepeSYSTRAN.Drillster. org and sign in to your G[employer] account. Enter L700 in the [facility] box to learn more about \"Abdominal Pain: Care Instructions. \"     If you do not have an account, please click on the \"Sign Up Now\" link. Current as of: June 26, 2019Content Version: 12.4  © 4415-5809 [employer]. Care instructions adapted under license by [facility] ([facility]). If you have questions about a medical condition or this instruction, always ask your healthcare professional. [place] [address] any warranty or liability for your use of this information.

## 2020-03-19 NOTE — PROGRESS NOTES
Problem Relation Age of Onset    Diabetes Father     High Blood Pressure Father     High Cholesterol Father     High Blood Pressure Paternal Grandmother     Cancer Paternal Grandmother     Breast Cancer Paternal Grandmother        Social History     Tobacco Use    Smoking status: Current Every Day Smoker     Packs/day: 0.50     Years: 11.00     Pack years: 5.50     Types: Cigarettes    Smokeless tobacco: Never Used    Tobacco comment: off and on 11 years   Substance Use Topics    Alcohol use: Yes     Alcohol/week: 0.0 standard drinks     Frequency: Monthly or less     Comment: socially        Prior to Visit Medications    Medication Sig Taking?  Authorizing Provider   dicyclomine (BENTYL) 10 MG capsule Take 1 capsule by mouth 3 times daily as needed (cramping) Yes SATHISH Kelley CNP   ondansetron (ZOFRAN ODT) 4 MG disintegrating tablet Take 1 tablet by mouth every 6-8 hours as needed for Nausea or Vomiting Yes Tamera Jose PA-C   loperamide (RA ANTI-DIARRHEAL) 2 MG capsule Take 1 capsule by mouth 4 times daily as needed for Diarrhea Yes Tamera Jose PA-C   famotidine (PEPCID) 40 MG tablet Take 1 tablet by mouth every evening Yes Autumn Cody PA-C   Cholecalciferol (VITAMIN D3 PO) Take 2,000 Units by mouth daily Yes Beverly Luo MD   topiramate (TOPAMAX) 25 MG tablet 25mg daily for 1wk, 25mg BID for 1wk, 50mg AM, 25mg PM for 1wk, then 50mg BID  Patient taking differently: 50 mg 2 times daily 25mg daily for 1wk, 25mg BID for 1wk, 50mg AM, 25mg PM for 1wk, then 50mg BID Yes Beverly Luo MD   vitamin B-2 (RIBOFLAVIN) 100 MG TABS tablet Take 1 tablet by mouth daily Yes Beverly Luo MD       Allergies   Allergen Reactions    Latex Other (See Comments)     Redness and swelling    Banana Anaphylaxis     Lips swell    Banana Extract Allergy Skin Test Anaphylaxis    Avocado     Coconut Fatty Acids     Coconut Oil     Kiwi Extract     [place] Flavor [Flavoring Agent]     Papaya Derivatives     Tape Aspen Bury Tape] Other (See Comments)     Plastic tape causes skin irritation    Maxalt [Rizatriptan Benzoate] Nausea And Vomiting         Subjective:      Review of Systems   Constitutional: Positive for chills and fatigue. Negative for fever. HENT: Negative for congestion, ear pain, rhinorrhea and sore throat. Eyes: Negative for pain and visual disturbance. Respiratory: Negative for cough, chest tightness and shortness of breath. Cardiovascular: Negative for chest pain, palpitations and leg swelling. Gastrointestinal: Positive for abdominal pain, constipation and diarrhea. Negative for abdominal distention, nausea and vomiting. Genitourinary: Negative for decreased urine volume and difficulty urinating. Musculoskeletal: Negative for arthralgias, gait problem, myalgias and neck pain. Skin: Negative for pallor and rash. Neurological: Negative for weakness, light-headedness and headaches. Psychiatric/Behavioral: Negative for sleep disturbance. Objective:     Physical Exam  Vitals signs and nursing note reviewed. Constitutional:       General: She is not in acute distress. Appearance: Normal appearance. HENT:      Head: Normocephalic and atraumatic. Right Ear: Tympanic membrane and ear canal normal.      Left Ear: Tympanic membrane and ear canal normal.      Nose: Nose normal.      Mouth/Throat:      Lips: Pink. Mouth: Mucous membranes are moist.      Pharynx: Oropharynx is clear. Uvula midline. Eyes:      Extraocular Movements: Extraocular movements intact. Conjunctiva/sclera: Conjunctivae normal.   Neck:      Musculoskeletal: Normal range of motion and neck supple. Cardiovascular:      Rate and Rhythm: Regular rhythm. Bradycardia present. Pulses: Normal pulses. Pulmonary:      Effort: Pulmonary effort is normal.      Breath sounds: Normal breath sounds. No wheezing, rhonchi or rales. Abdominal:      General: Abdomen is flat.  Bowel cramping. Will call with results. Pt to follow up with PCP. Go to the ER for any emergent concern. Byron BOWER CNP,  spent at least 50% of the face-to-face time in counseling, explanation of diagnosis, planning of further management, and answering all questions. Patient was seen with total face to face time of 30 minutes. Patient given educational materials - see patientinstructions. Discussed use, benefit, and side effects of prescribed medications. All patient questions answered. Pt verbalized understanding. Instructed to continue current medications, diet and exercise. Patient agreedwith treatment plan. Follow up as directed.      Electronically signed by SATHISH Yun CNP on 3/20/2020 at 9:16 AM

## 2020-04-21 ENCOUNTER — TELEMEDICINE (OUTPATIENT)
Dept: NEUROLOGY | Age: 35
End: 2020-04-21
Payer: COMMERCIAL

## 2020-04-21 PROCEDURE — G8420 CALC BMI NORM PARAMETERS: HCPCS | Performed by: PSYCHIATRY & NEUROLOGY

## 2020-04-21 PROCEDURE — G8427 DOCREV CUR MEDS BY ELIG CLIN: HCPCS | Performed by: PSYCHIATRY & NEUROLOGY

## 2020-04-21 PROCEDURE — 4004F PT TOBACCO SCREEN RCVD TLK: CPT | Performed by: PSYCHIATRY & NEUROLOGY

## 2020-04-21 PROCEDURE — 99214 OFFICE O/P EST MOD 30 MIN: CPT | Performed by: PSYCHIATRY & NEUROLOGY

## 2020-04-21 RX ORDER — TOPIRAMATE 25 MG/1
TABLET ORAL
Qty: 270 TABLET | Refills: 3 | Status: SHIPPED | OUTPATIENT
Start: 2020-04-21 | End: 2020-11-12 | Stop reason: SDUPTHER

## 2020-04-23 ENCOUNTER — TELEMEDICINE (OUTPATIENT)
Dept: FAMILY MEDICINE CLINIC | Age: 35
End: 2020-04-23
Payer: COMMERCIAL

## 2020-04-23 VITALS — WEIGHT: 138 LBS | HEIGHT: 66 IN | BODY MASS INDEX: 22.18 KG/M2

## 2020-04-23 PROCEDURE — 99214 OFFICE O/P EST MOD 30 MIN: CPT | Performed by: FAMILY MEDICINE

## 2020-04-23 RX ORDER — PREDNISONE 20 MG/1
20 TABLET ORAL 2 TIMES DAILY
Qty: 10 TABLET | Refills: 0 | Status: SHIPPED | OUTPATIENT
Start: 2020-04-23 | End: 2020-04-28

## 2020-04-23 ASSESSMENT — ENCOUNTER SYMPTOMS
EYES NEGATIVE: 1
GASTROINTESTINAL NEGATIVE: 1
RESPIRATORY NEGATIVE: 1

## 2020-04-23 NOTE — PROGRESS NOTES
2020    TELEHEALTH EVALUATION -- Audio/Visual (During TBKGB-52 public health emergency)    HPI:    Arabella Bermudez (:  1985) has requested an audio/video evaluation for the following concern(s):    The patient is concerned about weight loss with topamax. The patient states that she has been losing weight with the topamax. She is down to 138 pounds. She was trying to lose weight prior to starting the topamax but she stopped it prior to starting on the topamax. The topamax is working for her migraine. The patient was seen by Andre Capellan for her left shoulder a few weeks ago as well. She has limited range of motion and pain with extending her left shoulder. She injured her shoulder about 1-2 months ago. She states it was feeling a little better especially with steroids but 2 weeks after that the pain gradually returns. 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 The patient states she has been having more pain and numbness with her left side due to spinal stenosis she is following up with neurology regarding this issue. Patient requests referral to gyn as well. Review of Systems   Constitutional: Negative. HENT: Negative. Eyes: Negative. Respiratory: Negative. Cardiovascular: Negative. Gastrointestinal: Negative. Genitourinary: Negative. Musculoskeletal: Positive for arthralgias. Skin: Negative. Allergic/Immunologic: Negative for environmental allergies, food allergies and immunocompromised state. Neurological: Negative. Psychiatric/Behavioral: Negative. All other systems reviewed and are negative. Prior to Visit Medications    Medication Sig Taking?  Authorizing Provider   predniSONE (DELTASONE) 20 MG tablet Take 1 tablet by mouth 2 times daily for 5 days Yes Shae Abbott MD   topiramate (TOPAMAX) 25 MG tablet 50mg AM, 25mg PM Yes Baldomero Bonilla MD   famotidine (PEPCID) 40 MG tablet Take 1 tablet by mouth every evening Yes Gin Braden PA-C   Cholecalciferol (VITAMIN D3 PO) Take 2,000 Units by mouth daily Yes Kt Marcelo MD   vitamin B-2 (RIBOFLAVIN) 100 MG TABS tablet Take 1 tablet by mouth daily Yes Kt Marcelo MD       Social History     Tobacco Use    Smoking status: Current Every Day Smoker     Packs/day: 1.00     Years: 17.00     Pack years: 17.00     Types: Cigarettes    Smokeless tobacco: Never Used   Substance Use Topics    Alcohol use: Yes     Alcohol/week: 0.0 standard drinks     Frequency: Monthly or less     Comment: socially    Drug use: No     Comment: Patient had used cocaine and marijuana in the past , only nasal        Allergies   Allergen Reactions    Latex Other (See Comments)     Redness and swelling    Banana Anaphylaxis     Lips swell    Banana Extract Allergy Skin Test Anaphylaxis    Avocado     Coconut Fatty Acids     Coconut Oil     Kiwi Extract     [place] Flavor [Flavoring Agent]     Papaya Derivatives     Tape [Adhesive Tape] Other (See Comments)     Plastic tape causes skin irritation    Maxalt [Rizatriptan Benzoate] Nausea And Vomiting   ,   Past Medical History:   Diagnosis Date    Acid reflux     Anxiety     Fibromyalgia     Headache     migraines    HPV (human papilloma virus) anogenital infection     Insomnia     Movement disorder     spasms, B&B    Neuropathy     left hands, feet    Rheumatic fever     Spinal stenosis     Wears glasses    ,   Past Surgical History:   Procedure Laterality Date    CHOLECYSTECTOMY      DENTAL SURGERY      removed some teeth at age 1   Deb Butt TONSILLECTOMY AND ADENOIDECTOMY Bilateral 1/7/2016   ,   Social History     Tobacco Use    Smoking status: Current Every Day Smoker     Packs/day: 1.00     Years: 17.00     Pack years: 17.00     Types: Cigarettes    Smokeless tobacco: Never Used   Substance Use Topics    Alcohol use:  Yes     Alcohol/week: 0.0 standard drinks Frequency: Monthly or less     Comment: socially    Drug use: No     Comment: Patient had used cocaine and marijuana in the past , only nasal   ,   Family History   Problem Relation Age of Onset    Depression Mother     Diabetes Father     High Blood Pressure Father     High Cholesterol Father     Obesity Father     Other Father     High Blood Pressure Paternal Grandmother     Breast Cancer Paternal Grandmother     High Cholesterol Paternal Grandmother     Asthma Brother     Other Brother         Peanut allergies    Depression Brother     No Known Problems Maternal Grandmother     No Known Problems Maternal Grandfather     No Known Problems Paternal Grandfather    ,   Immunization History   Administered Date(s) Administered    Hepatitis B 07/03/2014, 08/04/2014, 01/06/2015    Hepatitis B vaccine 07/03/2014, 08/04/2014, 01/06/2015    Influenza Vaccine, unspecified formulation 10/01/2014, 11/29/2016    Influenza Virus Vaccine 01/06/2015, 12/04/2018    Influenza, Quadv, IM, (6 mo and older Fluzone, Flulaval, Fluarix and 3 yrs and older Afluria) 12/04/2018    Influenza, Quadv, IM, PF (6 mo and older Fluzone, Flulaval, Fluarix, and 3 yrs and older Afluria) 01/06/2015, 11/29/2016    MMR 07/03/2014    Pneumococcal Polysaccharide ([id number]) 08/26/2015, 12/04/2018    Tdap (Boostrix, Adacel) 09/01/2013, 11/29/2016   ,   Health Maintenance   Topic Date Due    Cervical cancer screen  05/26/2018    Varicella vaccine (1 of 2 - 2-dose childhood series) 09/04/2020 (Originally 5/6/1986)    Flu vaccine (Season Ended) 09/04/2020 (Originally 9/1/2020)    DTaP/Tdap/Td vaccine (3 - Td) 11/29/2026    Pneumococcal 0-64 years Vaccine  Completed    HIV screen  Completed    Hepatitis A vaccine  Aged Out    Hepatitis B vaccine  Aged Out    Hib vaccine  Aged Out    Meningococcal (ACWY) vaccine  Aged Out       PHYSICAL EXAMINATION:  [ INSTRUCTIONS:  \"[x]\" Indicates a positive item  \"[]\" Indicates a negative excellent migraine prevention. Will reassess in 3-6 months. Labs done within the last 6 months reviewed. 2. Acute pain of left shoulder  Patient to follow up on doing exercises for impingement and rotator cuff. Steroids re prescribed d/t relief with the last round. Patient advised to use ice on the area and do exercises. If no improvement patient to go see ortho surgery to discuss imaging/next steps. - Gracy Kc DO, Orthopedic Surgery, [place]  - predniSONE (DELTASONE) 20 MG tablet; Take 1 tablet by mouth 2 times daily for 5 days  Dispense: 10 tablet; Refill: 0    3. HPV (human papilloma virus) anogenital infection  Patient has h/o HPV, referral provided to Dr. Kayleen Dietrich for continued gyn care. - Jude Renee DO, OB/GYN, [place]    4. Migraine without aura and without status migrainosus, not intractable  Continue with topamax, maxalt prn, vitamin B2 per neurology. 5. Tobacco abuse  I did  the patient about the importance of smoking cessation, she is not currently interested in quitting smoking. Patient advised of options for smoking cessation and provided her with the information on the [place]. No follow-ups on file. Blima Cooks is a 27 y.o. female being evaluated by a Virtual Visit (video visit) encounter to address concerns as mentioned above. A caregiver was present when appropriate. Due to this being a TeleHealth encounter (During OGMID-27 public health emergency), evaluation of the following organ systems was limited: Vitals/Constitutional/EENT/Resp/CV/GI//MS/Neuro/Skin/Heme-Lymph-Imm.   Pursuant to the emergency declaration under the [facility], [address] [place] [address] waiver authority and the [employer] Act, this Virtual Visit was conducted with patient's (and/or legal guardian's) consent, to reduce the patient's risk of exposure to COVID-19 and provide necessary

## 2020-04-24 PROBLEM — R63.4 WEIGHT LOSS DUE TO MEDICATION: Status: ACTIVE | Noted: 2020-04-24

## 2020-04-24 PROBLEM — T50.905A WEIGHT LOSS DUE TO MEDICATION: Status: ACTIVE | Noted: 2020-04-24

## 2020-05-29 ENCOUNTER — PATIENT MESSAGE (OUTPATIENT)
Dept: FAMILY MEDICINE CLINIC | Age: 35
End: 2020-05-29

## 2020-06-01 ENCOUNTER — HOSPITAL ENCOUNTER (OUTPATIENT)
Dept: CT IMAGING | Age: 35
Discharge: HOME | End: 2020-06-03
Payer: COMMERCIAL

## 2020-06-01 ENCOUNTER — TELEMEDICINE (OUTPATIENT)
Dept: FAMILY MEDICINE CLINIC | Age: 35
End: 2020-06-01
Payer: COMMERCIAL

## 2020-06-01 ENCOUNTER — TELEPHONE (OUTPATIENT)
Dept: FAMILY MEDICINE CLINIC | Age: 35
End: 2020-06-01

## 2020-06-01 ENCOUNTER — HOSPITAL ENCOUNTER (OUTPATIENT)
Age: 35
Discharge: HOME | End: 2020-06-01
Payer: COMMERCIAL

## 2020-06-01 LAB
ABSOLUTE EOS #: 0.11 K/UL (ref 0–0.44)
ABSOLUTE IMMATURE GRANULOCYTE: <0.03 K/UL (ref 0–0.3)
ABSOLUTE LYMPH #: 2.9 K/UL (ref 1.1–3.7)
ABSOLUTE MONO #: 0.72 K/UL (ref 0.1–1.2)
ALBUMIN SERPL-MCNC: 4.5 G/DL (ref 3.5–5.2)
ALBUMIN/GLOBULIN RATIO: 1.8 (ref 1–2.5)
ALP BLD-CCNC: 51 U/L (ref 35–104)
ALT SERPL-CCNC: 10 U/L (ref 5–33)
ANION GAP SERPL CALCULATED.3IONS-SCNC: 15 MMOL/L (ref 9–17)
AST SERPL-CCNC: 14 U/L
BASOPHILS # BLD: 1 % (ref 0–2)
BASOPHILS ABSOLUTE: 0.05 K/UL (ref 0–0.2)
BILIRUB SERPL-MCNC: 0.45 MG/DL (ref 0.3–1.2)
BILIRUBIN URINE: NEGATIVE
BUN BLDV-MCNC: 11 MG/DL (ref 6–20)
BUN/CREAT BLD: NORMAL (ref 9–20)
CALCIUM SERPL-MCNC: 9.5 MG/DL (ref 8.6–10.4)
CHLORIDE BLD-SCNC: 104 MMOL/L (ref 98–107)
CO2: 22 MMOL/L (ref 20–31)
COLOR: YELLOW
COMMENT UA: ABNORMAL
CREAT SERPL-MCNC: 0.78 MG/DL (ref 0.5–0.9)
DIFFERENTIAL TYPE: ABNORMAL
EOSINOPHILS RELATIVE PERCENT: 1 % (ref 1–4)
GFR AFRICAN AMERICAN: >60 ML/MIN
GFR NON-AFRICAN AMERICAN: >60 ML/MIN
GFR SERPL CREATININE-BSD FRML MDRD: NORMAL ML/MIN/{1.73_M2}
GFR SERPL CREATININE-BSD FRML MDRD: NORMAL ML/MIN/{1.73_M2}
GLUCOSE BLD-MCNC: 85 MG/DL (ref 70–99)
GLUCOSE URINE: NEGATIVE
HCG QUALITATIVE: NEGATIVE
HCT VFR BLD CALC: 49.6 % (ref 36.3–47.1)
HEMOGLOBIN: 15.7 G/DL (ref 11.9–15.1)
IMMATURE GRANULOCYTES: 0 %
KETONES, URINE: ABNORMAL
LEUKOCYTE ESTERASE, URINE: NEGATIVE
LIPASE: 33 U/L (ref 13–60)
LYMPHOCYTES # BLD: 27 % (ref 24–43)
MCH RBC QN AUTO: 28.3 PG (ref 25.2–33.5)
MCHC RBC AUTO-ENTMCNC: 31.7 G/DL (ref 28.4–34.8)
MCV RBC AUTO: 89.4 FL (ref 82.6–102.9)
MONOCYTES # BLD: 7 % (ref 3–12)
NITRITE, URINE: NEGATIVE
NRBC AUTOMATED: 0 PER 100 WBC
PDW BLD-RTO: 13.2 % (ref 11.8–14.4)
PH UA: 6.5 (ref 5–8)
PLATELET # BLD: 263 K/UL (ref 138–453)
PLATELET ESTIMATE: ABNORMAL
PMV BLD AUTO: 10.3 FL (ref 8.1–13.5)
POTASSIUM SERPL-SCNC: 4.5 MMOL/L (ref 3.7–5.3)
PROTEIN UA: NEGATIVE
RBC # BLD: 5.55 M/UL (ref 3.95–5.11)
RBC # BLD: ABNORMAL 10*6/UL
SEG NEUTROPHILS: 64 % (ref 36–65)
SEGMENTED NEUTROPHILS ABSOLUTE COUNT: 6.95 K/UL (ref 1.5–8.1)
SODIUM BLD-SCNC: 141 MMOL/L (ref 135–144)
SPECIFIC GRAVITY UA: 1.01 (ref 1–1.03)
TOTAL PROTEIN: 7 G/DL (ref 6.4–8.3)
TURBIDITY: CLEAR
URINE HGB: NEGATIVE
UROBILINOGEN, URINE: NORMAL
WBC # BLD: 10.8 K/UL (ref 3.5–11.3)
WBC # BLD: ABNORMAL 10*3/UL

## 2020-06-01 PROCEDURE — 99213 OFFICE O/P EST LOW 20 MIN: CPT | Performed by: PHYSICIAN ASSISTANT

## 2020-06-01 PROCEDURE — 2580000003 HC RX 258: Performed by: PHYSICIAN ASSISTANT

## 2020-06-01 PROCEDURE — 85025 COMPLETE CBC W/AUTO DIFF WBC: CPT

## 2020-06-01 PROCEDURE — 36415 COLL VENOUS BLD VENIPUNCTURE: CPT

## 2020-06-01 PROCEDURE — 80053 COMPREHEN METABOLIC PANEL: CPT

## 2020-06-01 PROCEDURE — 81003 URINALYSIS AUTO W/O SCOPE: CPT

## 2020-06-01 PROCEDURE — 83690 ASSAY OF LIPASE: CPT

## 2020-06-01 PROCEDURE — 74177 CT ABD & PELVIS W/CONTRAST: CPT

## 2020-06-01 PROCEDURE — G8427 DOCREV CUR MEDS BY ELIG CLIN: HCPCS | Performed by: PHYSICIAN ASSISTANT

## 2020-06-01 PROCEDURE — 84703 CHORIONIC GONADOTROPIN ASSAY: CPT

## 2020-06-01 PROCEDURE — 6360000004 HC RX CONTRAST MEDICATION: Performed by: PHYSICIAN ASSISTANT

## 2020-06-01 RX ORDER — SODIUM CHLORIDE 0.9 % (FLUSH) 0.9 %
10 SYRINGE (ML) INJECTION PRN
Status: DISCONTINUED | OUTPATIENT
Start: 2020-06-01 | End: 2020-06-04 | Stop reason: HOSPADM

## 2020-06-01 RX ORDER — 0.9 % SODIUM CHLORIDE 0.9 %
80 INTRAVENOUS SOLUTION INTRAVENOUS ONCE
Status: COMPLETED | OUTPATIENT
Start: 2020-06-01 | End: 2020-06-01

## 2020-06-01 RX ADMIN — Medication 10 ML: at 15:12

## 2020-06-01 RX ADMIN — IOVERSOL 75 ML: 741 INJECTION INTRA-ARTERIAL; INTRAVENOUS at 15:11

## 2020-06-01 RX ADMIN — SODIUM CHLORIDE 80 ML: 9 INJECTION, SOLUTION INTRAVENOUS at 15:12

## 2020-06-01 NOTE — TELEPHONE ENCOUNTER
Patient was called with negative CT findings of the abdomen and pelvis. Patient states that when the IV contrast was pushed she had complained of pain. Patient states that her arm is now painful and swollen. Patient is instructed to take ibuprofen and use warm moist compresses to the affected area and go directly to the emergency room if symptoms worsen and follow-up in the office tomorrow if symptoms do not improve. Limited exam by CT scan due to infiltration of IV contrast and no IV contrast on the study.   Awaiting blood work

## 2020-06-02 ENCOUNTER — TELEPHONE (OUTPATIENT)
Dept: FAMILY MEDICINE CLINIC | Age: 35
End: 2020-06-02

## 2020-06-02 ASSESSMENT — ENCOUNTER SYMPTOMS
WHEEZING: 0
CONSTIPATION: 0
VOMITING: 0
BACK PAIN: 0
SHORTNESS OF BREATH: 0
DIARRHEA: 1
ABDOMINAL PAIN: 1
ABDOMINAL DISTENTION: 0
ANAL BLEEDING: 0
COUGH: 0
NAUSEA: 0
RECTAL PAIN: 0
CHEST TIGHTNESS: 0
BLOOD IN STOOL: 0

## 2020-06-03 ENCOUNTER — HOSPITAL ENCOUNTER (OUTPATIENT)
Age: 35
Setting detail: SPECIMEN
Discharge: HOME | End: 2020-06-03
Payer: COMMERCIAL

## 2020-06-03 LAB
DATE, STOOL #1: NORMAL
DATE, STOOL #2: NORMAL
DATE, STOOL #3: NORMAL
HEMOCCULT SP1 STL QL: NEGATIVE
HEMOCCULT SP2 STL QL: NORMAL
HEMOCCULT SP3 STL QL: NORMAL
LACTOFERRIN, QUAL: NORMAL
TIME, STOOL #1: NORMAL
TIME, STOOL #2: NORMAL
TIME, STOOL #3: NORMAL

## 2020-06-03 PROCEDURE — 82272 OCCULT BLD FECES 1-3 TESTS: CPT

## 2020-06-03 PROCEDURE — 83630 LACTOFERRIN FECAL (QUAL): CPT

## 2020-06-03 PROCEDURE — 83520 IMMUNOASSAY QUANT NOS NONAB: CPT

## 2020-06-03 PROCEDURE — 82705 FATS/LIPIDS FECES QUAL: CPT

## 2020-06-06 LAB
FAT QUALITATIVE SPLIT STOOL: NORMAL
FECAL NEUTRAL FAT: NORMAL

## 2020-06-07 LAB — FECAL PANCREATIC ELASTASE-1: >500 UG/G

## 2020-06-11 ENCOUNTER — TELEPHONE (OUTPATIENT)
Dept: GASTROENTEROLOGY | Age: 35
End: 2020-06-11

## 2020-06-11 NOTE — TELEPHONE ENCOUNTER
April F from [employer] skyped msg'd writer wanting to know if we could get a pt in fairly soon with our GI practice. Writer sched pt w/ [facility] Fri 6/12/20 @ 11:45am.  Address was given to April and date & time of appt. Pt will instruct pt of information.

## 2020-06-17 ENCOUNTER — HOSPITAL ENCOUNTER (OUTPATIENT)
Age: 35
Discharge: HOME | End: 2020-06-17
Payer: COMMERCIAL

## 2020-06-17 ENCOUNTER — OFFICE VISIT (OUTPATIENT)
Dept: GASTROENTEROLOGY | Age: 35
End: 2020-06-17
Payer: COMMERCIAL

## 2020-06-17 VITALS — RESPIRATION RATE: 19 BRPM | WEIGHT: 133 LBS | TEMPERATURE: 97.2 F | BODY MASS INDEX: 21.47 KG/M2

## 2020-06-17 LAB — TSH SERPL DL<=0.05 MIU/L-ACNC: 0.66 MIU/L (ref 0.3–5)

## 2020-06-17 PROCEDURE — 82785 ASSAY OF IGE: CPT

## 2020-06-17 PROCEDURE — G8427 DOCREV CUR MEDS BY ELIG CLIN: HCPCS | Performed by: INTERNAL MEDICINE

## 2020-06-17 PROCEDURE — 84443 ASSAY THYROID STIM HORMONE: CPT

## 2020-06-17 PROCEDURE — 36415 COLL VENOUS BLD VENIPUNCTURE: CPT

## 2020-06-17 PROCEDURE — 82784 ASSAY IGA/IGD/IGG/IGM EACH: CPT

## 2020-06-17 PROCEDURE — 4004F PT TOBACCO SCREEN RCVD TLK: CPT | Performed by: INTERNAL MEDICINE

## 2020-06-17 PROCEDURE — 83516 IMMUNOASSAY NONANTIBODY: CPT

## 2020-06-17 PROCEDURE — 86003 ALLG SPEC IGE CRUDE XTRC EA: CPT

## 2020-06-17 PROCEDURE — 99202 OFFICE O/P NEW SF 15 MIN: CPT | Performed by: INTERNAL MEDICINE

## 2020-06-17 PROCEDURE — G8420 CALC BMI NORM PARAMETERS: HCPCS | Performed by: INTERNAL MEDICINE

## 2020-06-17 RX ORDER — HYOSCYAMINE SULFATE 0.125 MG
125 TABLET ORAL EVERY 4 HOURS PRN
Qty: 180 TABLET | Refills: 3 | Status: SHIPPED | OUTPATIENT
Start: 2020-06-17 | End: 2021-07-26 | Stop reason: ALTCHOICE

## 2020-06-17 RX ORDER — LOPERAMIDE HYDROCHLORIDE 2 MG/1
2 CAPSULE ORAL 4 TIMES DAILY PRN
Qty: 30 CAPSULE | Refills: 1 | Status: SHIPPED | OUTPATIENT
Start: 2020-06-17 | End: 2020-06-27

## 2020-06-17 RX ORDER — ONDANSETRON 4 MG/1
4 TABLET, FILM COATED ORAL DAILY PRN
Qty: 30 TABLET | Refills: 0 | Status: SHIPPED | OUTPATIENT
Start: 2020-06-17 | End: 2021-07-26

## 2020-06-17 RX ORDER — WHEAT DEXTRIN 3 G/3.8 G
4 POWDER (GRAM) ORAL
Qty: 30 CAN | Refills: 0 | Status: SHIPPED | OUTPATIENT
Start: 2020-06-17 | End: 2021-07-26

## 2020-06-17 ASSESSMENT — ENCOUNTER SYMPTOMS
NAUSEA: 1
EYES NEGATIVE: 1
RESPIRATORY NEGATIVE: 1
CONSTIPATION: 1
BACK PAIN: 1
ABDOMINAL PAIN: 1

## 2020-06-17 NOTE — PROGRESS NOTES
Spouse name: Not on file    Number of children: Not on file    Years of education: Not on file    Highest education level: Not on file   Occupational History    Occupation: student     Employer: NONE   Social Needs    Financial resource strain: Not on file    Food insecurity     Worry: Not on file     Inability: Not on file    Transportation needs     Medical: Not on file     Non-medical: Not on file   Tobacco Use    Smoking status: Current Every Day Smoker     Packs/day: 1.00     Years: 17.00     Pack years: 17.00     Types: Cigarettes     Start date: 6/17/2004    Smokeless tobacco: Never Used   Substance and Sexual Activity    Alcohol use: Yes     Alcohol/week: 0.0 standard drinks     Frequency: Monthly or less     Comment: socially    Drug use: Yes     Types: Marijuana     Comment: Patient states smoking to help her eat    Sexual activity: Not Currently     Birth control/protection: I.U.D. Lifestyle    Physical activity     Days per week: Not on file     Minutes per session: Not on file    Stress: Not on file   Relationships    Social connections     Talks on phone: Not on file     Gets together: Not on file     Attends Mandaen service: Not on file     Active member of club or organization: Not on file     Attends meetings of clubs or organizations: Not on file     Relationship status: Not on file    Intimate partner violence     Fear of current or ex partner: Not on file     Emotionally abused: Not on file     Physically abused: Not on file     Forced sexual activity: Not on file   Other Topics Concern    Not on file   Social History Narrative    Not on file         REVIEW OF SYSTEMS: A 12-point review of systems was obtained and pertinent positives and negatives were listed below. REVIEW OF SYSTEMS:     Constitutional: No fever, no chills, no lethargy, no weakness. HEENT:  No headache, otalgia, itchy eyes, nasal discharge or sore throat.   Cardiac:  No chest pain, dyspnea, 06/01/2020    ALKPHOS 51 06/01/2020    AST 14 06/01/2020    ALT 10 06/01/2020         Lab Results   Component Value Date    RBC 5.55 (H) 06/01/2020    HGB 15.7 (H) 06/01/2020    MCV 89.4 06/01/2020    MCH 28.3 06/01/2020    MCHC 31.7 06/01/2020    RDW 13.2 06/01/2020    MPV 10.3 06/01/2020    BASOPCT 1 06/01/2020    LYMPHSABS 2.90 06/01/2020    MONOSABS 0.72 06/01/2020    NEUTROABS 6.95 06/01/2020    EOSABS 0.11 06/01/2020    BASOSABS 0.05 06/01/2020         DIAGNOSTIC TESTING:     Ct Abdomen Pelvis W Iv Contrast Additional Contrast? None    Addendum Date: 6/1/2020    ADDENDUM: No significant contrast is seen in the vascular tree or organs. Contrast was given and this infiltrated at the IV site. Result Date: 6/1/2020  EXAMINATION: CT OF THE ABDOMEN AND PELVIS without CONTRAST 6/1/2020 2:59 pm TECHNIQUE: CT of the abdomen and pelvis was performed with the administration of intravenous contrast. Multiplanar reformatted images are provided for review. Dose modulation, iterative reconstruction, and/or weight based adjustment of the mA/kV was utilized to reduce the radiation dose to as low as reasonably achievable. COMPARISON: None. HISTORY: ORDERING SYSTEM PROVIDED HISTORY: Lower abdominal pain TECHNOLOGIST PROVIDED HISTORY: diarrhea, abd pain Reason for Exam: lower abd pain Acuity: Chronic Type of Exam: Initial Additional signs and symptoms: diarrhea Relevant Medical/Surgical History: Sx GB, former smoker FINDINGS: Lower Chest: Clear Organs: The lack of intravenous contrast decreases sensitivity for detection of visceral organ abnormalities. There is no evidence for nephrocalcinosis or hydronephrosis. GI/Bowel: No dilated large or small bowel loops are seen. Pelvis: There is an intrauterine device within the uterus. The bladder is unremarkable in appearance. No free air is seen within the pelvis. There is free fluid in the pelvis which may be physiologic.  Peritoneum/Retroperitoneum: No pathologically enlarged encounter on a separate note. Elda Segura MD, MPH   [facility]  Office #: [phone]          this note is created with the assistance of a speech recognition program.  While intending to generate a document that actually reflects the content of the visit, the document can still have some errors including those of syntax and sound a like substitutions which may escape proof reading. It such instances, actual meaning can be extrapolated by contextual diversion.

## 2020-06-18 LAB
ALLERGEN BARLEY IGE: 0.76 KU/L (ref 0–0.34)
ALLERGEN BEEF: <0.34 KU/L (ref 0–0.34)
ALLERGEN CABBAGE IGE: 0.91 KU/L (ref 0–0.34)
ALLERGEN CARROT IGE: 0.73 KU/L (ref 0–0.34)
ALLERGEN CHICKEN IGE: <0.34 KU/L (ref 0–0.34)
ALLERGEN CODFISH IGE: <0.34 KU/L (ref 0–0.34)
ALLERGEN CORN IGE: 0.7 KU/L (ref 0–0.34)
ALLERGEN COW MILK IGE: <0.34 KU/L (ref 0–0.34)
ALLERGEN CRAB IGE: <0.34 KU/L (ref 0–0.34)
ALLERGEN EGG WHITE IGE: <0.34 KU/L (ref 0–0.34)
ALLERGEN GRAPE IGE: 0.55 KU/L (ref 0–0.34)
ALLERGEN LETTUCE IGE: 0.65 KU/L (ref 0–0.34)
ALLERGEN NAVY BEAN: 0.89 KU/L (ref 0–0.34)
ALLERGEN OAT: 0.75 KU/L (ref 0–0.34)
ALLERGEN ORANGE IGE: 0.71 KU/L (ref 0–0.34)
ALLERGEN PEANUT (F13) IGE: 1.04 KU/L (ref 0–0.34)
ALLERGEN PEPPER C. ANNUUM IGE: 0.75 KU/L (ref 0–0.34)
ALLERGEN PORK: <0.34 KU/L (ref 0–0.34)
ALLERGEN RICE IGE: 0.92 KU/L (ref 0–0.34)
ALLERGEN RYE IGE: 0.84 KU/L (ref 0–0.34)
ALLERGEN SOYBEAN IGE: 0.74 KU/L (ref 0–0.34)
ALLERGEN TOMATO IGE: 0.93 KU/L (ref 0–0.34)
ALLERGEN TUNA IGE: <0.34 KU/L (ref 0–0.34)
ALLERGEN WHEAT IGE: 0.87 KU/L (ref 0–0.34)
GLIADIN DEAMINIDATED PEPTIDE AB IGA: 2 U/ML
GLIADIN DEAMINIDATED PEPTIDE AB IGG: <0.4 U/ML
IGA: 157 MG/DL (ref 70–400)
IGE: 117 IU/ML
POTATO, IGE: 0.9 KU/L (ref 0–0.34)
SHRIMP: <0.34 KU/L (ref 0–0.34)
TISSUE TRANSGLUTAMINASE IGA: 0.4 U/ML

## 2020-06-19 ENCOUNTER — PATIENT MESSAGE (OUTPATIENT)
Dept: GASTROENTEROLOGY | Age: 35
End: 2020-06-19

## 2020-06-25 ENCOUNTER — TELEPHONE (OUTPATIENT)
Dept: GASTROENTEROLOGY | Age: 35
End: 2020-06-25

## 2020-07-07 ENCOUNTER — OFFICE VISIT (OUTPATIENT)
Dept: GASTROENTEROLOGY | Age: 35
End: 2020-07-07
Payer: COMMERCIAL

## 2020-07-07 ENCOUNTER — HOSPITAL ENCOUNTER (OUTPATIENT)
Age: 35
Discharge: HOME | End: 2020-07-07
Payer: COMMERCIAL

## 2020-07-07 VITALS — BODY MASS INDEX: 21.63 KG/M2 | WEIGHT: 134 LBS | TEMPERATURE: 97.4 F | RESPIRATION RATE: 19 BRPM

## 2020-07-07 PROCEDURE — G8427 DOCREV CUR MEDS BY ELIG CLIN: HCPCS | Performed by: INTERNAL MEDICINE

## 2020-07-07 PROCEDURE — 88350 IMFLUOR EA ADDL 1ANTB STN PX: CPT

## 2020-07-07 PROCEDURE — 4004F PT TOBACCO SCREEN RCVD TLK: CPT | Performed by: INTERNAL MEDICINE

## 2020-07-07 PROCEDURE — 82397 CHEMILUMINESCENT ASSAY: CPT

## 2020-07-07 PROCEDURE — 81479 UNLISTED MOLECULAR PATHOLOGY: CPT

## 2020-07-07 PROCEDURE — G8420 CALC BMI NORM PARAMETERS: HCPCS | Performed by: INTERNAL MEDICINE

## 2020-07-07 PROCEDURE — 88346 IMFLUOR 1ST 1ANTB STAIN PX: CPT

## 2020-07-07 PROCEDURE — 99212 OFFICE O/P EST SF 10 MIN: CPT | Performed by: INTERNAL MEDICINE

## 2020-07-07 PROCEDURE — 86140 C-REACTIVE PROTEIN: CPT

## 2020-07-07 PROCEDURE — 36415 COLL VENOUS BLD VENIPUNCTURE: CPT

## 2020-07-07 PROCEDURE — 83520 IMMUNOASSAY QUANT NOS NONAB: CPT

## 2020-07-07 ASSESSMENT — ENCOUNTER SYMPTOMS
BACK PAIN: 1
RESPIRATORY NEGATIVE: 1
EYES NEGATIVE: 1
NAUSEA: 1

## 2020-07-07 NOTE — PROGRESS NOTES
GI FOLLOW UP    INTERVAL HISTORY:     Found to have multiple food allergies  Clinically much improved  Avoiding food allergens    Chief Complaint   Patient presents with    Follow-up     OV follow up. Patient has been informed of food allergies, she states since stopping eating all of the foods she has not been having abdominal pain like she previously was. She has stopped taking nexium and states not having any problems with her reflux. HISTORY OF PRESENT ILLNESS: Ms.Christine Mazin Khalil is a 38 y.o. female with a past history remarkable for diarrhea, referred for evaluation of of the symptoms     Diarrhea-onset was in March 2020. UGI symptoms initially, now lower GI symptoms  With multiple episodes of loose watery semi-formed bowel movements xaggerated gastrocolic reflex, 7-9 BM per day, oily per patient loose watery. Mid-abdominal pain, cramping sensation prior to each episode. No prior history of similar presentation. Patient has tried Imodium with an exaggerated response. Has not identified any specific dietary triggers. Fecal elastase was negative  CT abdomen was performed without any oral IV contrast     FH- Mother- Stomach issues? Declining appetite. Brother with UGI symptoms. No celiac disease in the family.     Smoke- 1 ppd day. 15 yrs ago. Social drinking  Marijuana daily  CCY removed 6 yrs ago. Hepatic hemangiomas.      Pancreatic divisim. Tonsillectomy in the past.   Rheumatic fever. History of migraines--  2 children     Past Medical,Family, and Social History reviewed and does contribute to the patient presenting condition. Patient's PMH/PSH,SH,PSYCH Hx, MEDs, ALLERGIES, and ROS were all reviewed and updated in the appropriate sections.     PAST MEDICAL HISTORY:  Past Medical History:   Diagnosis Date    Acid reflux     Anxiety     Fibromyalgia     Headache migraines    HPV (human papilloma virus) anogenital infection     Insomnia     Movement disorder     spasms, B&B    Neuropathy     left hands, feet    Rheumatic fever     Spinal stenosis     Wears glasses        Past Surgical History:   Procedure Laterality Date    CHOLECYSTECTOMY      DENTAL SURGERY      removed some teeth at age 1   Michelle Howell TONSILLECTOMY AND ADENOIDECTOMY Bilateral 1/7/2016       CURRENT MEDICATIONS:    Current Outpatient Medications:     ondansetron (ZOFRAN) 4 MG tablet, Take 1 tablet by mouth daily as needed for Nausea or Vomiting, Disp: 30 tablet, Rfl: 0    hyoscyamine (ANASPAZ;LEVSIN) 125 MCG tablet, Take 1 tablet by mouth every 4 hours as needed for Cramping, Disp: 180 tablet, Rfl: 3    topiramate (TOPAMAX) 25 MG tablet, 50mg AM, 25mg PM, Disp: 270 tablet, Rfl: 3    Cholecalciferol (VITAMIN D3 PO), Take 2,000 Units by mouth daily, Disp: , Rfl:     vitamin B-2 (RIBOFLAVIN) 100 MG TABS tablet, Take 1 tablet by mouth daily, Disp: 30 tablet, Rfl: 3    Wheat Dextrin (BENEFIBER) POWD, Take 4 g by mouth 3 times daily (with meals) (Patient not taking: Reported on 7/7/2020), Disp: 30 Can, Rfl: 0    famotidine (PEPCID) 40 MG tablet, Take 1 tablet by mouth every evening (Patient not taking: Reported on 7/7/2020), Disp: 30 tablet, Rfl: 3    ALLERGIES:   Allergies   Allergen Reactions    Latex Other (See Comments)     Redness and swelling    Banana Anaphylaxis     Lips swell    Banana Extract Allergy Skin Test Anaphylaxis    Avocado     Coconut Fatty Acids     Coconut Oil     Kiwi Extract     [place] Flavor [Flavoring Agent]     Papaya Derivatives     Tape [Adhesive Tape] Other (See Comments)     Plastic tape causes skin irritation    Maxalt [Rizatriptan Benzoate] Nausea And Vomiting       FAMILY HISTORY:       Problem Relation Age of Onset    Depression Mother     Diabetes Father     High Blood Pressure Father     High Cholesterol Father     Obesity Father    Michelle Howell Other Father    Michelle Howell High Blood Pressure Paternal Grandmother     Breast Cancer Paternal Grandmother     High Cholesterol Paternal Grandmother     Asthma Brother     Other Brother         Peanut allergies    Depression Brother     No Known Problems Maternal Grandmother     No Known Problems Maternal Grandfather     No Known Problems Paternal Grandfather          SOCIAL HISTORY:   Social History     Socioeconomic History    Marital status: Single     Spouse name: Not on file    Number of children: Not on file    Years of education: Not on file    Highest education level: Not on file   Occupational History    Occupation: student     Employer: NONE   Social Needs    Financial resource strain: Not on file    Food insecurity     Worry: Not on file     Inability: Not on file    Transportation needs     Medical: Not on file     Non-medical: Not on file   Tobacco Use    Smoking status: Current Every Day Smoker     Packs/day: 1.00     Years: 17.00     Pack years: 17.00     Types: Cigarettes     Start date: 6/17/2004    Smokeless tobacco: Never Used   Substance and Sexual Activity    Alcohol use: Yes     Alcohol/week: 0.0 standard drinks     Frequency: Monthly or less     Comment: socially    Drug use: Yes     Types: Marijuana     Comment: Patient states smoking to help her eat    Sexual activity: Not Currently     Birth control/protection: I.U.D.    Lifestyle    Physical activity     Days per week: Not on file     Minutes per session: Not on file    Stress: Not on file   Relationships    Social connections     Talks on phone: Not on file     Gets together: Not on file     Attends Episcopal service: Not on file     Active member of club or organization: Not on file     Attends meetings of clubs or organizations: Not on file     Relationship status: Not on file    Intimate partner violence     Fear of current or ex partner: Not on file     Emotionally abused: Not on file     Physically abused: Not on file     Forced sexual Patient has a normal mood and affect. Patient behavior is normal.       LABORATORY DATA: Reviewed  Lab Results   Component Value Date    WBC 10.8 06/01/2020    HGB 15.7 (H) 06/01/2020    HCT 49.6 (H) 06/01/2020    MCV 89.4 06/01/2020     06/01/2020     06/01/2020    K 4.5 06/01/2020     06/01/2020    CO2 22 06/01/2020    BUN 11 06/01/2020    CREATININE 0.78 06/01/2020    LABALBU 4.5 06/01/2020    BILITOT 0.45 06/01/2020    ALKPHOS 51 06/01/2020    AST 14 06/01/2020    ALT 10 06/01/2020         Lab Results   Component Value Date    RBC 5.55 (H) 06/01/2020    HGB 15.7 (H) 06/01/2020    MCV 89.4 06/01/2020    MCH 28.3 06/01/2020    MCHC 31.7 06/01/2020    RDW 13.2 06/01/2020    MPV 10.3 06/01/2020    BASOPCT 1 06/01/2020    LYMPHSABS 2.90 06/01/2020    MONOSABS 0.72 06/01/2020    NEUTROABS 6.95 06/01/2020    EOSABS 0.11 06/01/2020    BASOSABS 0.05 06/01/2020         DIAGNOSTIC TESTING:     No results found. IMPRESSION: Ms.Christine Teodora Uriarte [facility] is a 38 y.o. female with a past history remarkable for diarrhea, referred for evaluation of of the symptoms. Patient was identified to have multiple food allergens (potential) which include pepper, carrots, grapes, lettuce, navy bean, rye, soybean, tomato, wheat, potato, oat, rice, corn, and cabbage. After avoiding all of these potential food allergens patient has reported significant GI improvement with no further episodes of diarrhea or GI discomfort. Patient was referred to an allergist to identify true allergens and to potentially reintroduce sesame these potential allergens into her normal dietary pattern. PLAN:    1) possible food allergy- list of potential allergens discussed with patient, recommend the patient follow-up with an allergist was previously recommended. Patient was negative for celiac disease and had failed to obtain an IBD panel or stool test to further exclude other potential possibilities. 2) return to clinic 4-6 weeks. Thank you for allowing me to participate in the care of Ms. Khalil. For any further questions please do not hesitate to contact me. I have reviewed and agree with the ROS entered by the MA/LPN from today's encounter documented in a separate note. Janny Craft MD, MPH   [facility]  Office #: [phone]    this note is created with the assistance of a speech recognition program.  While intending to generate a document that actually reflects the content of the visit, the document can still have some errors including those of syntax and sound a like substitutions which may escape proof reading. It such instances, actual meaning can be extrapolated by contextual diversion.

## 2020-07-07 NOTE — PROGRESS NOTES
Subjective:      Patient ID: Katy Diaz is a 38 y.o. female. HPI    Review of Systems   Constitutional: Positive for fatigue. HENT: Negative. Eyes: Negative. Respiratory: Negative. Cardiovascular: Negative. Gastrointestinal: Positive for nausea. Endocrine: Negative. Genitourinary: Negative. Musculoskeletal: Positive for arthralgias and back pain. Skin: Negative. Allergic/Immunologic: Positive for food allergies. Neurological: Positive for light-headedness and headaches. Hematological: Bruises/bleeds easily. Psychiatric/Behavioral: Negative. All other systems reviewed and are negative.       Objective:   Physical Exam    Assessment:            Plan:

## 2020-07-14 LAB — IBD PANEL: NORMAL

## 2020-08-31 ENCOUNTER — TELEPHONE (OUTPATIENT)
Dept: GASTROENTEROLOGY | Age: 35
End: 2020-08-31

## 2020-09-01 ENCOUNTER — OFFICE VISIT (OUTPATIENT)
Dept: GASTROENTEROLOGY | Age: 35
End: 2020-09-01
Payer: COMMERCIAL

## 2020-09-01 ENCOUNTER — HOSPITAL ENCOUNTER (OUTPATIENT)
Age: 35
Setting detail: SPECIMEN
Discharge: HOME | End: 2020-09-01
Payer: COMMERCIAL

## 2020-09-01 ENCOUNTER — OFFICE VISIT (OUTPATIENT)
Dept: OBGYN CLINIC | Age: 35
End: 2020-09-01
Payer: COMMERCIAL

## 2020-09-01 VITALS — WEIGHT: 136 LBS | TEMPERATURE: 97 F | BODY MASS INDEX: 21.95 KG/M2 | RESPIRATION RATE: 18 BRPM

## 2020-09-01 VITALS
BODY MASS INDEX: 21.69 KG/M2 | SYSTOLIC BLOOD PRESSURE: 112 MMHG | HEIGHT: 66 IN | WEIGHT: 135 LBS | DIASTOLIC BLOOD PRESSURE: 62 MMHG

## 2020-09-01 PROCEDURE — G8427 DOCREV CUR MEDS BY ELIG CLIN: HCPCS | Performed by: INTERNAL MEDICINE

## 2020-09-01 PROCEDURE — 99385 PREV VISIT NEW AGE 18-39: CPT | Performed by: OBSTETRICS & GYNECOLOGY

## 2020-09-01 PROCEDURE — G8420 CALC BMI NORM PARAMETERS: HCPCS | Performed by: INTERNAL MEDICINE

## 2020-09-01 PROCEDURE — 99213 OFFICE O/P EST LOW 20 MIN: CPT | Performed by: INTERNAL MEDICINE

## 2020-09-01 PROCEDURE — 4004F PT TOBACCO SCREEN RCVD TLK: CPT | Performed by: INTERNAL MEDICINE

## 2020-09-01 ASSESSMENT — ENCOUNTER SYMPTOMS
BACK PAIN: 1
EYES NEGATIVE: 1
RESPIRATORY NEGATIVE: 1
GASTROINTESTINAL NEGATIVE: 1

## 2020-09-01 NOTE — PROGRESS NOTES
GI FOLLOW UP    INTERVAL HISTORY:     GERD-controlled  Asymptomatic   IBD panel not consistent with inflammatory bowel disease    Chief Complaint   Patient presents with    Follow-up     8 week follow up labs. Patient states only having abdominal pain when she eats what she shouldn't be. She states avoiding the trigger foods as much as possible and feeling good.  Gastroesophageal Reflux     Patient denies having any problems with gerd at this time.  Other     Patient states ocassionally being constipated then having diarrhea but states its easily controlled by her diet. HISTORY OF PRESENT ILLNESS: Ms.Christine Ronan Khalil is a 38 y.o. female with a past history remarkable for sciatica, chronic lower back pain and cervical neck pain, referred for evaluation of chronic diarrhea that was severe with multiple bowel movements onset in March 2020. Patient is identified to have a significant gastrocolic reflex that was associated with 9-10 bowel movements per day. Diarrhea-loose watery in nature, work-up included:  Fecal elastase-negative  Celiac testing negative  IBD panel negative  TSH negative  Infectious work-up negative however incomplete  Prior cholecystectomy approximately 6 years ago appears to be unrelated  Food comprehensive panel revealed:  Sensitivities to pepper, carrot, grape, lettuce, navy bean, peanut, rye, tomato, wheat, oat, potato, rice, barley, corn, cabbage. With nice clinical sensitivity to peanut      Smoke- 1 ppd day. 15 yrs ago. Social drinking  Marijuana daily  CCY removed 6 yrs ago. Hepatic hemangiomas.      Pancreatic divisim. Tonsillectomy in the past.   Rheumatic fever. History of migraines  2 children        Past Medical,Family, and Social History reviewed and does contribute to the patient presenting condition.     Patient's PMH/PSH,SH,PSYCH Hx, MEDs, ALLERGIES, and ROS were all reviewed and updated in the appropriate sections.     PAST MEDICAL HISTORY:  Past Medical History:   Diagnosis Date    Acid reflux     Anxiety     Fibromyalgia     Headache     migraines    HPV (human papilloma virus) anogenital infection     Insomnia     Movement disorder     spasms, B&B    Neuropathy     left hands, feet    Rheumatic fever     Spinal stenosis     Wears glasses        Past Surgical History:   Procedure Laterality Date    CHOLECYSTECTOMY      DENTAL SURGERY      removed some teeth at age 1   Hernando hCow TONSILLECTOMY AND ADENOIDECTOMY Bilateral 1/7/2016       CURRENT MEDICATIONS:    Current Outpatient Medications:     ondansetron (ZOFRAN) 4 MG tablet, Take 1 tablet by mouth daily as needed for Nausea or Vomiting, Disp: 30 tablet, Rfl: 0    hyoscyamine (ANASPAZ;LEVSIN) 125 MCG tablet, Take 1 tablet by mouth every 4 hours as needed for Cramping, Disp: 180 tablet, Rfl: 3    Wheat Dextrin (BENEFIBER) POWD, Take 4 g by mouth 3 times daily (with meals), Disp: 30 Can, Rfl: 0    topiramate (TOPAMAX) 25 MG tablet, 50mg AM, 25mg PM, Disp: 270 tablet, Rfl: 3    famotidine (PEPCID) 40 MG tablet, Take 1 tablet by mouth every evening, Disp: 30 tablet, Rfl: 3    Cholecalciferol (VITAMIN D3 PO), Take 2,000 Units by mouth daily, Disp: , Rfl:     vitamin B-2 (RIBOFLAVIN) 100 MG TABS tablet, Take 1 tablet by mouth daily, Disp: 30 tablet, Rfl: 3    ALLERGIES:   Allergies   Allergen Reactions    Latex Other (See Comments)     Redness and swelling    Banana Anaphylaxis     Lips swell    Banana Extract Allergy Skin Test Anaphylaxis    Avocado     Coconut Fatty Acids     Coconut Oil     Kiwi Extract     [place] Flavor [Flavoring Agent]     Papaya Derivatives     Tape [Adhesive Tape] Other (See Comments)     Plastic tape causes skin irritation    Maxalt [Rizatriptan Benzoate] Nausea And Vomiting       FAMILY HISTORY:       Problem Relation Age of Onset    Depression Mother    Hernando Chow Diabetes Father     High Blood Pressure Father     High Cholesterol Father     Obesity Father     Other Father     High Blood Pressure Paternal Grandmother     Breast Cancer Paternal Grandmother     High Cholesterol Paternal Grandmother     Asthma Brother     Other Brother         Peanut allergies    Depression Brother     No Known Problems Maternal Grandmother     No Known Problems Maternal Grandfather     No Known Problems Paternal Grandfather          SOCIAL HISTORY:   Social History     Socioeconomic History    Marital status: Single     Spouse name: Not on file    Number of children: Not on file    Years of education: Not on file    Highest education level: Not on file   Occupational History    Occupation: student     Employer: NONE   Social Needs    Financial resource strain: Not on file    Food insecurity     Worry: Not on file     Inability: Not on file    Transportation needs     Medical: Not on file     Non-medical: Not on file   Tobacco Use    Smoking status: Current Every Day Smoker     Packs/day: 1.00     Years: 17.00     Pack years: 17.00     Types: Cigarettes     Start date: 6/17/2004    Smokeless tobacco: Never Used   Substance and Sexual Activity    Alcohol use: Yes     Alcohol/week: 0.0 standard drinks     Frequency: Monthly or less     Comment: socially    Drug use: Yes     Types: Marijuana     Comment: Patient states smoking to help her eat    Sexual activity: Not Currently     Birth control/protection: I.U.D.    Lifestyle    Physical activity     Days per week: Not on file     Minutes per session: Not on file    Stress: Not on file   Relationships    Social connections     Talks on phone: Not on file     Gets together: Not on file     Attends Hindu service: Not on file     Active member of club or organization: Not on file     Attends meetings of clubs or organizations: Not on file     Relationship status: Not on file    Intimate partner violence     Fear of has no cervical adenopathy. Neurological: Patient is alert and oriented to person, place, and time. Psychiatric: Patient has a normal mood and affect. Patient behavior is normal.       LABORATORY DATA: Reviewed  Lab Results   Component Value Date    WBC 10.8 06/01/2020    HGB 15.7 (H) 06/01/2020    HCT 49.6 (H) 06/01/2020    MCV 89.4 06/01/2020     06/01/2020     06/01/2020    K 4.5 06/01/2020     06/01/2020    CO2 22 06/01/2020    BUN 11 06/01/2020    CREATININE 0.78 06/01/2020    LABALBU 4.5 06/01/2020    BILITOT 0.45 06/01/2020    ALKPHOS 51 06/01/2020    AST 14 06/01/2020    ALT 10 06/01/2020         Lab Results   Component Value Date    RBC 5.55 (H) 06/01/2020    HGB 15.7 (H) 06/01/2020    MCV 89.4 06/01/2020    MCH 28.3 06/01/2020    MCHC 31.7 06/01/2020    RDW 13.2 06/01/2020    MPV 10.3 06/01/2020    BASOPCT 1 06/01/2020    LYMPHSABS 2.90 06/01/2020    MONOSABS 0.72 06/01/2020    NEUTROABS 6.95 06/01/2020    EOSABS 0.11 06/01/2020    BASOSABS 0.05 06/01/2020         DIAGNOSTIC TESTING:     No results found. IMPRESSION:Ms. Cortes Grounds a 38 y.o. female with a past history remarkable for diarrhea, referred for evaluation of of the symptoms. Patient was identified to have multiple food allergens (potential) which include pepper, carrots, grapes, lettuce, navy bean, rye, soybean, tomato, wheat, potato, oat, rice, corn, and cabbage. After avoiding all of these potential food allergens patient has reported significant GI improvement with no further episodes of diarrhea or GI discomfort. Patient was referred to an allergist to identify true allergens and to potentially reintroduce sesame these potential allergens into her normal dietary pattern. PLAN:    1) Possible food allergy related to the significant positive food comprehensive panel- patient has been avoiding her potential dietary triggers and since noted significant improvement In her GI symptoms.   Clinically

## 2020-09-01 NOTE — PROGRESS NOTES
History and Physical  [place] office  Blaine Habermann  2020              38 y.o. Chief Complaint   Patient presents with   [facility] Gynecologic Exam     NEW patient       No LMP recorded. Patient has had an implant. Primary Care Physician: Dalia PETIT PA-C      HPI : Blaine Habermann is a 38 y.o. female     Patient states she had Sherrel Broad placed shortly after birth of her last child in 2016 - she is at 3 year larissa and due to have Sherrel Broad replaced next year.   No menses with IUD  Would like to have STD cultures with annual exam  No complaints or concerns today   ________________________________________________________________________  OB History    Para Term  AB Living   2 2 2 0 0 2   SAB TAB Ectopic Molar Multiple Live Births   0 0 0 0 0 0      # Outcome Date GA Lbr Andrew/2nd Weight Sex Delivery Anes PTL Lv   2 Term      Vag-Spont      1 Term      Vag-Spont        Past Medical History:   Diagnosis Date    Acid reflux     Anxiety     Fibromyalgia     Headache     migraines    HPV (human papilloma virus) anogenital infection     Insomnia     Movement disorder     spasms, B&B    Neuropathy     left hands, feet    Rheumatic fever     Spinal stenosis     Wears glasses                                                                    Past Surgical History:   Procedure Laterality Date    CHOLECYSTECTOMY      DENTAL SURGERY      removed some teeth at age 1   [facility] INTRAUTERINE DEVICE INSERTION  2017    TONSILLECTOMY AND ADENOIDECTOMY Bilateral 2016     Family History   Problem Relation Age of Onset    Depression Mother     Diabetes Father     High Blood Pressure Father     High Cholesterol Father     Obesity Father     Other Father     High Blood Pressure Paternal Grandmother     Breast Cancer Paternal Grandmother     High Cholesterol Paternal Grandmother     Asthma Brother     Other Brother         Peanut allergies    Depression Brother  No Known Problems Maternal Grandmother     No Known Problems Maternal Grandfather     No Known Problems Paternal Grandfather      Social History     Socioeconomic History    Marital status: Single     Spouse name: Not on file    Number of children: Not on file    Years of education: Not on file    Highest education level: Not on file   Occupational History    Occupation: student     Employer: NONE   Social Needs    Financial resource strain: Not on file    Food insecurity     Worry: Not on file     Inability: Not on file    Transportation needs     Medical: Not on file     Non-medical: Not on file   Tobacco Use    Smoking status: Current Every Day Smoker     Packs/day: 1.00     Years: 17.00     Pack years: 17.00     Types: Cigarettes     Start date: 6/17/2004    Smokeless tobacco: Never Used   Substance and Sexual Activity    Alcohol use: Yes     Alcohol/week: 0.0 standard drinks     Frequency: Monthly or less     Comment: socially    Drug use: Yes     Types: Marijuana     Comment: Patient states smoking to help her eat    Sexual activity: Not Currently     Birth control/protection: I.U.D.    Lifestyle    Physical activity     Days per week: Not on file     Minutes per session: Not on file    Stress: Not on file   Relationships    Social connections     Talks on phone: Not on file     Gets together: Not on file     Attends Spiritism service: Not on file     Active member of club or organization: Not on file     Attends meetings of clubs or organizations: Not on file     Relationship status: Not on file    Intimate partner violence     Fear of current or ex partner: Not on file     Emotionally abused: Not on file     Physically abused: Not on file     Forced sexual activity: Not on file   Other Topics Concern    Not on file   Social History Narrative    Not on file       MEDICATIONS:  Current Outpatient Medications   Medication Sig Dispense Refill    ondansetron (ZOFRAN) 4 MG tablet Take 1 tablet by mouth daily as needed for Nausea or Vomiting 30 tablet 0    hyoscyamine (ANASPAZ;LEVSIN) 125 MCG tablet Take 1 tablet by mouth every 4 hours as needed for Cramping 180 tablet 3    Wheat Dextrin (BENEFIBER) POWD Take 4 g by mouth 3 times daily (with meals) 30 Can 0    topiramate (TOPAMAX) 25 MG tablet 50mg AM, 25mg  tablet 3    famotidine (PEPCID) 40 MG tablet Take 1 tablet by mouth every evening 30 tablet 3    Cholecalciferol (VITAMIN D3 PO) Take 2,000 Units by mouth daily      vitamin B-2 (RIBOFLAVIN) 100 MG TABS tablet Take 1 tablet by mouth daily 30 tablet 3     No current facility-administered medications for this visit. ALLERGIES:  Allergies as of 09/01/2020 - Review Complete 09/01/2020   Allergen Reaction Noted    Latex Other (See Comments) 12/19/2014    Banana Anaphylaxis 09/20/2019    Banana extract allergy skin test Anaphylaxis 09/20/2019    Avocado  04/15/2018    Coconut fatty acids  09/20/2019    Coconut oil  04/15/2018    Kiwi extract  04/15/2018    Garret flavor [flavoring agent]  09/20/2019    Papaya derivatives  09/20/2019    Tape [adhesive tape] Other (See Comments) 01/07/2016    Maxalt [rizatriptan benzoate] Nausea And Vomiting 11/08/2019   Health Maintenance:  Health Maintenance Due   Topic Date Due    Cervical cancer screen  05/26/2018    Flu vaccine (1) 09/01/2020       Review Of Systems (11 point):  Constitutional: No fever, chills or malaise;  No weight change or fatigue  Head and Eyes: No vision, Headache, Dizziness or trauma in last 12 months  ENT ROS: No hearing, Tinnitis, sinus or taste problems  Hematological and Lymphatic ROS:No Lymphoma, Von Willebrand's, Hemophillia or Bleeding History  Psych ROS: No Depression, Homicidal thoughts,suicidal thoughts, or anxiety  Breast ROS: No prior breast abnormalities or lumps  Respiratory ROS: No SOB, Pneumoniae,Cough, or Pulmonary Embolism History  Cardiovascular ROS: No Chest Pain with Exertion, Palpitations, Syncope, Edema, Arrhythmia  Gastrointestinal ROS: No Indigestion, Heartburn, Nausea, vomiting, Diarrhea, Constipation,or Bowel Changes; No Bloody Stools or melena  Genito-Urinary ROS: No Dysuria, Hematuria or Nocturia. No Urinary Incontinence or Vaginal Discharge  Musculoskeletal ROS: No Arthralgia, Arthritis,Gout,Osteoporosis or Rheumatism  Neurological ROS: No CVA, Migraines, Epilepsy, Seizure Hx, or Limb Weakness  Dermatological ROS: No Rash, Itching, Hives, Mole Changes or Cancer                                                                                                                                                                                                                                  PHYSICAL Exam:     Constitutional:  Vitals:    09/01/20 1253   BP: 112/62   Site: Right Upper Arm   Position: Sitting   Cuff Size: Small Adult   Weight: 135 lb (61.2 kg)   Height: 5' 6\" (1.676 m)         General Appearance: This  is a well Developed, well Nourished, well groomed female. Skin:  There was a Normal Inspection of the skin without rashes or lesions. Extremities: The patients extremities were without calf tenderness, edema, or varicosities. Abdomen: The abdomen was soft and non-tender. There were good bowel sounds in all quadrants and there was no guarding, rebound or rigidity. Psych: The patient had a normal Orientation to: Time, Place, Person, and Situation  Breast:  (Chest)  normal appearance, no masses or tenderness  Pelvic Exam:  Vulva and vagina appear normal. Bimanual exam reveals normal uterus and adnexa.  + IUD strings at external OS  Musculosk:  Normal Gait and station was noted. ASSESSMENT:      38 y.o. Annual   Diagnosis Orders   1.  Encounter for annual routine gynecological examination  PAP SMEAR          Chief Complaint   Patient presents with    Gynecologic Exam     NEW patient          Past Medical History:   Diagnosis Date    Acid reflux     Anxiety  Fibromyalgia     Headache     migraines    HPV (human papilloma virus) anogenital infection     Insomnia     Movement disorder     spasms, B&B    Neuropathy     left hands, feet    Rheumatic fever     Spinal stenosis     Wears glasses          Patient Active Problem List    Diagnosis Date Noted    Weight loss due to medication 04/24/2020    Tobacco abuse 12/04/2018    Lumbar radicular pain 09/07/2018    Fibromyalgia 07/06/2018    Osteoarthritis 07/06/2018     Early onset per dr Karen Valdez [facility]um [facility]       Major depressive disorder, recurrent, moderate ([address] 07/06/2018    Hypokalemia 07/06/2018    Numbness and tingling of both lower extremities 04/24/2018    Chronic bilateral low back pain with bilateral sciatica 12/19/2017    Numbness and tingling 12/19/2017    Hemangioma of liver 10/24/2017    Body mass index (BMI) of 25.0 to 29.9 06/02/2017    Spinal stenosis     Chronic low back pain with bilateral sciatica 05/05/2017    Psychophysiological insomnia 03/30/2017    Fatigue 03/30/2017    Mycoplasma infection 09/04/2015    HPV (human papilloma virus) anogenital infection     Abdominal pain 03/27/2015    Nasal congestion 03/27/2015    Post partum depression 03/27/2015    Anxiety             PLAN:  Annual exam performed  Cervical cytology collected  Cultures collected  Pap smear collected       Orders Placed This Encounter   Procedures    PAP SMEAR     Patient History:    No LMP recorded. Patient has had an implant.   OBGYN Status: Implant  Past Surgical History:  No date: CHOLECYSTECTOMY  No date: DENTAL SURGERY      Comment:  removed some teeth at age 1  89: INTRAUTERINE DEVICE INSERTION  1/7/2016: TONSILLECTOMY AND ADENOIDECTOMY; Bilateral      Social History    Tobacco Use      Smoking status: Current Every Day Smoker        Packs/day: 1.00        Years: 17.00        Pack years: 16        Types: Cigarettes        Start date: 6/17/2004      Smokeless tobacco: Never Used       Standing Status:   Future     Standing Expiration Date:   9/2/2021     Order Specific Question:   Collection Type     Answer: Thin Prep     Order Specific Question:   Prior Abnormal Pap Test     Answer:   No     Order Specific Question:   Screening or Diagnostic     Answer:   Screening     Order Specific Question:   HPV Requested?      Answer:   Yes     Order Specific Question:   High Risk Patient     Answer:   N/A

## 2020-09-02 LAB
DIRECT EXAM: ABNORMAL
Lab: ABNORMAL
SPECIMEN DESCRIPTION: ABNORMAL

## 2020-09-02 RX ORDER — METRONIDAZOLE 500 MG/1
500 TABLET ORAL 2 TIMES DAILY
Qty: 14 TABLET | Refills: 0 | Status: SHIPPED | OUTPATIENT
Start: 2020-09-02 | End: 2020-09-09

## 2020-09-03 ENCOUNTER — TELEPHONE (OUTPATIENT)
Dept: OBGYN CLINIC | Age: 35
End: 2020-09-03

## 2020-09-03 LAB
C TRACH DNA GENITAL QL NAA+PROBE: NEGATIVE
N. GONORRHOEAE DNA: NEGATIVE
SPECIMEN DESCRIPTION: NORMAL

## 2020-09-04 LAB
HPV SAMPLE: ABNORMAL
HPV, GENOTYPE 16: NOT DETECTED
HPV, GENOTYPE 18: NOT DETECTED
HPV, HIGH RISK OTHER: DETECTED
HPV, INTERPRETATION: ABNORMAL
SPECIMEN DESCRIPTION: ABNORMAL

## 2020-09-09 LAB — CYTOLOGY REPORT: NORMAL

## 2020-09-29 ENCOUNTER — HOSPITAL ENCOUNTER (OUTPATIENT)
Age: 35
Setting detail: SPECIMEN
Discharge: HOME | End: 2020-09-29
Payer: COMMERCIAL

## 2020-09-29 ENCOUNTER — PROCEDURE VISIT (OUTPATIENT)
Dept: OBGYN CLINIC | Age: 35
End: 2020-09-29
Payer: COMMERCIAL

## 2020-09-29 ENCOUNTER — OFFICE VISIT (OUTPATIENT)
Dept: NEUROLOGY | Age: 35
End: 2020-09-29
Payer: COMMERCIAL

## 2020-09-29 VITALS
RESPIRATION RATE: 16 BRPM | HEIGHT: 66 IN | WEIGHT: 135 LBS | TEMPERATURE: 97.7 F | DIASTOLIC BLOOD PRESSURE: 62 MMHG | BODY MASS INDEX: 21.69 KG/M2 | SYSTOLIC BLOOD PRESSURE: 114 MMHG

## 2020-09-29 PROCEDURE — 57454 BX/CURETT OF CERVIX W/SCOPE: CPT | Performed by: OBSTETRICS & GYNECOLOGY

## 2020-09-29 PROCEDURE — 95910 NRV CNDJ TEST 7-8 STUDIES: CPT | Performed by: PSYCHIATRY & NEUROLOGY

## 2020-09-29 PROCEDURE — 95886 MUSC TEST DONE W/N TEST COMP: CPT | Performed by: PSYCHIATRY & NEUROLOGY

## 2020-09-29 PROCEDURE — 81025 URINE PREGNANCY TEST: CPT | Performed by: OBSTETRICS & GYNECOLOGY

## 2020-10-01 LAB — SURGICAL PATHOLOGY REPORT: NORMAL

## 2020-10-01 NOTE — PROGRESS NOTES
COLPOSCOPY PROCEDURE FORM    9/29/2020  Alicja Hernandez Josephlotusnachos  No LMP recorded. Patient has had an implant.  38 y.o. Social History     Tobacco Use   Smoking Status Current Every Day Smoker    Packs/day: 1.00    Years: 17.00    Pack years: 17.00    Types: Cigarettes    Start date: 6/17/2004   Smokeless Tobacco Never Used         INDICATIONS: LGSIL + HR HPV                 COLPOSCOPIC EXAMINATION:                Blood pressure 114/62, temperature 97.7 °F (36.5 °C), temperature source Temporal, resp. rate 16, height 5' 6\" (1.676 m), weight 135 lb (61.2 kg), not currently breastfeeding. Gross observations: acetyl white changes at 11-12 o'clock position of cervix  Satisfactory: Yes   FINDINGS: see media flowsheet for same day procedure      ECC performed:  Yes    Lugols Iodine Applied:   Yes       IMPRESSIONS: awaiting pathology  Biopsy sites: 11-12, ECC    PLAN: The patient was given formal restriction guidelines. She is to RTO in 2 weeks. FOR PATIENTS WHO UNDERWENT A BIOPSY-They were instructed to report any increase in vaginal bleeding, discharge, or any temperature more than 100.4 F. Strict pelvic rest precautions were reviewed with lifting restrictions.

## 2020-10-02 ENCOUNTER — OFFICE VISIT (OUTPATIENT)
Dept: NEUROLOGY | Age: 35
End: 2020-10-02
Payer: COMMERCIAL

## 2020-10-02 VITALS
BODY MASS INDEX: 20.89 KG/M2 | WEIGHT: 130 LBS | DIASTOLIC BLOOD PRESSURE: 72 MMHG | SYSTOLIC BLOOD PRESSURE: 117 MMHG | HEIGHT: 66 IN | HEART RATE: 87 BPM

## 2020-10-02 PROCEDURE — 4004F PT TOBACCO SCREEN RCVD TLK: CPT | Performed by: PSYCHIATRY & NEUROLOGY

## 2020-10-02 PROCEDURE — 99214 OFFICE O/P EST MOD 30 MIN: CPT | Performed by: PSYCHIATRY & NEUROLOGY

## 2020-10-02 PROCEDURE — G8427 DOCREV CUR MEDS BY ELIG CLIN: HCPCS | Performed by: PSYCHIATRY & NEUROLOGY

## 2020-10-02 PROCEDURE — G8484 FLU IMMUNIZE NO ADMIN: HCPCS | Performed by: PSYCHIATRY & NEUROLOGY

## 2020-10-02 PROCEDURE — G8420 CALC BMI NORM PARAMETERS: HCPCS | Performed by: PSYCHIATRY & NEUROLOGY

## 2020-10-02 NOTE — PROGRESS NOTES
Nerve F min Ref. ms ms   L Median - APB 24.8 ? 31.0   L Ulnar - ADM 26.5 ?32.0   L Peroneal - EDB 46.3 ?56.0   L Tibial - AH 50.3 ? 56.0                 EMG Summary Table     Spontaneous MUAP Recruitment    IA Fib PSW Fasc H.F. Amp Dur. PPP Pattern   L. FIRST D INTEROSS N None None None None N N N N   L. EXT IND PROP. N None None None None N N N N   L. Pronator teres N None None None None N N N N   L. BICEPS N None None None None N N N N   L. DELTOID N None None None None N N N N   L. TRICEPS N None None None None N N N N   L. CERV PSP (U) N None None None        L. CERV PSP (M) N None None None        L. CERV PSP (L) N None None None          EMG Summary Table     Spontaneous MUAP Recruitment    IA Fib PSW Fasc H.F. Amp Dur. PPP Pattern   L. TIBIALIS ANTERIOR N None None None None N N N N   L. GASTROC. MEDIALIS N None None None None N N N N   L. VASTUS MEDIALIS N None None None None N N N N   L. GLUTEUS MEDIUS N None None None None N N N N   L. ILIOPSOAS N None None None None N N N N   L. LUMBAR PSP (U) N None None None        L. LUMBAR PSP (M) N None None None        L. LUMBAR PSP (L) N None None None          Interpretation: Left median and ulnar orthodromic transcarpal sensory studies showed normal peak latencies amplitude and sensory nerve conduction velocities. Left superficial peroneal sensory study showed normal peak latency, amplitude and sensory nerve conduction velocity. Left median, ulnar, peroneal and tibial motor studies showed normal DML's, C map amplitudes and motor nerve conduction velocity. Monopolar EMG study of the selected muscles revealed no evidence of active denervation or chronic reinnervation. Full interference pattern was seen. Conclusion: This is a normal electrophysiological study. There was no evidence of left cervical or lumbosacral radiculopathy. Clinical correlation recommended.       ------------------------------  Lissa Hernandez MD

## 2020-10-02 NOTE — PATIENT INSTRUCTIONS
1. Continue topamax at 50mg morning, 25mg evening for now  2. Make sure sleep 7-8 hours.    3. Keep headache log    Return in 5-6 months    Olga Rogers MD, MS

## 2020-10-02 NOTE — PROGRESS NOTES
it would not happen. Her sleep is not the best, she may sleep 5-6 hours only per night, she goes to sleep around midnight, gets up between 5:30-6AM, she has no problem falling asleep or maintaining sleep, just did not make time for herself getting sleep. Tingling/numbness still presents, in left arm/leg, she had EMG/NCV 2 days ago, reported normal study, no evidence of left cervical or lumbosacral radiculopathy. Cervical MRI on 9/27/2020 found disc osteophyte complex eccentric to the left at C5-6 and C6-7 without significant canal stenosis or foraminal narrowing.        Initial clinical visit on 9/20/2019  HA  Onset: at age of [age]years old, headache free from [age], restarted HA one month ago  Aura/warning: seeing white light, color in between also visual filed narrowed from one side,   Features: starts from temporal or back of head, pressure, pounding, 8-9/10, photophobia/phonophobia, nauseated, occasional vomiting like this morning, HA usual lasted 1-4 hours, but the last one lasted for two weeks. Can be associated with weather change, poor sleep; not related to food, mood; when FELIZ came, she had to rest.   She also reported \"regular headache\" which is located in bifrontal, no photophobia/phonobia, 4-5/10  Risk factors: mother has HA. She slipped once on ice hurt head, not sure about LOC,   Social: single, lives with family, has two children of 9 and 2 year ago, 0.5ppd smoking, social drinking once a month, no drugs, sleep 5-7 hours per night. 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 Last seen therapist 2 years ago. Other medical issues: spinal stenosis, fibromyalgia, arthritis, since Jan, has lost 30-40 lbs, unintentional. Appetite not great. Bowel and urination not regular.  She even had one time fecal incontinence (last week)  Medications: Started elavil 25mg since two days ago, makes her very sleep; gabapentin on and 2. \"FREDDY IZQUIERDO, 12:00\" 0.4 x 0.3 x 0.1 cm tan-white fragment. Entirely 1cs. rh tm      Microscopic Description  1, 2. Microscopic examination performed. SURGICAL PATHOLOGY CONSULTATION       Patient Name: Lauri Hernandez [facility] Rec: [id number]  Path Number: [id number]    [address] [facility],[address] [facility]  ANATOMIC PATHOLOGY  [address] [place] [address], [address]. [place], [address]  [phone]  Fax: [phone]      except those listed in the interval history.        Diagnosis Date    Acid reflux     Allergic reaction to allergy skin test 09/2020    legumes, wheat, peanuts almonds    Anxiety     Fibromyalgia     Headache     migraines    HPV (human papilloma virus) anogenital infection     Insomnia     Movement disorder     spasms, B&B    Neuropathy     left hands, feet    Rheumatic fever     Spinal stenosis     Wears glasses         ALLERGIES:   Allergies   Allergen Reactions    Latex Other (See Comments)     Redness and swelling    Banana Anaphylaxis     Lips swell    Banana Extract Allergy Skin Test Anaphylaxis    Fraser (Diagnostic)     Avocado     Buckwheat     Coconut Fatty Acids     Coconut Oil     Kiwi Extract     [place] Flavor [Flavoring Agent]     Other      Green peas, lima beans     Papaya Derivatives     Peanut (Diagnostic)     Tape [Adhesive Tape] Other (See Comments)     Plastic tape causes skin irritation    Wheat Bran     Maxalt [Rizatriptan Benzoate] Nausea And Vomiting       MEDICATIONS:   Current Outpatient Medications   Medication Sig Dispense Refill    hyoscyamine (ANASPAZ;LEVSIN) 125 MCG tablet Take 1 tablet by mouth every 4 hours as needed for Cramping 180 tablet 3    topiramate (TOPAMAX) 25 MG tablet 50mg AM, 25mg  tablet 3    ondansetron (ZOFRAN) 4 MG tablet Take 1 tablet by mouth daily as needed for Nausea or Vomiting (Patient not taking: Reported on 10/2/2020) 30 tablet 0    Wheat Dextrin (BENEFIBER) POWD Take 4 g by mouth 3 times daily (with meals) (Patient not taking: Reported on 10/2/2020) 30 Can 0    famotidine (PEPCID) 40 MG tablet Take 1 tablet by mouth every evening (Patient not taking: Reported on 10/2/2020) 30 tablet 3    Cholecalciferol (VITAMIN D3 PO) Take 2,000 Units by mouth daily      vitamin B-2 (RIBOFLAVIN) 100 MG TABS tablet Take 1 tablet by mouth daily (Patient not taking: Reported on 10/2/2020) 30 tablet 3     No current facility-administered medications for this visit. LABS & TESTS:      Lab Results   Component Value Date    WBC 10.8 06/01/2020    HGB 15.7 (H) 06/01/2020    HCT 49.6 (H) 06/01/2020    MCV 89.4 06/01/2020     06/01/2020       REVIEW OF SYSTEMS:       All items selected indicate a positive finding. Those items not selected are negative.   Constitutional [x] Weight loss/gain   [x] Fatigue  [] Fever/Chills   HEENT [] Hearing Loss  [] Visual Disturbance  [] Tinnitus  [] Eye pain   Respiratory [] Shortness of Breath  [] Cough  [] Snoring   Cardiovascular [] Chest Pain  [] Palpitations  [x] Lightheaded   GI [x] Constipation  [x] Diarrhea  [] Swallowing change  [x] Nausea/vomiting    [] Urinary Frequency  [x] Urinary Urgency   Musculoskeletal [x] Neck pain  [x] Back pain  [x] Muscle pain  [] Restless legs   Dermatologic [] Skin changes   Neurologic [] Memory loss/confusion  [] Seizures  [] Trouble walking or imbalance  [] Dizziness  [] Sleep disturbance  [x] Weakness  [x] Numbness  [] Tremors  [] Speech Difficulty  [x] Headaches  [x] Light Sensitivity  [x] Sound Sensitivity   Endocrinology [x]Excessive thirst  [x]Excessive hunger   Psychiatric [x] Anxiety/Depression  [] Hallucination   Allergy/immunology []Hives/environmental allergies   Hematologic/lymph [] Abnormal bleeding  [] Abnormal bruising       VITALS  /72 (Site: Right Upper Arm, Position: Sitting)   Pulse 87   Ht 5' 6\" (1.676 m)   Wt 130 lb (59 kg)   BMI 20.98 kg/m²        PHYSICAL EXAMINATIONS:     General

## 2020-10-20 ENCOUNTER — OFFICE VISIT (OUTPATIENT)
Dept: OBGYN CLINIC | Age: 35
End: 2020-10-20
Payer: COMMERCIAL

## 2020-10-20 VITALS — DIASTOLIC BLOOD PRESSURE: 60 MMHG | SYSTOLIC BLOOD PRESSURE: 120 MMHG

## 2020-10-20 PROCEDURE — 99212 OFFICE O/P EST SF 10 MIN: CPT | Performed by: OBSTETRICS & GYNECOLOGY

## 2020-10-20 PROCEDURE — G8427 DOCREV CUR MEDS BY ELIG CLIN: HCPCS | Performed by: OBSTETRICS & GYNECOLOGY

## 2020-10-20 PROCEDURE — G8420 CALC BMI NORM PARAMETERS: HCPCS | Performed by: OBSTETRICS & GYNECOLOGY

## 2020-10-20 PROCEDURE — 4004F PT TOBACCO SCREEN RCVD TLK: CPT | Performed by: OBSTETRICS & GYNECOLOGY

## 2020-10-20 PROCEDURE — G8484 FLU IMMUNIZE NO ADMIN: HCPCS | Performed by: OBSTETRICS & GYNECOLOGY

## 2020-10-20 NOTE — PROGRESS NOTES
[facility]  [facility]    YOB: 1985      HPI:  Guicho Coelho is a 38 y.o. female      LGSIL with HR HPV pap smear  Colposcopy with ASAF I on bx and ECC    Patient presents to discuss results of colposcopy and further management if needed.   No complaints or issues today       OB History    Para Term  AB Living   2 2 2 0 0 2   SAB TAB Ectopic Molar Multiple Live Births   0 0 0 0 0 0      # Outcome Date GA Lbr Andrew/2nd Weight Sex Delivery Anes PTL Lv   2 Term      Vag-Spont      1 Term      Vag-Spont          Past Medical History:   Diagnosis Date    Acid reflux     Allergic reaction to allergy skin test 2020    legumes, wheat, peanuts almonds    Anxiety     Fibromyalgia     Headache     migraines    HPV (human papilloma virus) anogenital infection     Insomnia     Movement disorder     spasms, B&B    Neuropathy     left hands, feet    Rheumatic fever     Spinal stenosis     Wears glasses        Past Surgical History:   Procedure Laterality Date    CHOLECYSTECTOMY      DENTAL SURGERY      removed some teeth at age 1   [employer] INTRAUTERINE DEVICE INSERTION  2017    TONSILLECTOMY AND ADENOIDECTOMY Bilateral 2016       Family History   Problem Relation Age of Onset    Depression Mother     Diabetes Father     High Blood Pressure Father     High Cholesterol Father     Obesity Father     Other Father     High Blood Pressure Paternal Grandmother     Breast Cancer Paternal Grandmother     High Cholesterol Paternal Grandmother     Asthma Brother     Other Brother         Peanut allergies    Depression Brother     No Known Problems Maternal Grandmother     No Known Problems Maternal Grandfather     No Known Problems Paternal Grandfather        Social History     Socioeconomic History    Marital status: Single     Spouse name: Not on file    Number of children: Not on file    Years of education: Not on file    Highest education level: Not on file   Occupational History    Occupation: student     Employer: NONE   Social Needs    Financial resource strain: Not on file    Food insecurity     Worry: Not on file     Inability: Not on file   [employer] needs     Medical: Not on file     Non-medical: Not on file   Tobacco Use    Smoking status: Current Every Day Smoker     Packs/day: 0.50     Years: 17.00     Pack years: 8.50     Types: Cigarettes     Start date: 6/17/2004    Smokeless tobacco: Never Used   Substance and Sexual Activity    Alcohol use: Yes     Alcohol/week: 0.0 standard drinks     Frequency: Monthly or less     Comment: socially    Drug use: Yes     Types: Marijuana     Comment: Patient states smoking to help her eat    Sexual activity: Not Currently     Birth control/protection: I.U.D.    Lifestyle    Physical activity     Days per week: Not on file     Minutes per session: Not on file    Stress: Not on file   Relationships    Social connections     Talks on phone: Not on file     Gets together: Not on file     Attends [employer] service: Not on file     Active member of club or organization: Not on file     Attends meetings of clubs or organizations: Not on file     Relationship status: Not on file    Intimate partner violence     Fear of current or ex partner: Not on file     Emotionally abused: Not on file     Physically abused: Not on file     Forced sexual activity: Not on file   Other Topics Concern    Not on file   Social History Narrative    Not on file         MEDICATIONS:  Current Outpatient Medications   Medication Sig Dispense Refill    ondansetron (ZOFRAN) 4 MG tablet Take 1 tablet by mouth daily as needed for Nausea or Vomiting (Patient not taking: Reported on 10/2/2020) 30 tablet 0    hyoscyamine (ANASPAZ;LEVSIN) 125 MCG tablet Take 1 tablet by mouth every 4 hours as needed for Cramping 180 tablet 3    Wheat Dextrin (BENEFIBER) POWD Take 4 g by mouth 3 times daily (with meals) (Patient not taking: Reported on 10/2/2020) 30 Can 0    topiramate (TOPAMAX) 25 MG tablet 50mg AM, 25mg  tablet 3    famotidine (PEPCID) 40 MG tablet Take 1 tablet by mouth every evening (Patient not taking: Reported on 10/2/2020) 30 tablet 3    Cholecalciferol (VITAMIN D3 PO) Take 2,000 Units by mouth daily      vitamin B-2 (RIBOFLAVIN) 100 MG TABS tablet Take 1 tablet by mouth daily (Patient not taking: Reported on 10/2/2020) 30 tablet 3     No current facility-administered medications for this visit. ALLERGIES:  Allergies as of 10/20/2020 - Review Complete 10/20/2020   Allergen Reaction Noted    Latex Other (See Comments) 12/19/2014    Banana Anaphylaxis 09/20/2019    Banana extract allergy skin test Anaphylaxis 09/20/2019    [place] (diagnostic)  10/02/2020    Avocado  04/15/2018    Buckwheat  10/02/2020    Coconut fatty acids  09/20/2019    Coconut oil  04/15/2018    Kiwi extract  04/15/2018    Garret flavor [flavoring agent]  09/20/2019    Other  10/02/2020    Papaya derivatives  09/20/2019    Peanut (diagnostic)  10/02/2020    Tape Henretta Perfect tape] Other (See Comments) 01/07/2016    Wheat bran  10/02/2020    Maxalt [rizatriptan benzoate] Nausea And Vomiting 11/08/2019       Review Of Systems (11 point):  Constitutional: No fever, chills or malaise;  No weight change or fatigue  Head and Eyes: No vision, Headache, Dizziness or trauma in last 12 months  ENT ROS: No hearing, Tinnitis, sinus or taste problems  Hematological and Lymphatic ROS:No Lymphoma, Von Willebrand's, Hemophillia or Bleeding History  Psych ROS: No Depression, Homicidal thoughts,suicidal thoughts, or anxiety  Breast ROS: No prior breast abnormalities or lumps  Respiratory ROS: No SOB, Pneumoniae,Cough, or Pulmonary Embolism History  Cardiovascular ROS: No Chest Pain with Exertion, Palpitations, Syncope, Edema, Arrhythmia  Gastrointestinal ROS: No Indigestion, Heartburn, Nausea, vomiting, Diarrhea, Constipation,or Bowel Changes; No Bloody Stools or melena  Genito-Urinary ROS: No Dysuria, Hematuria or Nocturia. No Urinary Incontinence or Vaginal Discharge  Musculoskeletal ROS: No Arthralgia, Arthritis,Gout,Osteoporosis or Rheumatism  Neurological ROS: No CVA, Migraines, Epilepsy, Seizure Hx, or Limb Weakness  Dermatological ROS: No Rash, Itching, Hives, Mole Changes or Cancer          Blood pressure 120/60, not currently breastfeeding. Abdomen: Soft non-tender; good bowel sounds. No guarding, rebound or rigidity. No CVA tenderness bilaterally. Extremities: No calf tenderness, DTR 2/4, and No edema bilaterally    Pelvic: Exam deferred. Diagnostics:  No results found. Lab Results:  Results for orders placed or performed during the hospital encounter of 09/29/20   Surgical Pathology   Result Value Ref Range    Surgical Pathology Report       -- Diagnosis --  1. ENDOCERVICAL CURETTINGS:  LOW-GRADE SQUAMOUS INTRAEPITHELIAL  LESION (ASAF-1). 2.  CERVICAL BIOPSY, 12:00:  LOW-GRADE SQUAMOUS INTRAEPITHELIAL LESION  (ASAF-1). KYUNG Jimenez  **Electronically Signed Out**         ajb/10/1/2020       Clinical Information  Pre-op Diagnosis:  LGSIL, HPF IN FEMALE    Operative Findings:  ECC; 12:00    Source of Specimen  1: ECC  2: CERVICAL BX 12 O'CLOCK    Gross Description  1. \"FREDDY IZQUIERDO, ECC\" Mucinous fragments, 2.6 x 0.7 x 0.1 cm  in aggregate. Entirely 1cs. 2. \"FREDDY PEARCEROTS, 12:00\" 0.4 x 0.3 x 0.1 cm tan-white fragment. Entirely 1cs. rh tm      Microscopic Description  1, 2. Microscopic examination performed. SURGICAL PATHOLOGY CONSULTATION       Patient Name: Burgess Stewart Med Rec: [id number]  Path Number: [id number]    [address] [facility],[address] [facility]  ANATOMIC PATHOLOGY  [address] [place] [address], [address]. [place], [address]  [phone]  Fax: [phone]           Assessment:   Diagnosis Orders   1. LGSIL of cervix of undetermined significance     2.  HPV in female     3. Dysplasia of cervix, low grade (ASAF 1)       Patient Active Problem List    Diagnosis Date Noted    Weight loss due to medication 04/24/2020    Tobacco abuse 12/04/2018    Lumbar radicular pain 09/07/2018    Fibromyalgia 07/06/2018    Osteoarthritis 07/06/2018     Early onset per dr Cruz Phipps rheum [facility]       Major depressive disorder, recurrent, moderate ([address] 07/06/2018    Hypokalemia 07/06/2018    Numbness and tingling of both lower extremities 04/24/2018    Chronic bilateral low back pain with bilateral sciatica 12/19/2017    Numbness and tingling 12/19/2017    Hemangioma of liver 10/24/2017    Body mass index (BMI) of 25.0 to 29.9 06/02/2017    Spinal stenosis     Chronic low back pain with bilateral sciatica 05/05/2017    Psychophysiological insomnia 03/30/2017    Fatigue 03/30/2017    Mycoplasma infection 09/04/2015    HPV (human papilloma virus) anogenital infection     Abdominal pain 03/27/2015    Nasal congestion 03/27/2015    Post partum depression 03/27/2015    Anxiety        PLAN:  Discussed results consistent with pap smear and no further management at this time.   RTO one year for annual exam with pap smear

## 2020-11-12 RX ORDER — TOPIRAMATE 25 MG/1
TABLET ORAL
Qty: 270 TABLET | Refills: 1 | Status: SHIPPED | OUTPATIENT
Start: 2020-11-12 | End: 2021-08-18 | Stop reason: SDUPTHER

## 2020-11-12 NOTE — TELEPHONE ENCOUNTER
Patient calling for refill of Topiramate 25 mg. The prescription written 4/21/2020 failed to go to the pharmacy. Medication active on med list yes      Date last ordered: 4/21/2020, but failed to go to the pharmacy  verified on 11/12/2020  verified by REGINALD Miller LPN      Date of last appointment 10/2/2020    Next Visit Date:  Visit date not found

## 2021-05-06 ENCOUNTER — OFFICE VISIT (OUTPATIENT)
Dept: GASTROENTEROLOGY | Age: 36
End: 2021-05-06
Payer: COMMERCIAL

## 2021-05-06 VITALS
SYSTOLIC BLOOD PRESSURE: 127 MMHG | HEART RATE: 77 BPM | BODY MASS INDEX: 21.47 KG/M2 | DIASTOLIC BLOOD PRESSURE: 75 MMHG | WEIGHT: 133 LBS

## 2021-05-06 DIAGNOSIS — R19.7 DIARRHEA, UNSPECIFIED TYPE: Primary | ICD-10-CM

## 2021-05-06 PROCEDURE — 4004F PT TOBACCO SCREEN RCVD TLK: CPT | Performed by: INTERNAL MEDICINE

## 2021-05-06 PROCEDURE — G8427 DOCREV CUR MEDS BY ELIG CLIN: HCPCS | Performed by: INTERNAL MEDICINE

## 2021-05-06 PROCEDURE — G8420 CALC BMI NORM PARAMETERS: HCPCS | Performed by: INTERNAL MEDICINE

## 2021-05-06 PROCEDURE — 99213 OFFICE O/P EST LOW 20 MIN: CPT | Performed by: INTERNAL MEDICINE

## 2021-05-06 ASSESSMENT — ENCOUNTER SYMPTOMS
EYES NEGATIVE: 1
GASTROINTESTINAL NEGATIVE: 1
RESPIRATORY NEGATIVE: 1
BACK PAIN: 1

## 2021-05-06 NOTE — PROGRESS NOTES
GI FOLLOW UP    INTERVAL HISTORY:     No further episode of diarrhea  Clinically doing much better after avoiding her allergens  Currently seeing an allergist with significant improvement in her GI symptoms    Chief Complaint   Patient presents with    Follow-up     lab follow up    Diarrhea     Patient denies having any diarrhea. States her bowel movements are normal unless she eats something she is allergic to. Patient states adhering to strict diet to avoid this.  Gastroesophageal Reflux     Patient only notices symptoms when consuming trigger foods. She states she would rather avoid the foods than use medication. 1. Diarrhea, unspecified type          HISTORY OF PRESENT ILLNESS: Ms.Christine Manny Khalil is a 38 y.o. female with a past history remarkable for sciatica, chronic lower back pain and cervical neck pain, referred for evaluation of chronic diarrhea that was severe with multiple bowel movements onset in March 2020. Patient is identified to have a significant gastrocolic reflex that was associated with 9-10 bowel movements per day.     Diarrhea-loose watery in nature, work-up included:  Fecal elastase-negative  Celiac testing negative  IBD panel negative  TSH negative  Infectious work-up negative however incomplete  Prior cholecystectomy approximately 6 years ago appears to be unrelated  Food comprehensive panel revealed:  Sensitivities to pepper, carrot, grape, lettuce, navy bean, peanut, rye, tomato, wheat, oat, potato, rice, barley, corn, cabbage. With nice clinical sensitivity to peanut        Smoke- 1 ppd day. 15 yrs ago. Social drinking  Marijuana daily  CCY removed 6 yrs ago. Hepatic hemangiomas.      Pancreatic divisim. Tonsillectomy in the past.   Rheumatic fever.   History of migraines  2 children     Past Medical,Family, and Social History reviewed and does contribute to the patient presenting condition. Patient's PMH/PSH,SH,PSYCH Hx, MEDs, ALLERGIES, and ROS were all reviewed and updated in the appropriate sections.     PAST MEDICAL HISTORY:  Past Medical History:   Diagnosis Date    Acid reflux     Allergic reaction to allergy skin test 09/2020    legumes, wheat, peanuts almonds    Anxiety     Fibromyalgia     Headache     migraines    HPV (human papilloma virus) anogenital infection     Insomnia     Movement disorder     spasms, B&B    Neuropathy     left hands, feet    Rheumatic fever     Spinal stenosis     Wears glasses        Past Surgical History:   Procedure Laterality Date    CHOLECYSTECTOMY      DENTAL SURGERY      removed some teeth at age 1   [facility] INTRAUTERINE DEVICE INSERTION  2017    TONSILLECTOMY AND ADENOIDECTOMY Bilateral 1/7/2016       CURRENT MEDICATIONS:    Current Outpatient Medications:     QUEtiapine (SEROQUEL) 25 MG tablet, 12.5mg to 25mg QHS PRN, Disp: 30 tablet, Rfl: 3    topiramate (TOPAMAX) 25 MG tablet, 50mg AM, 25mg PM, Disp: 270 tablet, Rfl: 1    hyoscyamine (ANASPAZ;LEVSIN) 125 MCG tablet, Take 1 tablet by mouth every 4 hours as needed for Cramping, Disp: 180 tablet, Rfl: 3    ondansetron (ZOFRAN) 4 MG tablet, Take 1 tablet by mouth daily as needed for Nausea or Vomiting (Patient not taking: Reported on 10/2/2020), Disp: 30 tablet, Rfl: 0    Wheat Dextrin (BENEFIBER) POWD, Take 4 g by mouth 3 times daily (with meals) (Patient not taking: Reported on 10/2/2020), Disp: 30 Can, Rfl: 0    famotidine (PEPCID) 40 MG tablet, Take 1 tablet by mouth every evening (Patient not taking: Reported on 10/2/2020), Disp: 30 tablet, Rfl: 3    Cholecalciferol (VITAMIN D3 PO), Take 2,000 Units by mouth daily, Disp: , Rfl:     vitamin B-2 (RIBOFLAVIN) 100 MG TABS tablet, Take 1 tablet by mouth daily (Patient not taking: Reported on 10/2/2020), Disp: 30 tablet, Rfl: 3    ALLERGIES:   Allergies   Allergen Reactions    Latex Other (See Comments)     Redness and swelling    Banana Anaphylaxis     Lips swell    Banana Extract Allergy Skin Test Anaphylaxis    [place] (Diagnostic)     Avocado     Buckwheat     Coconut Fatty Acids     Coconut Oil     Kiwi Extract     Garret Flavor [Flavoring Agent]     Other      Green peas, lima beans     Papaya Derivatives     Peanut (Diagnostic)     Tape Arvie Fatoumata Tape] Other (See Comments)     Plastic tape causes skin irritation    Wheat Bran     Maxalt [Rizatriptan Benzoate] Nausea And Vomiting       FAMILY HISTORY:       Problem Relation Age of Onset    Depression Mother     Diabetes Father     High Blood Pressure Father     High Cholesterol Father     Obesity Father     Other Father     High Blood Pressure Paternal Grandmother     Breast Cancer Paternal Grandmother     High Cholesterol Paternal Grandmother     Asthma Brother     Other Brother         Peanut allergies    Depression Brother     No Known Problems Maternal Grandmother     No Known Problems Maternal Grandfather     No Known Problems Paternal Grandfather          SOCIAL HISTORY:   Social History     Socioeconomic History    Marital status: Single     Spouse name: Not on file    Number of children: Not on file    Years of education: Not on file    Highest education level: Not on file   Occupational History    Occupation: student     Employer: NONE   Social Needs    Financial resource strain: Not on file    Food insecurity     Worry: Not on file     Inability: Not on file    Transportation needs     Medical: Not on file     Non-medical: Not on file   Tobacco Use    Smoking status: Current Every Day Smoker     Packs/day: 0.50     Years: 17.00     Pack years: 8.50     Types: Cigarettes     Start date: 6/17/2004    Smokeless tobacco: Never Used   Substance and Sexual Activity    Alcohol use:  Yes     Alcohol/week: 0.0 standard drinks     Frequency: Monthly or less     Comment: socially    Drug use: Yes     Types: Marijuana     Comment: Patient states smoking to help her eat    Sexual activity: Not Currently     Birth control/protection: I.U.D. Lifestyle    Physical activity     Days per week: Not on file     Minutes per session: Not on file    Stress: Not on file   Relationships    Social connections     Talks on phone: Not on file     Gets together: Not on file     Attends Voodoo service: Not on file     Active member of club or organization: Not on file     Attends meetings of clubs or organizations: Not on file     Relationship status: Not on file    Intimate partner violence     Fear of current or ex partner: Not on file     Emotionally abused: Not on file     Physically abused: Not on file     Forced sexual activity: Not on file   Other Topics Concern    Not on file   Social History Narrative    Not on file       REVIEW OF SYSTEMS: A 12-point review of systems was obtained and pertinent positives and negatives were listed below. REVIEW OF SYSTEMS:     Constitutional: No fever, no chills, no lethargy, no weakness. HEENT:  No headache, otalgia, itchy eyes, nasal discharge or sore throat. Cardiac:  No chest pain, dyspnea, orthopnea or PND. Chest:   No cough, phlegm or wheezing. Abdomen:      Detailed by MA   Neuro:  No focal weakness, abnormal movements or seizure like activity. Skin:   No rashes, no itching. :   No hematuria, no pyuria, no dysuria, no flank pain. Extremities:  No swelling or joint pains. ROS was otherwise negative    Review of Systems   Constitutional: Negative. HENT: Negative. Eyes: Negative. Respiratory: Negative. Cardiovascular: Negative. Gastrointestinal: Negative. Endocrine: Negative. Genitourinary: Negative. Musculoskeletal: Positive for arthralgias and back pain. Skin: Negative. Allergic/Immunologic: Positive for environmental allergies and food allergies. Neurological: Positive for light-headedness and headaches.    Hematological: Bruises/bleeds easily. Psychiatric/Behavioral: Negative. All other systems reviewed and are negative. PHYSICAL EXAMINATION: Vital signs reviewed per the nursing documentation. /75   Pulse 77   Wt 133 lb (60.3 kg)   BMI 21.47 kg/m²   Body mass index is 21.47 kg/m². Physical Exam    Physical Exam   Constitutional: Patient is oriented to person, place, and time. Patient appears well-developed and well-nourished. HENT:   Head: Normocephalic and atraumatic. Eyes: Pupils are equal, round, and reactive to light. EOM are normal.   Neck: Normal range of motion. Neck supple. No JVD present. No tracheal deviation present. No thyromegaly present. Cardiovascular: Normal rate, regular rhythm, normal heart sounds and intact distal pulses. Pulmonary/Chest: Effort normal and breath sounds normal. No stridor. No respiratory distress. He has no wheezes. He has no rales. He exhibits no tenderness. Abdominal: Soft. Bowel sounds are normal. He exhibits no distension and no mass. There is no tenderness. There is no rebound and no guarding. No hernia. Musculoskeletal: Normal range of motion. Lymphadenopathy:    Patient has no cervical adenopathy. Neurological: Patient is alert and oriented to person, place, and time. Psychiatric: Patient has a normal mood and affect.  Patient behavior is normal.       LABORATORY DATA: Reviewed  Lab Results   Component Value Date    WBC 10.8 06/01/2020    HGB 15.7 (H) 06/01/2020    HCT 49.6 (H) 06/01/2020    MCV 89.4 06/01/2020     06/01/2020     06/01/2020    K 4.5 06/01/2020     06/01/2020    CO2 22 06/01/2020    BUN 11 06/01/2020    CREATININE 0.78 06/01/2020    LABALBU 4.5 06/01/2020    BILITOT 0.45 06/01/2020    ALKPHOS 51 06/01/2020    AST 14 06/01/2020    ALT 10 06/01/2020         Lab Results   Component Value Date    RBC 5.55 (H) 06/01/2020    HGB 15.7 (H) 06/01/2020    MCV 89.4 06/01/2020    MCH 28.3 06/01/2020    MCHC 31.7 06/01/2020 RDW 13.2 06/01/2020    MPV 10.3 06/01/2020    BASOPCT 1 06/01/2020    LYMPHSABS 2.90 06/01/2020    MONOSABS 0.72 06/01/2020    NEUTROABS 6.95 06/01/2020    EOSABS 0.11 06/01/2020    BASOSABS 0.05 06/01/2020         DIAGNOSTIC TESTING:     No results found. IMPRESSION: Ana Sheldon a 39 y. o. female with a past history remarkable for diarrhea, referred for evaluation of of the symptoms.  Patient was identified to have multiple food allergens (potential) which include pepper, carrots, grapes, lettuce, navy bean, rye, soybean, tomato, wheat, potato, oat, rice, corn, and cabbage.  After avoiding all of these potential food allergens patient has reported significant GI improvement with no further episodes of diarrhea or GI discomfort.  Patient was referred to an allergist to identify true allergens and to potentially reintroduce sesame these potential allergens into her normal dietary pattern. Assessment  1. Diarrhea, unspecified type        PLAN:    1) chronic diarrhea-resolved completely after the patient was identified to have significant amount of food sensitivities. Currently with 1-2 bowel movements per day which appear to be normal in consistency and color. Patient is seeing an allergist and has been avoiding her dietary and environmental allergens to reduce her symptoms. Currently asymptomatic from GI standpoint    2) Clinically doing well,     3) follow-up in 6 months    Thank you for allowing me to participate in the care of Ms. Khalil. For any further questions please do not hesitate to contact me. I have reviewed and agree with the ROS entered by the MA/LPN from today's encounter documented in a separate note.         Carlos William MD, MPH   [facility]  Office #: [phone]    this note is created with the assistance of a speech recognition program.  While intending to generate a document that actually reflects the content of the visit, the document can still have some errors including those of syntax and sound a like substitutions which may escape proof reading. It such instances, actual meaning can be extrapolated by contextual diversion.

## 2021-07-26 ENCOUNTER — OFFICE VISIT (OUTPATIENT)
Dept: FAMILY MEDICINE CLINIC | Age: 36
End: 2021-07-26
Payer: COMMERCIAL

## 2021-07-26 VITALS
SYSTOLIC BLOOD PRESSURE: 105 MMHG | WEIGHT: 135 LBS | DIASTOLIC BLOOD PRESSURE: 69 MMHG | TEMPERATURE: 97.3 F | HEIGHT: 66 IN | BODY MASS INDEX: 21.69 KG/M2 | OXYGEN SATURATION: 98 % | HEART RATE: 69 BPM

## 2021-07-26 DIAGNOSIS — F33.1 MAJOR DEPRESSIVE DISORDER, RECURRENT, MODERATE (HCC): Primary | ICD-10-CM

## 2021-07-26 DIAGNOSIS — A63.0 HPV (HUMAN PAPILLOMA VIRUS) ANOGENITAL INFECTION: ICD-10-CM

## 2021-07-26 DIAGNOSIS — Z11.59 NEED FOR HEPATITIS C SCREENING TEST: ICD-10-CM

## 2021-07-26 DIAGNOSIS — Z72.0 TOBACCO ABUSE: ICD-10-CM

## 2021-07-26 PROCEDURE — G8420 CALC BMI NORM PARAMETERS: HCPCS | Performed by: FAMILY MEDICINE

## 2021-07-26 PROCEDURE — 99214 OFFICE O/P EST MOD 30 MIN: CPT | Performed by: FAMILY MEDICINE

## 2021-07-26 PROCEDURE — G8427 DOCREV CUR MEDS BY ELIG CLIN: HCPCS | Performed by: FAMILY MEDICINE

## 2021-07-26 PROCEDURE — 4004F PT TOBACCO SCREEN RCVD TLK: CPT | Performed by: FAMILY MEDICINE

## 2021-07-26 RX ORDER — CETIRIZINE HYDROCHLORIDE 10 MG/1
TABLET ORAL
COMMUNITY
Start: 2021-07-21

## 2021-07-26 RX ORDER — OMEPRAZOLE 20 MG/1
CAPSULE, DELAYED RELEASE ORAL
COMMUNITY
End: 2021-07-26

## 2021-07-26 RX ORDER — PSEUDOEPHEDRINE HCL 30 MG
TABLET ORAL
COMMUNITY
End: 2021-07-26

## 2021-07-26 RX ORDER — EPINEPHRINE 0.3 MG/.3ML
0.3 INJECTION SUBCUTANEOUS PRN
COMMUNITY

## 2021-07-26 SDOH — ECONOMIC STABILITY: FOOD INSECURITY: WITHIN THE PAST 12 MONTHS, THE FOOD YOU BOUGHT JUST DIDN'T LAST AND YOU DIDN'T HAVE MONEY TO GET MORE.: NEVER TRUE

## 2021-07-26 SDOH — ECONOMIC STABILITY: FOOD INSECURITY: WITHIN THE PAST 12 MONTHS, YOU WORRIED THAT YOUR FOOD WOULD RUN OUT BEFORE YOU GOT MONEY TO BUY MORE.: NEVER TRUE

## 2021-07-26 ASSESSMENT — SOCIAL DETERMINANTS OF HEALTH (SDOH): HOW HARD IS IT FOR YOU TO PAY FOR THE VERY BASICS LIKE FOOD, HOUSING, MEDICAL CARE, AND HEATING?: NOT HARD AT ALL

## 2021-07-26 NOTE — PROGRESS NOTES
[address] PRIMARY CARE  [address] [place] [address] [address] [place] [address]  Dept: [phone]  Dept Fax: [phone]    Nga Haque is a 39 y.o. female who is a Established patient, who presents today for her medical conditions/complaints as noted below:  Chief Complaint   Patient presents with    Establish Care         HPI:     The patient is a 39year old female with a past medical history significant for severe allergies to multiple foods as well as environmental things including peanuts/almonds/buckwheat/legumes/wheat, and HPV positive following with OB/GYN who presents today to [facility] and for checkup. She is getting injections through the allergy/immunology office. She gets the injections twice a week. She does have zyrtec to take if she has any reaction to the injection and it helps. Since getting on the allergy injections her abdominal concerns have almost resolved and she feels like she has more energy. She has lost weight.       No results found for: LABA1C          ( goal A1Cis < 7)   No results found for: LABMICR  LDL Cholesterol (mg/dL)   Date Value   09/27/2019 116       (goal LDL is <100)   AST (U/L)   Date Value   06/01/2020 14     ALT (U/L)   Date Value   06/01/2020 10     BUN (mg/dL)   Date Value   06/01/2020 11     BP Readings from Last 3 Encounters:   07/26/21 105/69   05/06/21 127/75   10/20/20 120/60          (goal 120/80)    Past Medical History:   Diagnosis Date    Abdominal pain 3/27/2015    Acid reflux     Allergic reaction to allergy skin test 09/2020    legumes, wheat, peanuts almonds    Anxiety     Fibromyalgia     Headache     migraines    HPV (human papilloma virus) anogenital infection     Insomnia     Lumbar radicular pain 9/7/2018    Movement disorder     spasms, B&B    Mycoplasma infection 9/4/2015    Neuropathy     left hands, feet    Numbness and tingling 12/19/2017    Post partum depression 3/27/2015    Rheumatic fever     Spinal stenosis     Wears glasses       Past Surgical History:   Procedure Laterality Date   Lavina Halsted Dr. Hobert Peed in [address]      removed some teeth at age 1   Seb Wright INTRAUTERINE DEVICE INSERTION  2017    TONSILLECTOMY AND ADENOIDECTOMY Bilateral 01/07/2016       Family History   Problem Relation Age of Onset    Depression Mother     Diabetes Father     High Blood Pressure Father     High Cholesterol Father     Obesity Father     Other Father     Asthma Brother     Other Brother         Peanut allergies and multiple other allergies    Depression Brother     No Known Problems Maternal Grandmother     No Known Problems Maternal Grandfather     High Blood Pressure Paternal Grandmother     Breast Cancer Paternal Grandmother     High Cholesterol Paternal Grandmother     No Known Problems Paternal Grandfather     Colon Cancer Paternal Uncle     Uterine Cancer Neg Hx     Ovarian Cancer Neg Hx     Heart Disease Neg Hx     Stroke Neg Hx        Social History     Tobacco Use    Smoking status: Current Every Day Smoker     Packs/day: 0.50     Years: 17.00     Pack years: 8.50     Types: Cigarettes     Start date: 6/17/2004    Smokeless tobacco: Never Used    Tobacco comment: is down to a half pack per day   Substance Use Topics    Alcohol use: Yes     Alcohol/week: 4.0 standard drinks     Types: 4 Cans of beer per week      Current Outpatient Medications   Medication Sig Dispense Refill    cetirizine (ZYRTEC) 10 MG tablet       EPINEPHrine (EPIPEN 2-KALI) 0.3 MG/0.3ML SOAJ injection Inject 0.3 mg into the muscle as needed Use as directed for allergic reaction      QUEtiapine (SEROQUEL) 25 MG tablet 12.5mg to 25mg QHS PRN 30 tablet 3    topiramate (TOPAMAX) 25 MG tablet 50mg AM, 25mg  tablet 1     No current facility-administered medications for this visit.      Allergies   Allergen Reactions    Latex Other (See Comments) Redness and swelling    Banana Anaphylaxis     Lips swell    Banana Extract Allergy Skin Test Anaphylaxis    [place] (Diagnostic)     Avocado     Buckwheat     Coconut Fatty Acids     Coconut Oil     Kiwi Extract     [place] Flavor [Flavoring Agent]     Other      Green peas, lima beans     Papaya Derivatives     Peanut (Diagnostic)     Tape Jerona Tito Tape] Other (See Comments)     Plastic tape causes skin irritation    Wheat Bran     Maxalt [Rizatriptan Benzoate] Nausea And Vomiting       Health Maintenance   Topic Date Due    Hepatitis C screen  Never done    COVID-19 Vaccine (1) 01/26/2022 (Originally 5/6/1996)    Flu vaccine (1) 09/01/2021    Cervical cancer screen  09/01/2025    DTaP/Tdap/Td vaccine (3 - Td or Tdap) 11/29/2026    Pneumococcal 0-64 years Vaccine (2 of 2 - PPSV23) 05/06/2049    HIV screen  Completed    Hepatitis A vaccine  Aged Out    Hepatitis B vaccine  Aged Out    Hib vaccine  Aged Out    Meningococcal (ACWY) vaccine  Aged Out    Varicella vaccine  Discontinued       Subjective:     Review of Systems   Constitutional: Negative. HENT: Negative. Eyes: Negative. Respiratory: Negative. Cardiovascular: Negative. Gastrointestinal: Negative. Genitourinary: Negative. Musculoskeletal: Negative. Skin: Negative. Allergic/Immunologic: Positive for environmental allergies and food allergies. Negative for immunocompromised state. Neurological: Negative. Psychiatric/Behavioral: Negative. Objective:     Physical Exam  Vitals and nursing note reviewed. Constitutional:       General: She is awake. She is not in acute distress. Appearance: Normal appearance. She is normal weight. She is not ill-appearing, toxic-appearing or diaphoretic. HENT:      Head: Normocephalic and atraumatic.       Right Ear: Tympanic membrane, ear canal and external ear normal.      Left Ear: Tympanic membrane, ear canal and external ear normal.      Nose: Nose normal. Mouth/Throat:      Mouth: Mucous membranes are moist.   Eyes:      Extraocular Movements: Extraocular movements intact. Conjunctiva/sclera: Conjunctivae normal.      Pupils: Pupils are equal, round, and reactive to light. Cardiovascular:      Rate and Rhythm: Normal rate and regular rhythm. Pulses: Normal pulses. Heart sounds: Normal heart sounds. No murmur heard. No friction rub. No gallop. Pulmonary:      Effort: Pulmonary effort is normal. No respiratory distress. Breath sounds: Normal breath sounds. No stridor. No wheezing, rhonchi or rales. Chest:      Chest wall: No tenderness. Abdominal:      General: Abdomen is flat. Bowel sounds are normal. There is no distension. Palpations: Abdomen is soft. There is no mass. Tenderness: There is no abdominal tenderness. There is no guarding or rebound. Hernia: No hernia is present. Musculoskeletal:         General: Normal range of motion. Cervical back: Normal range of motion and neck supple. Skin:     General: Skin is warm and dry. Neurological:      General: No focal deficit present. Mental Status: She is alert and oriented to person, place, and time. Mental status is at baseline. Cranial Nerves: No cranial nerve deficit. Sensory: No sensory deficit. Motor: No weakness. Coordination: Coordination normal.      Gait: Gait normal.   Psychiatric:         Attention and Perception: Attention normal.         Mood and Affect: Mood and affect normal.         Speech: Speech normal.         Behavior: Behavior normal.         Thought Content: Thought content normal.         Judgment: Judgment normal.       /69   Pulse 69   Temp 97.3 °F (36.3 °C)   Ht 5' 6\" (1.676 m)   Wt 135 lb (61.2 kg)   SpO2 98%   BMI 21.79 kg/m²     Assessment:       Diagnosis Orders   1. Major depressive disorder, recurrent, moderate (HCC)     2. HPV (human papilloma virus) anogenital infection     3.  Tobacco abuse     4. Need for hepatitis C screening test  Hepatitis C Antibody             Plan:    Major depression-patient is to continue on current medications. She states she is doing well on the Topamax and the Seroquel. The Topamax does help with other symptoms such as headaches. We will continue to monitor closely. Patient denies any suicidal or homicidal ideation. HPV infection with LSIL-patient is following with Dr. Glenny Belle. She has an appointment September 7, 2020 to repeat Pap. Tobacco abuse-discussed with patient the importance of quitting smoking. The patient is actively working towards this and is down to half pack a day. She declines any need for other interventions. Return in about 1 year (around 7/26/2022) for physical.    Orders Placed This Encounter   Procedures    Hepatitis C Antibody     Standing Status:   Future     Standing Expiration Date:   7/26/2022     No orders of the defined types were placed in this encounter. Patient given educational materials - see patient instructions. Discussed use, benefit, and side effects of prescribed medications. All patientquestions answered. Pt voiced understanding. Reviewed health maintenance. Instructedto continue current medications, diet and exercise. Patient agreed with treatmentplan. Follow up as directed.      Electronically signed by Jermaine Christine MD on 7/31/2021 at 3:52 PM

## 2021-07-31 PROBLEM — R20.0 NUMBNESS AND TINGLING: Status: RESOLVED | Noted: 2017-12-19 | Resolved: 2021-07-31

## 2021-07-31 PROBLEM — R20.2 NUMBNESS AND TINGLING: Status: RESOLVED | Noted: 2017-12-19 | Resolved: 2021-07-31

## 2021-07-31 PROBLEM — M54.16 LUMBAR RADICULAR PAIN: Status: RESOLVED | Noted: 2018-09-07 | Resolved: 2021-07-31

## 2021-07-31 ASSESSMENT — ENCOUNTER SYMPTOMS
EYES NEGATIVE: 1
GASTROINTESTINAL NEGATIVE: 1
RESPIRATORY NEGATIVE: 1

## 2021-08-18 DIAGNOSIS — G47.00 INSOMNIA, UNSPECIFIED TYPE: ICD-10-CM

## 2021-08-18 DIAGNOSIS — R51.9 HEADACHE DISORDER: ICD-10-CM

## 2021-08-18 RX ORDER — QUETIAPINE FUMARATE 25 MG/1
TABLET, FILM COATED ORAL
Qty: 30 TABLET | Refills: 3 | Status: SHIPPED | OUTPATIENT
Start: 2021-08-18

## 2021-08-18 RX ORDER — TOPIRAMATE 25 MG/1
TABLET ORAL
Qty: 270 TABLET | Refills: 1 | Status: SHIPPED | OUTPATIENT
Start: 2021-08-18

## 2021-08-18 NOTE — TELEPHONE ENCOUNTER
Dr. Hari Blakely patient is requesting:    Pharmacy requesting a  refill of: Topamax 25mg        Medication active on med list yes        Date of last prescription: 11/12/2020  with 1  refills verified on 08/18/2021    verified by MANUEL JONES        Date of last appointment 10/02/2020     Next Visit Date: none, message sent to schedule with new physician.

## 2021-08-18 NOTE — TELEPHONE ENCOUNTER
Pharmacy requesting a  refill of: Seroquel 25mg        Medication active on med list yes        Date of last prescription: 03/24/2021  with 3  refills verified on 08/18/2021    verified by MANUEL JONES        Date of last appointment 10/02/2020     Next Visit Date: none, message sent to schedule with new physician.

## 2021-08-23 ENCOUNTER — OFFICE VISIT (OUTPATIENT)
Dept: GASTROENTEROLOGY | Age: 36
End: 2021-08-23
Payer: COMMERCIAL

## 2021-08-23 VITALS — DIASTOLIC BLOOD PRESSURE: 62 MMHG | SYSTOLIC BLOOD PRESSURE: 111 MMHG | BODY MASS INDEX: 22.27 KG/M2 | WEIGHT: 138 LBS

## 2021-08-23 DIAGNOSIS — K21.9 GASTROESOPHAGEAL REFLUX DISEASE, UNSPECIFIED WHETHER ESOPHAGITIS PRESENT: ICD-10-CM

## 2021-08-23 DIAGNOSIS — R19.7 DIARRHEA, UNSPECIFIED TYPE: Primary | ICD-10-CM

## 2021-08-23 PROCEDURE — G8427 DOCREV CUR MEDS BY ELIG CLIN: HCPCS | Performed by: INTERNAL MEDICINE

## 2021-08-23 PROCEDURE — G8420 CALC BMI NORM PARAMETERS: HCPCS | Performed by: INTERNAL MEDICINE

## 2021-08-23 PROCEDURE — 4004F PT TOBACCO SCREEN RCVD TLK: CPT | Performed by: INTERNAL MEDICINE

## 2021-08-23 PROCEDURE — 99213 OFFICE O/P EST LOW 20 MIN: CPT | Performed by: INTERNAL MEDICINE

## 2021-08-23 RX ORDER — PSYLLIUM SEED (WITH DEXTROSE)
1 POWDER (GRAM) ORAL DAILY
Qty: 30 PACKET | Refills: 1 | Status: SHIPPED | OUTPATIENT
Start: 2021-08-23 | End: 2021-09-15

## 2021-08-23 RX ORDER — BISACODYL 5 MG
TABLET, DELAYED RELEASE (ENTERIC COATED) ORAL
Qty: 4 TABLET | Refills: 0 | Status: SHIPPED | OUTPATIENT
Start: 2021-08-23 | End: 2021-09-14 | Stop reason: ALTCHOICE

## 2021-08-23 RX ORDER — OMEPRAZOLE 20 MG/1
20 CAPSULE, DELAYED RELEASE ORAL DAILY
Qty: 30 CAPSULE | Refills: 3 | Status: SHIPPED | OUTPATIENT
Start: 2021-08-23 | End: 2022-02-22

## 2021-08-23 RX ORDER — POLYETHYLENE GLYCOL 3350 17 G/17G
POWDER, FOR SOLUTION ORAL
Qty: 238 G | Refills: 0 | Status: SHIPPED | OUTPATIENT
Start: 2021-08-23 | End: 2021-09-14 | Stop reason: ALTCHOICE

## 2021-08-23 ASSESSMENT — ENCOUNTER SYMPTOMS
GASTROINTESTINAL NEGATIVE: 1
BACK PAIN: 1
RESPIRATORY NEGATIVE: 1
EYES NEGATIVE: 1

## 2021-08-23 NOTE — PROGRESS NOTES
unrelated  Food comprehensive panel revealed:  Sensitivities to pepper, carrot, grape, lettuce, navy bean, peanut, rye, tomato, wheat, oat, potato, rice, barley, corn, cabbage.  With nice clinical sensitivity to peanut        Smoke- 1 ppd day. 15 yrs ago. Social drinking  Marijuana daily  CCY removed 6 yrs ago. Hepatic hemangiomas.      Pancreatic divisim. Tonsillectomy in the past.   Rheumatic fever. History of migraines  2 children     Past Medical,Family, and Social History reviewed and does contribute to the patient presenting condition. Patient's PMH/PSH,SH,PSYCH Hx, MEDs, ALLERGIES, and ROS were all reviewed and updated in the appropriate sections.     PAST MEDICAL HISTORY:  Past Medical History:   Diagnosis Date    Abdominal pain 3/27/2015    Acid reflux     Allergic reaction to allergy skin test 09/2020    legumes, wheat, peanuts almonds    Anxiety     Fibromyalgia     Headache     migraines    HPV (human papilloma virus) anogenital infection     Insomnia     Lumbar radicular pain 9/7/2018    Movement disorder     spasms, B&B    Mycoplasma infection 9/4/2015    Neuropathy     left hands, feet    Numbness and tingling 12/19/2017    Post partum depression 3/27/2015    Rheumatic fever     Spinal stenosis     Wears glasses        Past Surgical History:   Procedure Laterality Date   Rashel Hernandez in [address]      removed some teeth at age 1   Donnajenniffer Chow INTRAUTERINE DEVICE INSERTION  2017    TONSILLECTOMY AND ADENOIDECTOMY Bilateral 01/07/2016       CURRENT MEDICATIONS:    Current Outpatient Medications:     topiramate (TOPAMAX) 25 MG tablet, 50mg AM, 25mg PM, Disp: 270 tablet, Rfl: 1    QUEtiapine (SEROQUEL) 25 MG tablet, 12.5mg to 25mg QHS PRN, Disp: 30 tablet, Rfl: 3    cetirizine (ZYRTEC) 10 MG tablet, , Disp: , Rfl:     EPINEPHrine (EPIPEN 2-KALI) 0.3 MG/0.3ML SOAJ injection, Inject 0.3 mg into the muscle as needed Use as directed for allergic reaction, Disp: , Rfl:     ALLERGIES:   Allergies   Allergen Reactions    Latex Other (See Comments)     Redness and swelling    Banana Anaphylaxis     Lips swell    Banana Extract Allergy Skin Test Anaphylaxis    [place] (Diagnostic)     Avocado     Buckwheat     Coconut Fatty Acids     Coconut Oil     Kiwi Extract     [place] Flavor [Flavoring Agent]     Other      Green peas, lima beans     Papaya Derivatives     Peanut (Diagnostic)     Tape [Adhesive Tape] Other (See Comments)     Plastic tape causes skin irritation    Wheat Bran     Maxalt [Rizatriptan Benzoate] Nausea And Vomiting       FAMILY HISTORY:       Problem Relation Age of Onset    Depression Mother     Diabetes Father     High Blood Pressure Father     High Cholesterol Father     Obesity Father     Other Father     Asthma Brother     Other Brother         Peanut allergies and multiple other allergies    Depression Brother     No Known Problems Maternal Grandmother     No Known Problems Maternal Grandfather     High Blood Pressure Paternal Grandmother     Breast Cancer Paternal Grandmother     High Cholesterol Paternal Grandmother     No Known Problems Paternal Grandfather     Colon Cancer Paternal Uncle     Uterine Cancer Neg Hx     Ovarian Cancer Neg Hx     Heart Disease Neg Hx     Stroke Neg Hx          SOCIAL HISTORY:   Social History     Socioeconomic History    Marital status: Single     Spouse name: Not on file    Number of children: 2    Years of education: Not on file    Highest education level: Not on file   Occupational History    Occupation: student     Employer: NONE   Tobacco Use    Smoking status: Current Every Day Smoker     Packs/day: 0.50     Years: 17.00     Pack years: 8.50     Types: Cigarettes     Start date: 6/17/2004    Smokeless tobacco: Never Used    Tobacco comment: is down to a half pack per day   Vaping Use    Vaping Use: Never used   Substance and Sexual Activity    Alcohol use: Yes     Alcohol/week: 4.0 standard drinks     Types: 4 Cans of beer per week    Drug use: Yes     Frequency: 7.0 times per week     Types: Marijuana     Comment: Patient states smoking to help her eat    Sexual activity: Yes     Partners: Male     Birth control/protection: I.U.D. Comment: has been with current partner since 2018   Other Topics Concern    Not on file   Social History Narrative    Not on file     Social Determinants of Health     Financial Resource Strain: Low Risk     Difficulty of Paying Living Expenses: Not hard at all   Food Insecurity: No Food Insecurity    Worried About Running Out of Food in the Last Year: Never true    [address] in the Last Year: Never true   Transportation Needs:     Lack of Transportation (Medical):  Lack of Transportation (Non-Medical):    Physical Activity:     Days of Exercise per Week:     Minutes of Exercise per Session:    Stress:     Feeling of Stress :    Social Connections:     Frequency of Communication with Friends and Family:     Frequency of Social Gatherings with Friends and Family:     Attends [employer] Services:     Active Member of Clubs or Organizations:     Attends Club or Organization Meetings:     Marital Status:    Intimate Partner Violence:     Fear of Current or Ex-Partner:     Emotionally Abused:     Physically Abused:     Sexually Abused:        REVIEW OF SYSTEMS: A 12-point review of systems was obtained and pertinent positives and negatives were listed below. REVIEW OF SYSTEMS:     Constitutional: No fever, no chills, no lethargy, no weakness. HEENT:  No headache, otalgia, itchy eyes, nasal discharge or sore throat. Cardiac:  No chest pain, dyspnea, orthopnea or PND. Chest:   No cough, phlegm or wheezing. Abdomen:      Detailed by MA   Neuro:  No focal weakness, abnormal movements or seizure like activity. Skin:   No rashes, no itching.   :   No hematuria, no pyuria, no dysuria, no flank pain. Extremities:  No swelling or joint pains. ROS was otherwise negative    Review of Systems   Constitutional: Negative. HENT: Negative. Eyes: Negative. Respiratory: Negative. Cardiovascular: Negative. Gastrointestinal: Negative. Endocrine: Negative. Genitourinary: Negative. Musculoskeletal: Positive for arthralgias and back pain. Skin: Negative. Allergic/Immunologic: Positive for environmental allergies and food allergies. Neurological: Positive for light-headedness and headaches. Hematological: Bruises/bleeds easily. Psychiatric/Behavioral: Negative. All other systems reviewed and are negative. PHYSICAL EXAMINATION: Vital signs reviewed per the nursing documentation. /62   Wt 138 lb (62.6 kg)   BMI 22.27 kg/m²   Body mass index is 22.27 kg/m². Physical Exam    Physical Exam   Constitutional: Patient is oriented to person, place, and time. Patient appears well-developed and well-nourished. HENT:   Head: Normocephalic and atraumatic. Eyes: Pupils are equal, round, and reactive to light. EOM are normal.   Neck: Normal range of motion. Neck supple. No JVD present. No tracheal deviation present. No thyromegaly present. Cardiovascular: Normal rate, regular rhythm, normal heart sounds and intact distal pulses. Pulmonary/Chest: Effort normal and breath sounds normal. No stridor. No respiratory distress. He has no wheezes. He has no rales. He exhibits no tenderness. Abdominal: Soft. Bowel sounds are normal. He exhibits no distension and no mass. There is no tenderness. There is no rebound and no guarding. No hernia. Musculoskeletal: Normal range of motion. Lymphadenopathy:    Patient has no cervical adenopathy. Neurological: Patient is alert and oriented to person, place, and time. Psychiatric: Patient has a normal mood and affect.  Patient behavior is normal.       LABORATORY DATA: Reviewed  Lab Results   Component Value Date WBC 10.8 06/01/2020    HGB 15.7 (H) 06/01/2020    HCT 49.6 (H) 06/01/2020    MCV 89.4 06/01/2020     06/01/2020     06/01/2020    K 4.5 06/01/2020     06/01/2020    CO2 22 06/01/2020    BUN 11 06/01/2020    CREATININE 0.78 06/01/2020    LABALBU 4.5 06/01/2020    BILITOT 0.45 06/01/2020    ALKPHOS 51 06/01/2020    AST 14 06/01/2020    ALT 10 06/01/2020         Lab Results   Component Value Date    RBC 5.55 (H) 06/01/2020    HGB 15.7 (H) 06/01/2020    MCV 89.4 06/01/2020    MCH 28.3 06/01/2020    MCHC 31.7 06/01/2020    RDW 13.2 06/01/2020    MPV 10.3 06/01/2020    BASOPCT 1 06/01/2020    LYMPHSABS 2.90 06/01/2020    MONOSABS 0.72 06/01/2020    NEUTROABS 6.95 06/01/2020    EOSABS 0.11 06/01/2020    BASOSABS 0.05 06/01/2020         DIAGNOSTIC TESTING:     No results found. IMPRESSION:  Ronny Franco a 39 y. o. female with a past history remarkable for diarrhea, referred for evaluation of of the symptoms.  Patient was identified to have multiple food allergens (potential) which include pepper, carrots, grapes, lettuce, navy bean, rye, soybean, tomato, wheat, potato, oat, rice, corn, and cabbage.  After avoiding all of these potential food allergens patient has reported significant GI improvement with no further episodes of diarrhea or GI discomfort.  Patient was referred to an allergist to identify true allergens and to potentially reintroduce sesame these potential allergens into her normal dietary pattern. Last Wednesday night--acute onset of lower abdominal cramping-- intermittent constipation and diarrhea since onset of symptoms with associated fecal urgency. The patient denies any melena, hematochezia, no bright blood per rectum. Reports some mild oily appearing stool. Some mucus discharge reported by the patient. Denies any preceding infectious symptoms. No significant upper GI symptoms aside from mild reflux    Assessment  1. Diarrhea, unspecified type    2. Gastroesophageal reflux disease, unspecified whether esophagitis present        PLAN:    1) abdominal cramping with associated intermittent diarrhea-continue Bentyl 10 mg every 6 as needed    2) plan for diagnostic EGD and Colonoscopy. Patient explained risk benefits and alternatives. Agreed to proceed with the procedure. 3) alternating bowel habits with diarrhea and constipation with increased fecal urgency-trial of Metamucil. Patient advised to stop supplementation should she have worsening of her symptoms. Will send stool studies. 4) mild GERD-like symptoms-advised looking cessation and avoidance of GERD triggers. Will start Prilosec  20 milligrams daily. Thank you for allowing me to participate in the care of Ms. Kahlil. For any further questions please do not hesitate to contact me. I have reviewed and agree with the ROS entered by the MA/LPN from today's encounter documented in a separate note. Misti Wadsworth MD, MPH   [facility]  Office #: [phone]    this note is created with the assistance of a speech recognition program.  While intending to generate a document that actually reflects the content of the visit, the document can still have some errors including those of syntax and sound a like substitutions which may escape proof reading. It such instances, actual meaning can be extrapolated by contextual diversion.

## 2021-08-24 ENCOUNTER — HOSPITAL ENCOUNTER (OUTPATIENT)
Age: 36
Discharge: HOME | End: 2021-08-24
Payer: COMMERCIAL

## 2021-08-24 DIAGNOSIS — K21.9 GASTROESOPHAGEAL REFLUX DISEASE, UNSPECIFIED WHETHER ESOPHAGITIS PRESENT: ICD-10-CM

## 2021-08-24 DIAGNOSIS — R19.7 DIARRHEA, UNSPECIFIED TYPE: ICD-10-CM

## 2021-08-24 PROCEDURE — 83993 ASSAY FOR CALPROTECTIN FECAL: CPT

## 2021-08-24 PROCEDURE — 83520 IMMUNOASSAY QUANT NOS NONAB: CPT

## 2021-08-27 ENCOUNTER — HOSPITAL ENCOUNTER (OUTPATIENT)
Dept: LAB | Age: 36
Setting detail: SPECIMEN
Discharge: HOME | End: 2021-08-27
Payer: COMMERCIAL

## 2021-08-27 ENCOUNTER — HOSPITAL ENCOUNTER (OUTPATIENT)
Age: 36
Setting detail: SPECIMEN
Discharge: HOME | End: 2021-08-27
Payer: COMMERCIAL

## 2021-08-27 DIAGNOSIS — Z20.822 COVID-19 RULED OUT BY LABORATORY TESTING: Primary | ICD-10-CM

## 2021-08-27 LAB
CALPROTECTIN, FECAL: 18 UG/G
FECAL PANCREATIC ELASTASE-1: 476 UG/G

## 2021-08-27 PROCEDURE — U0003 INFECTIOUS AGENT DETECTION BY NUCLEIC ACID (DNA OR RNA); SEVERE ACUTE RESPIRATORY SYNDROME CORONAVIRUS 2 (SARS-COV-2) (CORONAVIRUS DISEASE [COVID-19]), AMPLIFIED PROBE TECHNIQUE, MAKING USE OF HIGH THROUGHPUT TECHNOLOGIES AS DESCRIBED BY CMS-2020-01-R: HCPCS

## 2021-08-27 PROCEDURE — U0005 INFEC AGEN DETEC AMPLI PROBE: HCPCS

## 2021-08-30 ENCOUNTER — ANESTHESIA EVENT (OUTPATIENT)
Dept: OPERATING ROOM | Age: 36
End: 2021-08-30
Payer: COMMERCIAL

## 2021-08-31 ENCOUNTER — HOSPITAL ENCOUNTER (OUTPATIENT)
Age: 36
Setting detail: OUTPATIENT SURGERY
Discharge: HOME | End: 2021-08-31
Attending: INTERNAL MEDICINE | Admitting: INTERNAL MEDICINE
Payer: COMMERCIAL

## 2021-08-31 ENCOUNTER — ANESTHESIA (OUTPATIENT)
Dept: OPERATING ROOM | Age: 36
End: 2021-08-31
Payer: COMMERCIAL

## 2021-08-31 VITALS
BODY MASS INDEX: 20.89 KG/M2 | SYSTOLIC BLOOD PRESSURE: 125 MMHG | DIASTOLIC BLOOD PRESSURE: 89 MMHG | WEIGHT: 130 LBS | HEART RATE: 51 BPM | RESPIRATION RATE: 13 BRPM | TEMPERATURE: 96.7 F | OXYGEN SATURATION: 100 % | HEIGHT: 66 IN

## 2021-08-31 VITALS
DIASTOLIC BLOOD PRESSURE: 55 MMHG | SYSTOLIC BLOOD PRESSURE: 82 MMHG | RESPIRATION RATE: 15 BRPM | OXYGEN SATURATION: 99 %

## 2021-08-31 LAB
HCG, PREGNANCY URINE (POC): NEGATIVE
SARS-COV-2: NORMAL
SARS-COV-2: NOT DETECTED
SOURCE: NORMAL

## 2021-08-31 PROCEDURE — 2580000003 HC RX 258: Performed by: ANESTHESIOLOGY

## 2021-08-31 PROCEDURE — 81025 URINE PREGNANCY TEST: CPT

## 2021-08-31 PROCEDURE — 2709999900 HC NON-CHARGEABLE SUPPLY: Performed by: INTERNAL MEDICINE

## 2021-08-31 PROCEDURE — 6360000002 HC RX W HCPCS: Performed by: NURSE ANESTHETIST, CERTIFIED REGISTERED

## 2021-08-31 PROCEDURE — 3609010300 HC COLONOSCOPY W/BIOPSY SINGLE/MULTIPLE: Performed by: INTERNAL MEDICINE

## 2021-08-31 PROCEDURE — 7100000011 HC PHASE II RECOVERY - ADDTL 15 MIN: Performed by: INTERNAL MEDICINE

## 2021-08-31 PROCEDURE — 7100000000 HC PACU RECOVERY - FIRST 15 MIN: Performed by: INTERNAL MEDICINE

## 2021-08-31 PROCEDURE — 3700000000 HC ANESTHESIA ATTENDED CARE: Performed by: INTERNAL MEDICINE

## 2021-08-31 PROCEDURE — 7100000001 HC PACU RECOVERY - ADDTL 15 MIN: Performed by: INTERNAL MEDICINE

## 2021-08-31 PROCEDURE — 2500000003 HC RX 250 WO HCPCS: Performed by: NURSE ANESTHETIST, CERTIFIED REGISTERED

## 2021-08-31 PROCEDURE — 88305 TISSUE EXAM BY PATHOLOGIST: CPT

## 2021-08-31 PROCEDURE — 3700000001 HC ADD 15 MINUTES (ANESTHESIA): Performed by: INTERNAL MEDICINE

## 2021-08-31 PROCEDURE — 7100000010 HC PHASE II RECOVERY - FIRST 15 MIN: Performed by: INTERNAL MEDICINE

## 2021-08-31 PROCEDURE — 3609012400 HC EGD TRANSORAL BIOPSY SINGLE/MULTIPLE: Performed by: INTERNAL MEDICINE

## 2021-08-31 PROCEDURE — 45380 COLONOSCOPY AND BIOPSY: CPT | Performed by: INTERNAL MEDICINE

## 2021-08-31 PROCEDURE — 43239 EGD BIOPSY SINGLE/MULTIPLE: CPT | Performed by: INTERNAL MEDICINE

## 2021-08-31 RX ORDER — FENTANYL CITRATE 50 UG/ML
25 INJECTION, SOLUTION INTRAMUSCULAR; INTRAVENOUS EVERY 5 MIN PRN
Status: DISCONTINUED | OUTPATIENT
Start: 2021-08-31 | End: 2021-08-31 | Stop reason: HOSPADM

## 2021-08-31 RX ORDER — SODIUM CHLORIDE 0.9 % (FLUSH) 0.9 %
10 SYRINGE (ML) INJECTION PRN
Status: DISCONTINUED | OUTPATIENT
Start: 2021-08-31 | End: 2021-08-31 | Stop reason: HOSPADM

## 2021-08-31 RX ORDER — HYDROCODONE BITARTRATE AND ACETAMINOPHEN 5; 325 MG/1; MG/1
1 TABLET ORAL PRN
Status: DISCONTINUED | OUTPATIENT
Start: 2021-08-31 | End: 2021-08-31 | Stop reason: HOSPADM

## 2021-08-31 RX ORDER — SODIUM CHLORIDE 0.9 % (FLUSH) 0.9 %
10 SYRINGE (ML) INJECTION EVERY 12 HOURS SCHEDULED
Status: DISCONTINUED | OUTPATIENT
Start: 2021-08-31 | End: 2021-08-31 | Stop reason: HOSPADM

## 2021-08-31 RX ORDER — HYDROCODONE BITARTRATE AND ACETAMINOPHEN 5; 325 MG/1; MG/1
2 TABLET ORAL PRN
Status: DISCONTINUED | OUTPATIENT
Start: 2021-08-31 | End: 2021-08-31 | Stop reason: HOSPADM

## 2021-08-31 RX ORDER — DIPHENHYDRAMINE HYDROCHLORIDE 50 MG/ML
12.5 INJECTION INTRAMUSCULAR; INTRAVENOUS
Status: DISCONTINUED | OUTPATIENT
Start: 2021-08-31 | End: 2021-08-31 | Stop reason: HOSPADM

## 2021-08-31 RX ORDER — MEPERIDINE HYDROCHLORIDE 50 MG/ML
12.5 INJECTION INTRAMUSCULAR; INTRAVENOUS; SUBCUTANEOUS EVERY 5 MIN PRN
Status: DISCONTINUED | OUTPATIENT
Start: 2021-08-31 | End: 2021-08-31 | Stop reason: HOSPADM

## 2021-08-31 RX ORDER — MORPHINE SULFATE 1 MG/ML
1 INJECTION, SOLUTION EPIDURAL; INTRATHECAL; INTRAVENOUS EVERY 5 MIN PRN
Status: DISCONTINUED | OUTPATIENT
Start: 2021-08-31 | End: 2021-08-31 | Stop reason: HOSPADM

## 2021-08-31 RX ORDER — LIDOCAINE HYDROCHLORIDE 10 MG/ML
INJECTION, SOLUTION EPIDURAL; INFILTRATION; INTRACAUDAL; PERINEURAL PRN
Status: DISCONTINUED | OUTPATIENT
Start: 2021-08-31 | End: 2021-08-31 | Stop reason: SDUPTHER

## 2021-08-31 RX ORDER — ONDANSETRON 2 MG/ML
4 INJECTION INTRAMUSCULAR; INTRAVENOUS
Status: DISCONTINUED | OUTPATIENT
Start: 2021-08-31 | End: 2021-08-31 | Stop reason: HOSPADM

## 2021-08-31 RX ORDER — SODIUM CHLORIDE, SODIUM LACTATE, POTASSIUM CHLORIDE, CALCIUM CHLORIDE 600; 310; 30; 20 MG/100ML; MG/100ML; MG/100ML; MG/100ML
INJECTION, SOLUTION INTRAVENOUS CONTINUOUS
Status: DISCONTINUED | OUTPATIENT
Start: 2021-08-31 | End: 2021-08-31 | Stop reason: HOSPADM

## 2021-08-31 RX ORDER — LIDOCAINE HYDROCHLORIDE 10 MG/ML
1 INJECTION, SOLUTION EPIDURAL; INFILTRATION; INTRACAUDAL; PERINEURAL
Status: DISCONTINUED | OUTPATIENT
Start: 2021-08-31 | End: 2021-08-31 | Stop reason: HOSPADM

## 2021-08-31 RX ORDER — SODIUM CHLORIDE 9 MG/ML
25 INJECTION, SOLUTION INTRAVENOUS PRN
Status: DISCONTINUED | OUTPATIENT
Start: 2021-08-31 | End: 2021-08-31 | Stop reason: HOSPADM

## 2021-08-31 RX ORDER — PROPOFOL 10 MG/ML
INJECTION, EMULSION INTRAVENOUS PRN
Status: DISCONTINUED | OUTPATIENT
Start: 2021-08-31 | End: 2021-08-31 | Stop reason: SDUPTHER

## 2021-08-31 RX ORDER — GLYCOPYRROLATE 1 MG/5 ML
SYRINGE (ML) INTRAVENOUS PRN
Status: DISCONTINUED | OUTPATIENT
Start: 2021-08-31 | End: 2021-08-31 | Stop reason: SDUPTHER

## 2021-08-31 RX ORDER — HYDRALAZINE HYDROCHLORIDE 20 MG/ML
5 INJECTION INTRAMUSCULAR; INTRAVENOUS EVERY 10 MIN PRN
Status: DISCONTINUED | OUTPATIENT
Start: 2021-08-31 | End: 2021-08-31 | Stop reason: HOSPADM

## 2021-08-31 RX ADMIN — SODIUM CHLORIDE, POTASSIUM CHLORIDE, SODIUM LACTATE AND CALCIUM CHLORIDE: 600; 310; 30; 20 INJECTION, SOLUTION INTRAVENOUS at 09:00

## 2021-08-31 RX ADMIN — PROPOFOL INJECTABLE EMULSION 450 MG: 10 INJECTION, EMULSION INTRAVENOUS at 10:40

## 2021-08-31 RX ADMIN — Medication 0.2 MG: at 10:38

## 2021-08-31 RX ADMIN — LIDOCAINE HYDROCHLORIDE 50 MG: 10 INJECTION, SOLUTION EPIDURAL; INFILTRATION; INTRACAUDAL; PERINEURAL at 10:37

## 2021-08-31 ASSESSMENT — PULMONARY FUNCTION TESTS
PIF_VALUE: 0
PIF_VALUE: 1
PIF_VALUE: 0
PIF_VALUE: 2
PIF_VALUE: 0
PIF_VALUE: 1
PIF_VALUE: 0
PIF_VALUE: 0
PIF_VALUE: 2
PIF_VALUE: 0

## 2021-08-31 ASSESSMENT — LIFESTYLE VARIABLES: SMOKING_STATUS: 1

## 2021-08-31 NOTE — OP NOTE
Operative Note      Patient: Abigail Peraza  YOB: 1985  MRN: [id number]    Date of Procedure: 8/31/2021    Pre-Op Diagnosis: R19.7 DIARRHEA   K30  DYPEPSIA   K21.9  GERD    Post-Op Diagnosis: Esophageal inlet patch, gastritis, unremarkable colon and ileum       Procedure(s):  EGD BIOPSY  COLONOSCOPY WITH BIOPSY    Surgeon(s):  Mary Ann Perez MD    Assistant:   * No surgical staff found *    Anesthesia: Monitor Anesthesia Care    Estimated Blood Loss (mL): Minimal    Complications: None    Specimens:   ID Type Source Tests Collected by Time Destination   A : stomach biopsy  Tissue Stomach SURGICAL PATHOLOGY Mary Ann Perez MD 8/31/2021 1045    B : random right colon biopsy  Tissue Colon SURGICAL PATHOLOGY Mary Ann Perez MD 8/31/2021 1057    C : random left colon biopsy  Tissue Colon SURGICAL PATHOLOGY Mary Ann Perez MD 8/31/2021 1058        Implants:  * No implants in log *      Drains: * No LDAs found *          [facility]    [facility]    EGD    PROCEDURE DATE: 08/31/21    REFERRING PHYSICIAN: No ref. provider found     PRIMARY CARE PROVIDER: Kenneth Mckeon MD    ATTENDING PHYSICIAN: Mary Ann Perez MD     HISTORY: Ms. Abigail Peraza is a 39 y.o. female who presents to the  Endoscopy unit for upper endoscopy. The patient's clinical history is remarkable for anxiety, fatigue, chronic diarrhea, multiple food sensitivities, referred for diagnostic EGD and colonoscopy. She is currently medically stable and appropriate for the planned procedure. PREOPERATIVE DIAGNOSIS: Chronic diarrhea. PROCEDURES:   1) Transoral Upper Endoscopy with cold biopsy of the stomach. POSTOPERATIVE DIAGNOSIS:    1) Mild to moderate non-specific gastritis. No ulcerations, no erosions. MEDICATIONS:   MAC per anesthesia     EBL: <10cc    INSTRUMENT: Olympus GIF-H190  flexible Gastroscope.      PREPARATION: The nature and character of the procedure as well as risks, benefits, and alternatives were discussed with the patient and informed consent was obtained. Complications were said to include, but were not limited to: medication allergy, medication reaction, cardiovascular and respiratory problems, bleeding, perforation, infection, and/or missed diagnosis. Following arrival in the endoscopy room, the patient was placed in the left lateral decubitus position and final time-out accomplished in the presence of the nursing staff. Baseline vital signs were obtained and reviewed, and IV sedation was subsequently initiated. FINDINGS:   Esophagus: The esophagus was inspected to the Z-line. The endoscopic exam showed small proximal esophageal inlet patch. Stomach: The stomach was inspected in both forward and retroflex fashion and was appropriately distensible. The cardia, fundus, incisura, antrum and pylorus were identified via direct visualization. The endoscopic exam showed mild to moderate gastritis. Duodenum: The proximal small bowel was inspected through the bulb, sweep, and second portion of the duodenum. The endoscopic exam showed normal appearing duodenum. IMPRESSION:    1) Mild to moderate non-specific gastritis. No ulcerations, no erosions. RECOMMENDATIONS:   1) Follow up path in GI clinic  2) Proceed with colonoscopy      [facility]   08/31/21    this note is created with the assistance of a speech recognition program.  While intending to generate a document that actually reflects the content of the visit, the document can still have some errors including those of syntax and sound a like substitutions which may escape proof reading. It such instances, actual meaning can be extrapolated by contextual diversion. The patient was counseled at length about the risks of contreras Covid-19 during their perioperative period and any recovery window from their procedure.   The patient was made aware that contreras Covid-19  may worsen their prognosis for recovering from their procedure  and lend to a higher morbidity and/or mortality risk. All material risks, benefits, and reasonable alternatives including postponing the procedure were discussed. The patient DOES wish to proceed with the procedure at this time. [facility]     [facility]     COLONOSCOPY    PROCEDURE DATE: 08/31/21    REFERRING PHYSICIAN: No ref. provider found     PRIMARY CARE PROVIDER: Nely Weinstein MD    ATTENDING PHYSICIAN: Jessica Juarez MD     HISTORY: Ms. Merline Leslie is a 39 y.o. female who presents to the  endoscopy unit for colonoscopy. The patient's clinical history is remarkable for history as detailed above. She is currently medically stable and appropriate for the planned procedure. PREOPERATIVE DIAGNOSIS: chronic diarrhea of 1 year duration. PROCEDURES:   Transanal Colonoscopy with biopsy. POSTPROCEDURE DIAGNOSIS:    FAIR PREP     1) No evidence of colitis, no ulcerations, no erosions, no aphthous ulcerations in ileum. 2) Benign lymphoid hyperplasia in the terminal ileum  3) Random right and left colon cold biopsies taken to evaluate for microscopic colitis  4) Mild to moderate externa hemorrhoids    MEDICATIONS:     MAC per anesthesia     EBL <10cc      INSTRUMENT: Olympus CF-H180 AL Pediatric flexible Colonoscope. PREPARATION: The nature and character of the procedure as well as risks, benefits, and alternatives were discussed with the patient and informed consent was obtained. Complications were said to include, but were not limited to: medication allergy, medication reaction, cardiovascular and respiratory problems, bleeding, perforation, infection, and/or missed diagnosis. Following arrival in the endoscopy room, the patient was placed in the left lateral decubitus position and final time-out accomplished in the presence of the nursing staff.  Baseline vital signs were obtained Covid-19  may worsen their prognosis for recovering from their procedure  and lend to a higher morbidity and/or mortality risk. All material risks, benefits, and reasonable alternatives including postponing the procedure were discussed. The patient DOES wish to proceed with the procedure at this time.       Electronically signed by Satinder Harley MD on 8/31/2021 at 11:10 AM

## 2021-08-31 NOTE — H&P
Procedure History and Physical    Pre-Procedural Diagnosis:  Diarrhea and dyspepsia    Indications:  same    Procedure Planned: endoscopy and colonoscopy     History Obtained From:  patient    HISTORY OF PRESENT ILLNESS:       The patient is a 39 y.o. female who presents for the above procedure. Past Medical History:    Past Medical History:   Diagnosis Date    Abdominal pain 3/27/2015    Acid reflux     Allergic reaction to allergy skin test 09/2020    legumes, wheat, peanuts almonds    Anxiety     Fibromyalgia     Headache     migraines    HPV (human papilloma virus) anogenital infection     Insomnia     Lumbar radicular pain 9/7/2018    Movement disorder     spasms, B&B    Mycoplasma infection 9/4/2015    Neuropathy     left hands, feet    Numbness and tingling 12/19/2017    Post partum depression 3/27/2015    Rheumatic fever     Spinal stenosis     Wears glasses        Past Surgical History:    Past Surgical History:   Procedure Laterality Date   [address]any Dr. Aziza Turner in [address]      removed some teeth at age 1   [employer] INTRAUTERINE DEVICE INSERTION  2017    TONSILLECTOMY AND ADENOIDECTOMY Bilateral 01/07/2016       Medications:  Current Facility-Administered Medications   Medication Dose Route Frequency Provider Last Rate Last Admin    lactated ringers infusion   IntraVENous Continuous Satinder Rene MD        sodium chloride flush 0.9 % injection 10 mL  10 mL IntraVENous 2 times per day Satinder Rene MD        sodium chloride flush 0.9 % injection 10 mL  10 mL IntraVENous PRN Satinder Rene MD        0.9 % sodium chloride infusion  25 mL IntraVENous PRN Satinder Rene MD        lidocaine PF 1 % injection 1 mL  1 mL IntraDERmal Once PRN Satinder Rene MD           Allergies:    Allergies   Allergen Reactions    Latex Other (See Comments)     Redness and swelling    Banana Anaphylaxis     Lips swell  Banana Extract Allergy Skin Test Anaphylaxis    [place] (Diagnostic)     Avocado     Buckwheat     Coconut Fatty Acids     Coconut Oil     Kiwi Extract     Garret Flavor [Flavoring Agent]     Other      Green peas, lima beans     Papaya Derivatives     Peanut (Diagnostic)     Tape [place] Tape] Other (See Comments)     Plastic tape causes skin irritation    Wheat Bran     Maxalt [Rizatriptan Benzoate] Nausea And Vomiting                 Social   Social History     Tobacco Use    Smoking status: Current Every Day Smoker     Packs/day: 0.50     Years: 17.00     Pack years: 8.50     Types: Cigarettes     Start date: 6/17/2004    Smokeless tobacco: Never Used    Tobacco comment: is down to a half pack per day   Substance Use Topics    Alcohol use: Yes     Alcohol/week: 4.0 standard drinks     Types: 4 Cans of beer per week        PSYCH HISTORY:  Depression No  Anxiety No  Suicide No       Family History   Problem Relation Age of Onset    Depression Mother     Diabetes Father     High Blood Pressure Father     High Cholesterol Father     Obesity Father     Other Father     Asthma Brother     Other Brother         Peanut allergies and multiple other allergies    Depression Brother     No Known Problems Maternal Grandmother     No Known Problems Maternal Grandfather     High Blood Pressure Paternal Grandmother     Breast Cancer Paternal Grandmother     High Cholesterol Paternal Grandmother     No Known Problems Paternal Grandfather     Colon Cancer Paternal Uncle     Uterine Cancer Neg Hx     Ovarian Cancer Neg Hx     Heart Disease Neg Hx     Stroke Neg Hx       No family history of colon cancer, Crohn's disease, or ulcerative colitis    Problems with Sedation/Anesthesia in the past? no    REVIEW OF SYSTEMS:  12 point review of systems negative other than mentioned above.       PHYSICAL EXAM:    Vitals:  /74   Pulse 61   Temp 97.6 °F (36.4 °C)   Resp 13   Ht 5' 6\" (1.676 m) Wt 130 lb (59 kg)   SpO2 100%   BMI 20.98 kg/m²     Focused Exam related to procedure:    General appearance: NAD, conversant   Eyes: anicteric sclerae, moist conjunctivae; no lid-lag; PERRLA   Lungs: CTA, with normal respiratory effort and no intercostal retractions   CV: RRR, no MRGs   Abdomen: Soft, non-tender; no masses or HSM   Skin: Normal temperature, turgor and texture; no rash, ulcers or subcutaneous nodules     DATA:  CBC:   Lab Results   Component Value Date    WBC 10.8 06/01/2020    HGB 15.7 (H) 06/01/2020    HCT 49.6 (H) 06/01/2020    MCV 89.4 06/01/2020     06/01/2020     BUN/Cr:   Lab Results   Component Value Date    BUN 11 06/01/2020   ,   Lab Results   Component Value Date    CREATININE 0.78 06/01/2020     Potassium:   Lab Results   Component Value Date    K 4.5 06/01/2020     PT/INR: No results found for: INR, PROTIME    ASSESSMENT AND PLAN:       1. Patient is a 39 y.o. female with above specified procedure planned. Expected Sedation/Anesthesia Type: MAC    2. ASA ([address] Anesthesiology) Anesthesia Status: Class 2 - A normal healthy patient with mild systemic disease    3. Mallampati: II (soft palate, uvula, fauces visible)  4. Procedure options, risks and benefits reviewed with Patient. Patient expresses understanding.     5.  Consent has been signed:  Yes    Zachary Mayorga MD

## 2021-08-31 NOTE — ANESTHESIA POSTPROCEDURE EVALUATION
POST- ANESTHESIA EVALUATION       Pt Name: Rupinder Drake  MRN: [id number]  YOB: 1985  Date of evaluation: 8/31/2021  Time:  12:43 PM      /89   Pulse 51   Temp 96.7 °F (35.9 °C) (Temporal)   Resp 13   Ht 5' 6\" (1.676 m)   Wt 130 lb (59 kg)   SpO2 100%   BMI 20.98 kg/m²      Consciousness Level  Awake  Cardiopulmonary Status  Stable  Pain Adequately Treated YES  Nausea / Vomiting  NO  Adequate Hydration  YES  Anesthesia Related Complications NONE      Electronically signed by Shanda Wilson MD on 8/31/2021 at 12:43 PM       Department of Anesthesiology  Postprocedure Note    Patient: Rupinder Drake  MRN: [id number]  YOB: 1985  Date of evaluation: 8/31/2021  Time:  12:43 PM     Procedure Summary     Date: 08/31/21 Room / Location: [address] [place] [address] / [address] [place] [address]    Anesthesia Start: 3797 Anesthesia Stop: 1108    Procedures:       EGD BIOPSY (N/A )      COLONOSCOPY WITH BIOPSY (N/A ) Diagnosis: (R19.7 DIARRHEA   K30  DYPEPSIA   K21.9  GERD)    Surgeons: Mao Augustin MD Responsible Provider: Shanda Wilson MD    Anesthesia Type: MAC ASA Status: 2          Anesthesia Type: MAC    Zach Phase I: Zach Score: 9    Zach Phase II: Zach Score: 9    Last vitals: Reviewed and per EMR flowsheets.        Anesthesia Post Evaluation

## 2021-08-31 NOTE — ANESTHESIA PRE PROCEDURE
Department of Anesthesiology  Preprocedure Note       Name:  Sanjay Crowley   Age:  39 y.o.  :  1985                                          MRN:  [id number]         Date:  2021      Surgeon: Anshul Eason):  Marilyn Pearson MD    Procedure: Procedure(s):  EGD BIOPSY  COLONOSCOPY DIAGNOSTIC    Medications prior to admission:   Prior to Admission medications    Medication Sig Start Date End Date Taking?  Authorizing Provider   psyllium (METAMUCIL) 28 % packet Take 1 packet by mouth daily Take with full glass of h20 or juice 21 Yes Marilyn Pearson MD   polyethylene glycol [facility]) 17 GM/SCOOP powder Follow Instructions provided by physician's office 21  Yes Marilyn Pearson MD   bisacodyl (BISACODYL) 5 MG EC tablet Take 4 tablets in the morning 21  Yes Marilyn Pearson MD   topiramate (TOPAMAX) 25 MG tablet 50mg AM, 25mg PM 21  Yes Carmelita Arthur MD   QUEtiapine (SEROQUEL) 25 MG tablet 12.5mg to 25mg QHS PRN 21  Yes Carmelita Arthur MD   cetirizine (ZYRTEC) 10 MG tablet  21  Yes Historical Provider, MD   omeprazole (PRILOSEC) 20 MG delayed release capsule Take 1 capsule by mouth daily 21   Marilyn Pearson MD   EPINEPHrine (EPIPEN 2-KALI) 0.3 MG/0.3ML SOAJ injection Inject 0.3 mg into the muscle as needed Use as directed for allergic reaction    Historical Provider, MD       Current medications:    Current Facility-Administered Medications   Medication Dose Route Frequency Provider Last Rate Last Admin    lactated ringers infusion   IntraVENous Continuous Anastasiia Shah MD        sodium chloride flush 0.9 % injection 10 mL  10 mL IntraVENous 2 times per day Anastasiia Shah MD        sodium chloride flush 0.9 % injection 10 mL  10 mL IntraVENous PRN Anastasiia Shah MD        0.9 % sodium chloride infusion  25 mL IntraVENous PRN Anastasiia Shah MD        lidocaine PF 1 % injection 1 mL  1 mL IntraDERmal Once PRN Anastasiia Shah MD           Allergies: Allergies   Allergen Reactions    Latex Other (See Comments)     Redness and swelling    Banana Anaphylaxis     Lips swell    Banana Extract Allergy Skin Test Anaphylaxis    [place] (Diagnostic)     Avocado     Buckwheat     Coconut Fatty Acids     Coconut Oil     Kiwi Extract     [place] Flavor [Flavoring Agent]     Other      Green peas, lima beans     Papaya Derivatives     Peanut (Diagnostic)     Tape Conrad Guarneri Tape] Other (See Comments)     Plastic tape causes skin irritation    Wheat Bran     Maxalt [Rizatriptan Benzoate] Nausea And Vomiting       Problem List:    Patient Active Problem List   Diagnosis Code    Anxiety F41.9    Nasal congestion R09.81    HPV (human papilloma virus) anogenital infection A63.0    Psychophysiological insomnia F51.04    Fatigue R53.83    Chronic low back pain with bilateral sciatica M54.41, M54.42, G89.29    Spinal stenosis M48.00    Hemangioma of liver D18.03    Chronic bilateral low back pain with bilateral sciatica M54.42, M54.41, G89.29    Numbness and tingling of both lower extremities R20.0, R20.2    Fibromyalgia M79.7    Osteoarthritis M19.90    Major depressive disorder, recurrent, moderate (HCC) F33.1    Hypokalemia E87.6    Tobacco abuse Z72.0    Weight loss due to medication R63.4, T50.905A       Past Medical History:        Diagnosis Date    Abdominal pain 3/27/2015    Acid reflux     Allergic reaction to allergy skin test 09/2020    legumes, wheat, peanuts almonds    Anxiety     Fibromyalgia     Headache     migraines    HPV (human papilloma virus) anogenital infection     Insomnia     Lumbar radicular pain 9/7/2018    Movement disorder     spasms, B&B    Mycoplasma infection 9/4/2015    Neuropathy     left hands, feet    Numbness and tingling 12/19/2017    Post partum depression 3/27/2015    Rheumatic fever     Spinal stenosis     Wears glasses        Past Surgical History:        Procedure Laterality Date    [place] in [address]      removed some teeth at age 1   [employer] INTRAUTERINE DEVICE INSERTION  2017    TONSILLECTOMY AND ADENOIDECTOMY Bilateral 01/07/2016       Social History:    Social History     Tobacco Use    Smoking status: Current Every Day Smoker     Packs/day: 0.50     Years: 17.00     Pack years: 8.50     Types: Cigarettes     Start date: 6/17/2004    Smokeless tobacco: Never Used    Tobacco comment: is down to a half pack per day   Substance Use Topics    Alcohol use: Yes     Alcohol/week: 4.0 standard drinks     Types: 4 Cans of beer per week                                Ready to quit: Not Answered  Counseling given: Not Answered  Comment: is down to a half pack per day      Vital Signs (Current):   Vitals:    08/31/21 0818   BP: 109/74   Pulse: 61   Resp: 13   Temp: 97.6 °F (36.4 °C)   SpO2: 100%   Weight: 130 lb (59 kg)   Height: 5' 6\" (1.676 m)                                              BP Readings from Last 3 Encounters:   08/31/21 109/74   08/23/21 111/62   07/26/21 105/69       NPO Status:                                                                                 BMI:   Wt Readings from Last 3 Encounters:   08/31/21 130 lb (59 kg)   08/23/21 138 lb (62.6 kg)   07/26/21 135 lb (61.2 kg)     Body mass index is 20.98 kg/m².     CBC:   Lab Results   Component Value Date    WBC 10.8 06/01/2020    RBC 5.55 06/01/2020    RBC 4.67 03/14/2019    HGB 15.7 06/01/2020    HCT 49.6 06/01/2020    MCV 89.4 06/01/2020    RDW 13.2 06/01/2020     06/01/2020       CMP:   Lab Results   Component Value Date     06/01/2020    K 4.5 06/01/2020     06/01/2020    CO2 22 06/01/2020    BUN 11 06/01/2020    CREATININE 0.78 06/01/2020    GFRAA >60 06/01/2020    LABGLOM >60 06/01/2020    GLUCOSE 85 06/01/2020    GLUCOSE 84 07/11/2019    PROT 7.0 06/01/2020    PROT 6.8 03/14/2019    PROT 6.4 03/14/2019    CALCIUM 9.5 06/01/2020    BILLITOT 0.45 06/01/2020    ALKPHOS 51 06/01/2020    AST 14 06/01/2020    ALT 10 06/01/2020       POC Tests: No results for input(s): POCGLU, POCNA, POCK, POCCL, POCBUN, POCHEMO, POCHCT in the last 72 hours. Coags: No results found for: PROTIME, INR, APTT    HCG (If Applicable):   Lab Results   Component Value Date    PREGTESTUR NEGATIVE 07/11/2019    HCG NEGATIVE 08/31/2021        ABGs: No results found for: PHART, PO2ART, BQZ1PIY, JVX3NCO, BEART, K2WZUOVF     Type & Screen (If Applicable):  No results found for: LABABO, LABRH    Drug/Infectious Status (If Applicable):  No results found for: HIV, HEPCAB    COVID-19 Screening (If Applicable):   Lab Results   Component Value Date    COVID19 Not Detected 08/27/2021           Anesthesia Evaluation  Patient summary reviewed and Nursing notes reviewed no history of anesthetic complications:   Airway: Mallampati: I  TM distance: >3 FB   Neck ROM: full  Mouth opening: > = 3 FB Dental: normal exam         Pulmonary:normal exam  breath sounds clear to auscultation  (+) current smoker                           Cardiovascular:Negative CV ROS            Rhythm: regular  Rate: normal                    Neuro/Psych:   (+) neuromuscular disease:, headaches:, psychiatric history: stable with treatmentdepression/anxiety              ROS comment: Spinal stenosis, Chronic bilateral low back pain with bilateral sciatica  Numbness and tingling of both lower extremities     GI/Hepatic/Renal:   (+) GERD:, liver disease:, bowel prep,           Endo/Other: Negative Endo/Other ROS                    Abdominal:       Abdomen: soft. Vascular: negative vascular ROS. Other Findings:           Anesthesia Plan      MAC     ASA 2             Anesthetic plan and risks discussed with patient. Plan discussed with CRNA.                   Ervin Gibson MD   8/31/2021

## 2021-09-02 LAB — SURGICAL PATHOLOGY REPORT: NORMAL

## 2021-09-13 ENCOUNTER — APPOINTMENT (OUTPATIENT)
Dept: ULTRASOUND IMAGING | Facility: CLINIC | Age: 36
End: 2021-09-13
Payer: COMMERCIAL

## 2021-09-13 ENCOUNTER — PATIENT MESSAGE (OUTPATIENT)
Dept: FAMILY MEDICINE CLINIC | Age: 36
End: 2021-09-13

## 2021-09-13 ENCOUNTER — HOSPITAL ENCOUNTER (EMERGENCY)
Facility: CLINIC | Age: 36
Discharge: HOME | End: 2021-09-13
Attending: EMERGENCY MEDICINE
Payer: COMMERCIAL

## 2021-09-13 VITALS
HEART RATE: 71 BPM | HEIGHT: 66 IN | OXYGEN SATURATION: 99 % | RESPIRATION RATE: 14 BRPM | SYSTOLIC BLOOD PRESSURE: 108 MMHG | DIASTOLIC BLOOD PRESSURE: 71 MMHG | TEMPERATURE: 98.4 F | WEIGHT: 135 LBS | BODY MASS INDEX: 21.69 KG/M2

## 2021-09-13 DIAGNOSIS — L03.114 CELLULITIS OF LEFT UPPER EXTREMITY: Primary | ICD-10-CM

## 2021-09-13 PROCEDURE — 99283 EMERGENCY DEPT VISIT LOW MDM: CPT

## 2021-09-13 PROCEDURE — 93971 EXTREMITY STUDY: CPT

## 2021-09-13 PROCEDURE — 6370000000 HC RX 637 (ALT 250 FOR IP): Performed by: EMERGENCY MEDICINE

## 2021-09-13 RX ORDER — CEPHALEXIN 500 MG/1
500 CAPSULE ORAL 4 TIMES DAILY
Qty: 28 CAPSULE | Refills: 0 | Status: SHIPPED | OUTPATIENT
Start: 2021-09-13 | End: 2021-09-14 | Stop reason: ALTCHOICE

## 2021-09-13 RX ORDER — CEPHALEXIN 250 MG/1
500 CAPSULE ORAL ONCE
Status: COMPLETED | OUTPATIENT
Start: 2021-09-13 | End: 2021-09-13

## 2021-09-13 RX ORDER — DIPHENHYDRAMINE HCL 25 MG
25 CAPSULE ORAL EVERY 12 HOURS PRN
Qty: 12 CAPSULE | Refills: 0 | Status: SHIPPED | OUTPATIENT
Start: 2021-09-13 | End: 2021-09-14 | Stop reason: ALTCHOICE

## 2021-09-13 RX ADMIN — CEPHALEXIN 500 MG: 250 CAPSULE ORAL at 15:54

## 2021-09-13 ASSESSMENT — PAIN DESCRIPTION - DESCRIPTORS: DESCRIPTORS: BURNING

## 2021-09-13 ASSESSMENT — PAIN SCALES - GENERAL: PAINLEVEL_OUTOF10: 4

## 2021-09-13 ASSESSMENT — PAIN DESCRIPTION - LOCATION: LOCATION: ARM

## 2021-09-13 ASSESSMENT — PAIN DESCRIPTION - ORIENTATION: ORIENTATION: LEFT

## 2021-09-13 ASSESSMENT — PAIN DESCRIPTION - FREQUENCY: FREQUENCY: CONTINUOUS

## 2021-09-13 NOTE — ED PROVIDER NOTES
[facility]  [facility]  Phone: [phone]        Pt Name: Lacey Nolasco  MRN: [id number]  [place] 1985  Date of evaluation: 9/13/21      CHIEF COMPLAINT     Chief Complaint   Patient presents with    Insect Bite     rash         HISTORY OF PRESENT ILLNESS  (Location/Symptom, Timing/Onset, Context/Setting, Quality, Duration, Modifying Factors, Severity.)    Lacey Nolasco is a 39 y.o. female who presents concerns that she sustained an insect bite to her left forearm. Patient states the redness started radiating proximally, and she became more concerned. She is not sure what bit her. REVIEW OF SYSTEMS    (2-9 systems for level 4, 10 or more for level 5)     Constitutional: no fever, chills, fatigue  HENT: No headache, nasal congestion, sore throat, hearing changes, ear pain or discharge  Eyes: no visual changes or photophobia  Respiratory: no cough, shortness of breath, or wheezing  Cardiovascular: no chest pain, palpitations, or leg swelling  Abdominal: no abdominal pain, nausea, vomiting, diarrhea, or constipation  Genitourinary: no dysuria, frequency, or urgency  Musculoskeletal: no arthralgias, myalgias, neck or back pain  Skin: no rash or wound  Neurological: no numbness, tingling or weakness  Hematologic:  no history of easy bleeding or bruising            PAST MEDICAL HISTORY    has a past medical history of Abdominal pain, Acid reflux, Allergic reaction to allergy skin test, Anxiety, Fibromyalgia, Headache, HPV (human papilloma virus) anogenital infection, Insomnia, Lumbar radicular pain, Movement disorder, Mycoplasma infection, Neuropathy, Numbness and tingling, Post partum depression, Rheumatic fever, Spinal stenosis, and Wears glasses. SURGICAL HISTORY      has a past surgical history that includes Cholecystectomy; Dental surgery; Tonsillectomy and adenoidectomy (Bilateral, 01/07/2016); intrauterine device insertion (2017);  Colposcopy; Upper gastrointestinal endoscopy (2021); Colonoscopy (2021); Upper gastrointestinal endoscopy (N/A, 2021); and Colonoscopy (N/A, 2021). CURRENTMEDICATIONS       Previous Medications    BISACODYL (BISACODYL) 5 MG EC TABLET    Take 4 tablets in the morning    CETIRIZINE (ZYRTEC) 10 MG TABLET        EPINEPHRINE (EPIPEN 2-KALI) 0.3 MG/0.3ML SOAJ INJECTION    Inject 0.3 mg into the muscle as needed Use as directed for allergic reaction    OMEPRAZOLE (PRILOSEC) 20 MG DELAYED RELEASE CAPSULE    Take 1 capsule by mouth daily    POLYETHYLENE GLYCOL (GLYCOLAX) 17 GM/SCOOP POWDER    Follow Instructions provided by physician's office    PSYLLIUM (METAMUCIL) 28 % PACKET    Take 1 packet by mouth daily Take with full glass of h20 or juice    QUETIAPINE (SEROQUEL) 25 MG TABLET    12.5mg to 25mg QHS PRN    TOPIRAMATE (TOPAMAX) 25 MG TABLET    50mg AM, 25mg PM       ALLERGIES     is allergic to latex, banana, banana extract allergy skin test, almond (diagnostic), avocado, buckwheat, coconut fatty acids, coconut oil, kiwi extract, le flavor [flavoring agent], other, papaya derivatives, peanut (diagnostic), tape [adhesive tape], wheat bran, and maxalt [rizatriptan benzoate]. FAMILY HISTORY     She indicated that her mother is alive. She indicated that her father is alive. She indicated that her brother is alive. She indicated that her maternal grandmother is . She indicated that her maternal grandfather is . She indicated that her paternal grandmother is alive. She indicated that her paternal grandfather is . She indicated that her paternal uncle is alive. She indicated that the status of her neg hx is unknown. She indicated that her half-brother is alive.      family history includes Asthma in her brother; Breast Cancer in her paternal grandmother; Colon Cancer in her paternal uncle; Depression in her brother and mother; Diabetes in her father; High Blood Pressure in her father and paternal grandmother; High Cholesterol in her father and paternal grandmother; No Known Problems in her maternal grandfather, maternal grandmother, and paternal grandfather; Obesity in her father; Other in her brother and father. SOCIAL HISTORY      reports that she has been smoking cigarettes. She started smoking about 17 years ago. She has a 8.50 pack-year smoking history. She has never used smokeless tobacco. She reports current alcohol use of about 4.0 standard drinks of alcohol per week. She reports current drug use. Frequency: 7.00 times per week. Drug: Marijuana. PHYSICAL EXAM    (up to 7 for level 4, 8 or more for level 5)   INITIAL VITALS:  height is 5' 6\" (1.676 m) and weight is 61.2 kg (135 lb). Her oral temperature is 98.4 °F (36.9 °C). Her blood pressure is 108/71. Her respiration is 14 and oxygen saturation is 99%. Physical Exam  Vitals and nursing note reviewed. Constitutional:       Appearance: Normal appearance. She is not ill-appearing. HENT:      Head: Normocephalic and atraumatic. Musculoskeletal:         General: Swelling and tenderness present. Normal range of motion. Skin:     General: Skin is warm and dry. Capillary Refill: Capillary refill takes less than 2 seconds. Findings: Erythema present. Comments: Left volar forearm with erythema and a tender cord on the left    Neurological:      Mental Status: She is alert and oriented to person, place, and time. Mental status is at baseline. Psychiatric:         Mood and Affect: Mood normal.         Behavior: Behavior normal.         Thought Content: Thought content normal.         DIFFERENTIAL DIAGNOSIS/ MDM:     Cellulitis to the left volar forearm. Plan for antibiotics.      DIAGNOSTIC RESULTS     EKG: All EKG's are interpreted by the Emergency Department Physician who either signs or Co-signs this chart in the absence of a cardiologist.    none    RADIOLOGY:        Interpretation per the Radiologist below, if available at the time of this note:    US DUP UPPER EXTREMITY LEFT VENOUS    Result Date: 9/13/2021  EXAMINATION: VENOUS ULTRASOUND OF THE LEFT UPPER EXTREMITY, 9/13/2021 4:20 pm TECHNIQUE: Duplex ultrasound using B-mode/gray scaled imaging and Doppler spectral analysis and color flow was obtained of the deep venous structures of the upper extremity. COMPARISON: None HISTORY: ORDERING SYSTEM PROVIDED HISTORY: r/o DVT TECHNOLOGIST PROVIDED HISTORY: r/o DVT Reason for Exam: Left redness and swelling wrist/forearm s/p possible bug bite Acuity: Acute Type of Exam: Initial FINDINGS: There is normal flow and compressibility of the visualized venous structures. There is no evidence of echogenic thrombus. The veins demonstrate good compressibility with normal color flow study and spectral analysis. No evidence of DVT in the left upper extremity. LABS:  No results found for this visit on 09/13/21.    none    EMERGENCY DEPARTMENT COURSE:   Vitals:    Vitals:    09/13/21 1506   BP: 108/71   Resp: 14   Temp: 98.4 °F (36.9 °C)   TempSrc: Oral   SpO2: 99%   Weight: 61.2 kg (135 lb)   Height: 5' 6\" (1.676 m)     -------------------------  BP: 108/71, Temp: 98.4 °F (36.9 °C),  , Resp: 14      RE-EVALUATION:  5:10 PM EDT  Patient updated on results and plan of care. CONSULTS:  none    PROCEDURES:  None    FINAL IMPRESSION      1.  Cellulitis of left upper extremity          DISPOSITION/PLAN   DISPOSITION Decision To Discharge 09/13/2021 05:09:08 PM      CONDITION ON DISPOSITION:   Stable     PATIENT REFERRED TO:  MD Michelle Dotson [address] [place] [id number]  [phone]    Schedule an appointment as soon as possible for a visit in 2 days        DISCHARGE MEDICATIONS:  New Prescriptions    CEPHALEXIN (KEFLEX) 500 MG CAPSULE    Take 1 capsule by mouth 4 times daily for 7 days    DIPHENHYDRAMINE (BENADRYL) 25 MG CAPSULE    Take 1 capsule by mouth every 12 hours as needed for Itching       (Please note that portions of this note were completed with a voicerecognition program.  Efforts were made to edit the dictations but occasionally words are mis-transcribed.)    Brenden Barajas MD  Attending Emergency Medicine Physician        Brenden Barajas MD  09/13/21 [id number]

## 2021-09-13 NOTE — TELEPHONE ENCOUNTER
From: Abigail Peraza  To: Kenneth Mckeon MD  Sent: 9/13/2021 12:44 PM EDT  Subject: Non-Urgent Medical Question    I have a bite of some kind on my arm by the wrist and it is starting to spread a line up my forearm. It is at the crook of my elbow now. I don't know if should go to urgent care or an ER to have it checked out. I will attach a photo of it.      Nuha Doshi

## 2021-09-14 ENCOUNTER — TELEMEDICINE (OUTPATIENT)
Dept: FAMILY MEDICINE CLINIC | Age: 36
End: 2021-09-14
Payer: COMMERCIAL

## 2021-09-14 ENCOUNTER — TELEPHONE (OUTPATIENT)
Dept: FAMILY MEDICINE CLINIC | Age: 36
End: 2021-09-14

## 2021-09-14 DIAGNOSIS — L03.114 CELLULITIS OF LEFT UPPER EXTREMITY: Primary | ICD-10-CM

## 2021-09-14 PROCEDURE — 4004F PT TOBACCO SCREEN RCVD TLK: CPT | Performed by: STUDENT IN AN ORGANIZED HEALTH CARE EDUCATION/TRAINING PROGRAM

## 2021-09-14 PROCEDURE — G8420 CALC BMI NORM PARAMETERS: HCPCS | Performed by: STUDENT IN AN ORGANIZED HEALTH CARE EDUCATION/TRAINING PROGRAM

## 2021-09-14 PROCEDURE — G8427 DOCREV CUR MEDS BY ELIG CLIN: HCPCS | Performed by: STUDENT IN AN ORGANIZED HEALTH CARE EDUCATION/TRAINING PROGRAM

## 2021-09-14 PROCEDURE — 99213 OFFICE O/P EST LOW 20 MIN: CPT | Performed by: STUDENT IN AN ORGANIZED HEALTH CARE EDUCATION/TRAINING PROGRAM

## 2021-09-14 RX ORDER — DOXYCYCLINE HYCLATE 100 MG
100 TABLET ORAL 2 TIMES DAILY
Qty: 14 TABLET | Refills: 0 | Status: SHIPPED | OUTPATIENT
Start: 2021-09-14 | End: 2021-09-21

## 2021-09-14 RX ORDER — HYDROXYZINE HYDROCHLORIDE 25 MG/1
25 TABLET, FILM COATED ORAL EVERY 8 HOURS PRN
Qty: 30 TABLET | Refills: 0 | Status: SHIPPED | OUTPATIENT
Start: 2021-09-14 | End: 2021-09-24

## 2021-09-14 ASSESSMENT — ENCOUNTER SYMPTOMS
CONSTIPATION: 0
COUGH: 0
ABDOMINAL PAIN: 0
DIARRHEA: 0
SHORTNESS OF BREATH: 0
SORE THROAT: 0
CHEST TIGHTNESS: 0
WHEEZING: 0
NAUSEA: 0
EYE DISCHARGE: 0
VOMITING: 0

## 2021-09-14 NOTE — TELEPHONE ENCOUNTER
[facility] ([facility]) ED Follow up Call    Reason for ED visit:  Allergic reaction     9/14/2021     Silverio Nunez , this is Sumi  from Dr. Blaine Blackburn office, just calling to see how you are doing after your recent ED visit. Did you receive discharge instructions? Yes  Do you understand the discharge instructions? Yes  Did the ED give you any new prescriptions? Yes  Were you able to fill your prescriptions? Yes      Do you have one of our red, yellow and green  Zone sheets that help you to determine when you should go to the ED? Not Applicable      Do you need or want to make a follow up appt with your PCP? Yes    Do you have any further needs in the home, e.g. equipment?   Not Applicable        FU appts/Provider:    Future Appointments   Date Time Provider Ariella Elaine   9/15/2021 11:00 AM DO Denise Ayers [facility]   11/1/2021  4:00 PM Joy Godfrey MD [facility]   11/11/2021  1:30 PM Joy Godfrey MD [facility]

## 2021-09-14 NOTE — PROGRESS NOTES
2021    TELEHEALTH EVALUATION -- Audio/Visual (During [id number] public health emergency)    HPI:    Rupinder Drake (:  1985) has requested an audio/video evaluation for the following concern(s):    She is following up on her recent ER visit for insect bite on her left arm. She says that she was at work yesterday when she saw some blood dripping from her left arm. She found an insect bite larissa on close inspection. She says that initially there was a small swelling in the area but then it started growing bigger and now extends throughout her entire forearm. She has pain and itching in the area. She went to ER and they did ultrasound which was negative for any blood clot. She was prescribed Keflex and Benadryl. She says that she started Keflex but she has a history of previous allergic reaction to it and started having some nausea and vomiting. She did not take Benadryl because she was told by pharmacist that she is already on Claritin and should not take both. She has previously tolerated doxycycline well and is agreeable to trying that. She denies any fevers or chills but says that last night she had profuse sweating before she had episodes of vomiting. Denies any other issues. Review of Systems   Constitutional: Negative for appetite change, fatigue and fever. HENT: Negative for congestion, ear pain, hearing loss and sore throat. Eyes: Negative for discharge and visual disturbance. Respiratory: Negative for cough, chest tightness, shortness of breath and wheezing. Cardiovascular: Negative for chest pain, palpitations and leg swelling. Gastrointestinal: Negative for abdominal pain, constipation, diarrhea, nausea and vomiting. Genitourinary: Negative for flank pain, frequency, hematuria and urgency. Musculoskeletal: Negative for arthralgias, gait problem, joint swelling and myalgias. Skin: Positive for rash.    Neurological: Negative for dizziness, weakness, numbness and headaches. Psychiatric/Behavioral: Negative. Negative for dysphoric mood. The patient is not nervous/anxious. Prior to Visit Medications    Medication Sig Taking? Authorizing Provider   doxycycline hyclate (VIBRA-TABS) 100 MG tablet Take 1 tablet by mouth 2 times daily for 7 days Yes Pa Bridges MD   hydrOXYzine (ATARAX) 25 MG tablet Take 1 tablet by mouth every 8 hours as needed for Itching Yes Pa Bridges MD   psyllium (METAMUCIL) 28 % packet Take 1 packet by mouth daily Take with full glass of h20 or juice Yes Jessica Juarez MD   omeprazole (PRILOSEC) 20 MG delayed release capsule Take 1 capsule by mouth daily Yes Jessica Juarez MD   topiramate (TOPAMAX) 25 MG tablet 50mg AM, 25mg PM Yes Satya Juarez MD   QUEtiapine (SEROQUEL) 25 MG tablet 12.5mg to 25mg QHS PRN Yes Satya Juarez MD   cetirizine (ZYRTEC) 10 MG tablet  Yes Historical Provider, MD   EPINEPHrine (EPIPEN 2-KALI) 0.3 MG/0.3ML SOAJ injection Inject 0.3 mg into the muscle as needed Use as directed for allergic reaction Yes Historical Provider, MD       Social History     Tobacco Use    Smoking status: Current Every Day Smoker     Packs/day: 0.50     Years: 17.00     Pack years: 8.50     Types: Cigarettes     Start date: 6/17/2004    Smokeless tobacco: Never Used    Tobacco comment: is down to a half pack per day   Vaping Use    Vaping Use: Never used   Substance Use Topics    Alcohol use:  Yes     Alcohol/week: 4.0 standard drinks     Types: 4 Cans of beer per week    Drug use: Yes     Frequency: 7.0 times per week     Types: Marijuana     Comment: Patient states smoking to help her eat        Allergies   Allergen Reactions    Latex Other (See Comments)     Redness and swelling    Banana Anaphylaxis     Lips swell    Banana Extract Allergy Skin Test Anaphylaxis    [place] (Diagnostic)     Avocado     Buckwheat     Coconut Fatty Acids     Coconut Oil     Kiwi Extract     [place] Flavor [Flavoring Agent]     Other      Green peas, lima beans     Papaya Derivatives     Peanut (Diagnostic)     Tape Jenna [place] Tape] Other (See Comments)     Plastic tape causes skin irritation    Wheat Bran     Maxalt [Rizatriptan Benzoate] Nausea And Vomiting   ,   Past Medical History:   Diagnosis Date    Abdominal pain 3/27/2015    Acid reflux     Allergic reaction to allergy skin test 09/2020    legumes, wheat, peanuts almonds    Anxiety     Fibromyalgia     Headache     migraines    HPV (human papilloma virus) anogenital infection     Insomnia     Lumbar radicular pain 9/7/2018    Movement disorder     spasms, B&B    Mycoplasma infection 9/4/2015    Neuropathy     left hands, feet    Numbness and tingling 12/19/2017    Post partum depression 3/27/2015    Rheumatic fever     Spinal stenosis     Wears glasses    ,   Past Surgical History:   Procedure Laterality Date    CHOLECYSTECTOMY      COLONOSCOPY  08/31/2021    COLONOSCOPY N/A 8/31/2021    COLONOSCOPY WITH BIOPSY performed by Joy Godfrey MD at [facility] Dr[facility] Haque Record in [address] [place] [address]      removed some teeth at age 1   Blase Liming INTRAUTERINE DEVICE INSERTION  2017    TONSILLECTOMY AND ADENOIDECTOMY Bilateral 01/07/2016    UPPER GASTROINTESTINAL ENDOSCOPY  08/31/2021      EGD BIOPSY    UPPER GASTROINTESTINAL ENDOSCOPY N/A 8/31/2021    EGD BIOPSY performed by Joy Godfrey MD at [address] [place] [address]   ,   Social History     Tobacco Use    Smoking status: Current Every Day Smoker     Packs/day: 0.50     Years: 17.00     Pack years: 8.50     Types: Cigarettes     Start date: 6/17/2004    Smokeless tobacco: Never Used    Tobacco comment: is down to a half pack per day   Vaping Use    Vaping Use: Never used   Substance Use Topics    Alcohol use:  Yes     Alcohol/week: 4.0 standard drinks     Types: 4 Cans of beer per week    Drug use: Yes     Frequency: 7.0 times per week     Types: Marijuana     Comment: Patient states smoking to help her eat   ,   Family History   Problem Relation Age of Onset    Depression Mother     Diabetes Father     High Blood Pressure Father     High Cholesterol Father     Obesity Father     Other Father     Asthma Brother     Other Brother         Peanut allergies and multiple other allergies    Depression Brother     No Known Problems Maternal Grandmother     No Known Problems Maternal Grandfather     High Blood Pressure Paternal Grandmother     Breast Cancer Paternal Grandmother     High Cholesterol Paternal Grandmother     No Known Problems Paternal Grandfather     Colon Cancer Paternal Uncle     Uterine Cancer Neg Hx     Ovarian Cancer Neg Hx     Heart Disease Neg Hx     Stroke Neg Hx    ,   Immunization History   Administered Date(s) Administered    Hepatitis B 07/03/2014, 08/04/2014, 01/06/2015    Hepatitis B vaccine 07/03/2014, 08/04/2014, 01/06/2015    Influenza Vaccine, unspecified formulation 10/01/2014, 11/29/2016    Influenza Virus Vaccine 01/06/2015, 12/04/2018    Influenza, Quadv, IM, (6 mo and older Fluzone, Flulaval, Fluarix and 3 yrs and older Afluria) 12/04/2018    Influenza, Quadv, IM, PF (6 mo and older Fluzone, Flulaval, Fluarix, and 3 yrs and older Afluria) 01/06/2015, 11/29/2016    MMR 07/03/2014    Pneumococcal Polysaccharide ([id number]) 08/26/2015, 12/04/2018    Tdap (Boostrix, Adacel) 09/01/2013, 11/29/2016   ,   Health Maintenance   Topic Date Due    Hepatitis C screen  Never done    Flu vaccine (1) 09/01/2021    COVID-19 Vaccine (1) 01/26/2022 (Originally 5/6/1996)    Cervical cancer screen  09/01/2025    DTaP/Tdap/Td vaccine (3 - Td or Tdap) 11/29/2026    Pneumococcal 0-64 years Vaccine (2 of 2 - PPSV23) 05/06/2049    HIV screen  Completed    Hepatitis A vaccine  Aged Out    Hepatitis B vaccine  Aged Out    Hib vaccine  Aged Out    Meningococcal (ACWY) vaccine  Aged Out    Varicella vaccine  Discontinued       PHYSICAL EXAMINATION:  [ INSTRUCTIONS: \"[x]\" Indicates a positive item  \"[]\" Indicates a negative item  -- DELETE ALL ITEMS NOT EXAMINED]  Vital Signs: (As obtained by patient/caregiver or practitioner observation)    Blood pressure-  Heart rate-    Respiratory rate-    Temperature-  Pulse oximetry-     Constitutional: [x] Appears well-developed and well-nourished [x] No apparent distress      [] Abnormal-   Mental status  [x] Alert and awake  [x] Oriented to person/place/time [x]Able to follow commands      Eyes:  EOM    [x]  Normal  [] Abnormal-  Sclera  [x]  Normal  [] Abnormal -         Discharge [x]  None visible  [] Abnormal -    HENT:   [x] Normocephalic, atraumatic. [] Abnormal   [x] Mouth/Throat: Mucous membranes are moist.     External Ears [x] Normal  [] Abnormal-     Neck: [x] No visualized mass     Pulmonary/Chest: [x] Respiratory effort normal.  [x] No visualized signs of difficulty breathing or respiratory distress        [] Abnormal-      Musculoskeletal:   [] Normal gait with no signs of ataxia         [x] Normal range of motion of neck        [] Abnormal-       Neurological:        [x] No Facial Asymmetry (Cranial nerve 7 motor function) (limited exam to video visit)          [x] No gaze palsy        [] Abnormal-         Skin:        [] No significant exanthematous lesions or discoloration noted on facial skin         [x] Abnormal-erythematous swelling on left forearm extending from wrist to assisted up the forearm           Psychiatric:       [x] Normal Affect [x] No Hallucinations        [] Abnormal-     Other pertinent observable physical exam findings-     ASSESSMENT/PLAN:  1. Cellulitis of left upper extremity    - doxycycline hyclate (VIBRA-TABS) 100 MG tablet; Take 1 tablet by mouth 2 times daily for 7 days  Dispense: 14 tablet; Refill: 0  - hydrOXYzine (ATARAX) 25 MG tablet; Take 1 tablet by mouth every 8 hours as needed for Itching  Dispense: 30 tablet;  Refill: 0    Could not tolerate Keflex due to side effects of nausea and vomiting. Started on doxycycline as she has tolerated this well previously. Prescribed hydroxyzine as needed for itching. Advised to call back or go to ER if symptoms do not improve or continue to worsen. No follow-ups on file. Nga Haque, was evaluated through a synchronous (real-time) audio-video encounter. The patient (or guardian if applicable) is aware that this is a billable service. Verbal consent to proceed has been obtained within the past 12 months. The visit was conducted pursuant to the emergency declaration under the [address] [place] [address], [address] [place] [address] authority and the [place] [employer] [place] [employer] Act. Patient identification was verified, and a caregiver was present when appropriate. The patient was located in a state where the provider was credentialed to provide care. Total time spent on this encounter: Not billed by time    --Jackie Duong MD on 9/14/2021 at 11:22 AM    An electronic signature was used to authenticate this note.

## 2021-09-15 ENCOUNTER — HOSPITAL ENCOUNTER (OUTPATIENT)
Age: 36
Setting detail: SPECIMEN
Discharge: HOME | End: 2021-09-15
Payer: COMMERCIAL

## 2021-09-15 ENCOUNTER — OFFICE VISIT (OUTPATIENT)
Dept: OBGYN CLINIC | Age: 36
End: 2021-09-15
Payer: COMMERCIAL

## 2021-09-15 VITALS
SYSTOLIC BLOOD PRESSURE: 100 MMHG | HEIGHT: 66 IN | BODY MASS INDEX: 21.05 KG/M2 | WEIGHT: 131 LBS | DIASTOLIC BLOOD PRESSURE: 61 MMHG

## 2021-09-15 DIAGNOSIS — Z00.00 ANNUAL PHYSICAL EXAM: Primary | ICD-10-CM

## 2021-09-15 PROCEDURE — 99395 PREV VISIT EST AGE 18-39: CPT | Performed by: OBSTETRICS & GYNECOLOGY

## 2021-09-15 NOTE — PROGRESS NOTES
History and Physical      [place] office      Merline   9/15/2021              39 y.o. Chief Complaint   Patient presents with    Gynecologic Exam     annual     Abdominal Pain       No LMP recorded. Patient has had an implant. Primary Care Physician: Nely Weinstein MD        HPI : Merline  is a 39 y.o. female [id number]    No menses with IUD in place  Had Upper and Lower GI last week for chronic abdominal pain associated with eating food and GI doctor told pt nothing acutely in gut visualized, so patient is going to continue working on food allergy diet changes to help with her ongoing abdominal pain issues.      ________________________________________________________________________  OB History    Para Term  AB Living   2 2 2 0 0 2   SAB TAB Ectopic Molar Multiple Live Births   0 0 0 0 0 0      # Outcome Date GA Lbr Andrew/2nd Weight Sex Delivery Anes PTL Lv   2 Term      Vag-Spont      1 Term      Vag-Spont        Past Medical History:   Diagnosis Date    Abdominal pain 3/27/2015    Acid reflux     Allergic reaction to allergy skin test 2020    legumes, wheat, peanuts almonds    Anxiety     Fibromyalgia     Headache     migraines    HPV (human papilloma virus) anogenital infection     Insomnia     Lumbar radicular pain 2018    Movement disorder     spasms, B&B    Mycoplasma infection 2015    Neuropathy     left hands, feet    Numbness and tingling 2017    Post partum depression 3/27/2015    Rheumatic fever     Spinal stenosis     Wears glasses                                                                    Past Surgical History:   Procedure Laterality Date    CHOLECYSTECTOMY      COLONOSCOPY  2021    COLONOSCOPY N/A 2021    COLONOSCOPY WITH BIOPSY performed by Jessica Juarez MD at [facility] Dr. Anthony Hutchins in [address]      removed some teeth at age 1   [facility] INTRAUTERINE DEVICE INSERTION  2017    TONSILLECTOMY AND ADENOIDECTOMY Bilateral 01/07/2016    UPPER GASTROINTESTINAL ENDOSCOPY  08/31/2021      EGD BIOPSY    UPPER GASTROINTESTINAL ENDOSCOPY N/A 8/31/2021    EGD BIOPSY performed by Kat Sinclair MD at [address] [facility].     Family History   Problem Relation Age of Onset    Depression Mother     Diabetes Father     High Blood Pressure Father     High Cholesterol Father     Obesity Father     Other Father     Asthma Brother     Other Brother         Peanut allergies and multiple other allergies    Depression Brother     No Known Problems Maternal Grandmother     No Known Problems Maternal Grandfather     High Blood Pressure Paternal Grandmother     Breast Cancer Paternal Grandmother     High Cholesterol Paternal Grandmother     No Known Problems Paternal Grandfather     Colon Cancer Paternal Uncle     Uterine Cancer Neg Hx     Ovarian Cancer Neg Hx     Heart Disease Neg Hx     Stroke Neg Hx      Social History     Socioeconomic History    Marital status: Single     Spouse name: Not on file    Number of children: 2    Years of education: Not on file    Highest education level: Not on file   Occupational History    Occupation: student     Employer: NONE   Tobacco Use    Smoking status: Current Every Day Smoker     Packs/day: 0.50     Years: 17.00     Pack years: 8.50     Types: Cigarettes     Start date: 6/17/2004    Smokeless tobacco: Never Used    Tobacco comment: is down to a half pack per day   Vaping Use    Vaping Use: Never used   Substance and Sexual Activity    Alcohol use: Yes     Alcohol/week: 4.0 standard drinks     Types: 4 Cans of beer per week    Drug use: Yes     Frequency: 7.0 times per week     Types: Marijuana     Comment: Patient states smoking to help her eat    Sexual activity: Yes     Partners: Male     Birth control/protection: I.U.D.      Comment: has been with current partner since 2018   Other Topics Concern  Not on file   Social History Narrative    Not on file     Social Determinants of Health     Financial Resource Strain: Low Risk     Difficulty of Paying Living Expenses: Not hard at all   Food Insecurity: No Food Insecurity    Worried About Running Out of Food in the Last Year: Never true    [address] in the Last Year: Never true   Transportation Needs:     Lack of Transportation (Medical):  Lack of Transportation (Non-Medical):    Physical Activity:     Days of Exercise per Week:     Minutes of Exercise per Session:    Stress:     Feeling of Stress :    Social Connections:     Frequency of Communication with Friends and Family:     Frequency of Social Gatherings with Friends and Family:     Attends Worship Services:     Active Member of Clubs or Organizations:     Attends Club or Organization Meetings:     Marital Status:    Intimate Partner Violence:     Fear of Current or Ex-Partner:     Emotionally Abused:     Physically Abused:     Sexually Abused:        MEDICATIONS:  Current Outpatient Medications   Medication Sig Dispense Refill    doxycycline hyclate (VIBRA-TABS) 100 MG tablet Take 1 tablet by mouth 2 times daily for 7 days 14 tablet 0    omeprazole (PRILOSEC) 20 MG delayed release capsule Take 1 capsule by mouth daily 30 capsule 3    topiramate (TOPAMAX) 25 MG tablet 50mg AM, 25mg  tablet 1    QUEtiapine (SEROQUEL) 25 MG tablet 12.5mg to 25mg QHS PRN 30 tablet 3    cetirizine (ZYRTEC) 10 MG tablet       EPINEPHrine (EPIPEN 2-KALI) 0.3 MG/0.3ML SOAJ injection Inject 0.3 mg into the muscle as needed Use as directed for allergic reaction      hydrOXYzine (ATARAX) 25 MG tablet Take 1 tablet by mouth every 8 hours as needed for Itching (Patient not taking: Reported on 9/15/2021) 30 tablet 0     No current facility-administered medications for this visit.            ALLERGIES:  Allergies as of 09/15/2021 - Fully Reviewed 09/15/2021   Allergen Reaction Noted    Latex Other (See Comments) 12/19/2014    Banana Anaphylaxis 09/20/2019    Banana extract allergy skin test Anaphylaxis 09/20/2019    [place] (diagnostic)  10/02/2020    Avocado  04/15/2018    Buckwheat  10/02/2020    Coconut fatty acids  09/20/2019    Coconut oil  04/15/2018    Kiwi extract  04/15/2018    [place] flavor [flavoring agent]  09/20/2019    Other  10/02/2020    Papaya derivatives  09/20/2019    Peanut (diagnostic)  10/02/2020    Tape [place] tape] Other (See Comments) 01/07/2016    Wheat bran  10/02/2020    Maxalt [rizatriptan benzoate] Nausea And Vomiting 11/08/2019       Health Maintenance:  Health Maintenance Due   Topic Date Due    Hepatitis C screen  Never done    Flu vaccine (1) 09/01/2021     Review Of Systems (11 point):  Constitutional: No fever, chills or malaise; No weight change or fatigue  Head and Eyes: No vision, Headache, Dizziness or trauma in last 12 months  ENT ROS: No hearing, Tinnitis, sinus or taste problems  Hematological and Lymphatic ROS:No Lymphoma, Von Willebrand's, Hemophillia or Bleeding History  Psych ROS: No Depression, Homicidal thoughts,suicidal thoughts, or anxiety  Breast ROS: No prior breast abnormalities or lumps  Respiratory ROS: No SOB, Pneumoniae,Cough, or Pulmonary Embolism History  Cardiovascular ROS: No Chest Pain with Exertion, Palpitations, Syncope, Edema, Arrhythmia  Gastrointestinal ROS: No Indigestion, Heartburn, Nausea, vomiting, Diarrhea, Constipation,or Bowel Changes; No Bloody Stools or melena  Genito-Urinary ROS: No Dysuria, Hematuria or Nocturia.  No Urinary Incontinence or Vaginal Discharge  Musculoskeletal ROS: No Arthralgia, Arthritis,Gout,Osteoporosis or Rheumatism  Neurological ROS: No CVA, Migraines, Epilepsy, Seizure Hx, or Limb Weakness  Dermatological ROS: No Rash, Itching, Hives, Mole Changes or Cancer PHYSICAL Exam:     Constitutional:  Vitals:    09/15/21 1107   BP: 100/61   Site: Left Upper Arm   Position: Sitting   Cuff Size: Medium Adult   Weight: 131 lb (59.4 kg)   Height: 5' 6\" (1.676 m)         General Appearance: This  is a well Developed, well Nourished, well groomed female. Skin:  There was a Normal Inspection of the skin without rashes or lesions. Extremities: The patients extremities were without calf tenderness, edema, or varicosities. Abdomen: The abdomen was soft and non-tender. There were good bowel sounds in all quadrants and there was no guarding, rebound or rigidity. Psych: The patient had a normal Orientation to: Time, Place, Person, and Situation  Breast:  (Chest)  normal appearance, no masses or tenderness  Pelvic Exam:  Vulva and vagina appear normal. Bimanual exam reveals normal uterus and adnexa. Musculosk:  Normal Gait and station was noted. ASSESSMENT:      39 y.o. Annual   Diagnosis Orders   1.  Annual physical exam  PAP Smear          Chief Complaint   Patient presents with    Gynecologic Exam     annual     Abdominal Pain          Past Medical History:   Diagnosis Date    Abdominal pain 3/27/2015    Acid reflux     Allergic reaction to allergy skin test 09/2020    legumes, wheat, peanuts almonds    Anxiety     Fibromyalgia     Headache     migraines    HPV (human papilloma virus) anogenital infection     Insomnia     Lumbar radicular pain 9/7/2018    Movement disorder     spasms, B&B    Mycoplasma infection 9/4/2015    Neuropathy     left hands, feet    Numbness and tingling 12/19/2017    Post partum depression 3/27/2015    Rheumatic fever     Spinal stenosis     Wears glasses          Patient Active Problem List    Diagnosis Date Noted    Chronic diarrhea     Weight loss due to medication 04/24/2020    Tobacco abuse 12/04/2018    Fibromyalgia Question:   Collection Type     Answer: Thin Prep     Order Specific Question:   Prior Abnormal Pap Test     Answer:   No     Order Specific Question:   Screening or Diagnostic     Answer:   Screening     Order Specific Question:   HPV Requested?      Answer:   Yes     Order Specific Question:   High Risk Patient     Answer:   N/A

## 2021-09-17 LAB
HPV SAMPLE: NORMAL
HPV, GENOTYPE 16: NOT DETECTED
HPV, GENOTYPE 18: NOT DETECTED
HPV, HIGH RISK OTHER: NOT DETECTED
HPV, INTERPRETATION: NORMAL
SPECIMEN DESCRIPTION: NORMAL

## 2021-09-21 LAB — CYTOLOGY REPORT: NORMAL

## 2022-02-22 RX ORDER — OMEPRAZOLE 20 MG/1
CAPSULE, DELAYED RELEASE ORAL
Qty: 30 CAPSULE | Refills: 3 | Status: SHIPPED | OUTPATIENT
Start: 2022-02-22

## 2022-03-01 DIAGNOSIS — R51.9 HEADACHE DISORDER: ICD-10-CM

## 2022-03-01 RX ORDER — TOPIRAMATE 25 MG/1
TABLET ORAL
Qty: 270 TABLET | Refills: 1 | OUTPATIENT
Start: 2022-03-01

## 2022-06-15 ENCOUNTER — PATIENT MESSAGE (OUTPATIENT)
Dept: FAMILY MEDICINE CLINIC | Age: 37
End: 2022-06-15

## 2022-06-16 NOTE — TELEPHONE ENCOUNTER
From: Karolina Khalil  To: Dr. Xu Madrid: 6/15/2022 8:46 PM EDT  Subject: Neck and Shoulder pain    I have been experiencing Pain in my shoulders and neck the last week. It has progressively gotten worse. Today, it was difficult to turn my head and I am having a lot of pressure and pulling just holding my head up. I am hoping to get an appointment with someone on the next day or so to try and alleviate and figure out what is going on. Thank you.   Kelly Glass

## 2022-06-17 ENCOUNTER — OFFICE VISIT (OUTPATIENT)
Dept: FAMILY MEDICINE CLINIC | Age: 37
End: 2022-06-17
Payer: COMMERCIAL

## 2022-06-17 VITALS
DIASTOLIC BLOOD PRESSURE: 62 MMHG | SYSTOLIC BLOOD PRESSURE: 111 MMHG | WEIGHT: 131 LBS | BODY MASS INDEX: 21.05 KG/M2 | HEIGHT: 66 IN | TEMPERATURE: 97.1 F | HEART RATE: 80 BPM

## 2022-06-17 DIAGNOSIS — M62.830 SPASM OF THORACIC BACK MUSCLE: Primary | ICD-10-CM

## 2022-06-17 DIAGNOSIS — Z76.89 ESTABLISHING CARE WITH NEW DOCTOR, ENCOUNTER FOR: ICD-10-CM

## 2022-06-17 PROCEDURE — 4004F PT TOBACCO SCREEN RCVD TLK: CPT

## 2022-06-17 PROCEDURE — 99213 OFFICE O/P EST LOW 20 MIN: CPT

## 2022-06-17 PROCEDURE — G8420 CALC BMI NORM PARAMETERS: HCPCS

## 2022-06-17 PROCEDURE — G8427 DOCREV CUR MEDS BY ELIG CLIN: HCPCS

## 2022-06-17 RX ORDER — CYCLOBENZAPRINE HCL 10 MG
10 TABLET ORAL 3 TIMES DAILY PRN
Qty: 21 TABLET | Refills: 0 | Status: SHIPPED | OUTPATIENT
Start: 2022-06-17 | End: 2022-06-27

## 2022-06-17 ASSESSMENT — PATIENT HEALTH QUESTIONNAIRE - PHQ9
7. TROUBLE CONCENTRATING ON THINGS, SUCH AS READING THE NEWSPAPER OR WATCHING TELEVISION: 3
SUM OF ALL RESPONSES TO PHQ QUESTIONS 1-9: 10
8. MOVING OR SPEAKING SO SLOWLY THAT OTHER PEOPLE COULD HAVE NOTICED. OR THE OPPOSITE, BEING SO FIGETY OR RESTLESS THAT YOU HAVE BEEN MOVING AROUND A LOT MORE THAN USUAL: 1
9. THOUGHTS THAT YOU WOULD BE BETTER OFF DEAD, OR OF HURTING YOURSELF: 0
3. TROUBLE FALLING OR STAYING ASLEEP: 0
1. LITTLE INTEREST OR PLEASURE IN DOING THINGS: 1
SUM OF ALL RESPONSES TO PHQ QUESTIONS 1-9: 10
4. FEELING TIRED OR HAVING LITTLE ENERGY: 2
5. POOR APPETITE OR OVEREATING: 2
2. FEELING DOWN, DEPRESSED OR HOPELESS: 1
10. IF YOU CHECKED OFF ANY PROBLEMS, HOW DIFFICULT HAVE THESE PROBLEMS MADE IT FOR YOU TO DO YOUR WORK, TAKE CARE OF THINGS AT HOME, OR GET ALONG WITH OTHER PEOPLE: 1
6. FEELING BAD ABOUT YOURSELF - OR THAT YOU ARE A FAILURE OR HAVE LET YOURSELF OR YOUR FAMILY DOWN: 0
SUM OF ALL RESPONSES TO PHQ9 QUESTIONS 1 & 2: 2

## 2022-06-17 ASSESSMENT — ENCOUNTER SYMPTOMS
COUGH: 0
SORE THROAT: 0
EYE REDNESS: 0
EYE DISCHARGE: 0
SHORTNESS OF BREATH: 0
SINUS PAIN: 0
VOMITING: 0
EYE ITCHING: 0
CHEST TIGHTNESS: 0
ABDOMINAL PAIN: 0
WHEEZING: 0
NAUSEA: 0
TROUBLE SWALLOWING: 0
DIARRHEA: 0
SINUS PRESSURE: 0

## 2022-06-17 NOTE — PROGRESS NOTES
[address] [facility],[address]  [address] [facility]  [address]  6/17/2022    Melia Layton is a 44 y.o. female who presents today for her medical conditions and/or complaints as noted below. Melia Layton is scheduled today for Shoulder Pain (shoulder blade area and neck x 4-5 days)  . HPI:     This is a 26-year-old female presenting to the office for back pain. Approximately 4 days ago the patient began to have bilateral back pain located along the top of her shoulder blades and it is radiating into her neck. She denies any injury or trauma. She states this is never happened to her previously. She denies any excessive lifting twisting or other unusual body movements that may have spurred this on. Previous notes reviewed the patient's chart.     Vitals:    06/17/22 0928   BP: 111/62   Site: Right Upper Arm   Position: Sitting   Cuff Size: Medium Adult   Pulse: 80   Temp: 97.1 °F (36.2 °C)   TempSrc: Temporal   Weight: 131 lb (59.4 kg)   Height: 5' 6\" (1.676 m)      Past Medical History:   Diagnosis Date    Abdominal pain 3/27/2015    Acid reflux     Allergic reaction to allergy skin test 09/2020    legumes, wheat, peanuts almonds    Anxiety     Fibromyalgia     Headache     migraines    HPV (human papilloma virus) anogenital infection     Insomnia     Lumbar radicular pain 9/7/2018    Movement disorder     spasms, B&B    Mycoplasma infection 9/4/2015    Neuropathy     left hands, feet    Numbness and tingling 12/19/2017    Post partum depression 3/27/2015    Rheumatic fever     Spinal stenosis     Wears glasses       Past Surgical History:   Procedure Laterality Date    CHOLECYSTECTOMY      COLONOSCOPY  08/31/2021    COLONOSCOPY N/A 8/31/2021    COLONOSCOPY WITH BIOPSY performed by Renate Campa MD at [facility] Dr. Willian Shepherd in [address]      removed some teeth at age 1   [facility]  2017   [address] AND ADENOIDECTOMY Bilateral 01/07/2016    UPPER GASTROINTESTINAL ENDOSCOPY  08/31/2021      EGD BIOPSY    UPPER GASTROINTESTINAL ENDOSCOPY N/A 8/31/2021    EGD BIOPSY performed by Wilbert Mason MD at [address] [facility].     Family History   Problem Relation Age of Onset    Depression Mother     Diabetes Father     High Blood Pressure Father     High Cholesterol Father     Obesity Father     Other Father     Asthma Brother     Other Brother         Peanut allergies and multiple other allergies    Depression Brother     No Known Problems Maternal Grandmother     No Known Problems Maternal Grandfather     High Blood Pressure Paternal Grandmother     Breast Cancer Paternal Grandmother     High Cholesterol Paternal Grandmother     No Known Problems Paternal Grandfather     Colon Cancer Paternal Uncle     Uterine Cancer Neg Hx     Ovarian Cancer Neg Hx     Heart Disease Neg Hx     Stroke Neg Hx      Social History     Tobacco Use    Smoking status: Current Every Day Smoker     Packs/day: 0.50     Years: 17.00     Pack years: 8.50     Types: Cigarettes     Start date: 6/17/2004    Smokeless tobacco: Never Used    Tobacco comment: is down to a half pack per day   Substance Use Topics    Alcohol use:  Yes     Alcohol/week: 4.0 standard drinks     Types: 4 Cans of beer per week      Current Outpatient Medications   Medication Sig Dispense Refill    cyclobenzaprine (FLEXERIL) 10 MG tablet Take 1 tablet by mouth 3 times daily as needed for Muscle spasms 21 tablet 0    omeprazole (PRILOSEC) 20 MG delayed release capsule TAKE ONE CAPSULE BY MOUTH DAILY 30 capsule 3    topiramate (TOPAMAX) 25 MG tablet 50mg AM, 25mg  tablet 1    QUEtiapine (SEROQUEL) 25 MG tablet 12.5mg to 25mg QHS PRN 30 tablet 3    cetirizine (ZYRTEC) 10 MG tablet       EPINEPHrine (EPIPEN 2-KALI) 0.3 MG/0.3ML SOAJ injection Inject 0.3 mg into the muscle as needed Use as directed for allergic reaction       No current facility-administered medications for this visit. Allergies   Allergen Reactions    Latex Other (See Comments)     Redness and swelling    Banana Anaphylaxis     Lips swell    Banana Extract Allergy Skin Test Anaphylaxis    [place] (Diagnostic)     Avocado     Buckwheat     Coconut Fatty Acids     Coconut Oil     Kiwi Extract     Garret Flavor [Flavoring Agent]     Other      Green peas, lima beans     Papaya Derivatives     Peanut (Diagnostic)     Tape [place] Tape] Other (See Comments)     Plastic tape causes skin irritation    Wheat Bran     Maxalt [Rizatriptan Benzoate] Nausea And Vomiting       Health Maintenance   Topic Date Due    COVID-19 Vaccine (1) Never done    Depression Monitoring  Never done    Hepatitis C screen  Never done    Pneumococcal 0-64 years Vaccine (2 - PCV) 12/04/2019    Flu vaccine (Season Ended) 09/01/2022    Cervical cancer screen  09/15/2026    DTaP/Tdap/Td vaccine (3 - Td or Tdap) 11/29/2026    HIV screen  Completed    Hepatitis A vaccine  Aged Out    Hepatitis B vaccine  Aged Out    Hib vaccine  Aged Out    Meningococcal (ACWY) vaccine  Aged Out    Varicella vaccine  Discontinued       Subjective:      Review of Systems   Constitutional: Negative for chills, fatigue and fever. HENT: Negative for ear discharge, ear pain, sinus pressure, sinus pain, sore throat and trouble swallowing. Eyes: Negative for discharge, redness and itching. Respiratory: Negative for cough, chest tightness, shortness of breath and wheezing. Cardiovascular: Negative for chest pain. Gastrointestinal: Negative for abdominal pain, diarrhea, nausea and vomiting. Genitourinary: Negative for difficulty urinating. Musculoskeletal: Positive for myalgias and neck stiffness. Negative for arthralgias and neck pain. Skin: Negative for rash. Neurological: Negative for dizziness, weakness, light-headedness and headaches. All other systems reviewed and are negative. Objective:     Physical Exam  Vitals reviewed. Constitutional:       General: She is not in acute distress. Appearance: Normal appearance. She is normal weight. She is not ill-appearing. HENT:      Head: Normocephalic and atraumatic. Nose: Nose normal.   Eyes:      Extraocular Movements: Extraocular movements intact. Conjunctiva/sclera: Conjunctivae normal.      Pupils: Pupils are equal, round, and reactive to light. Cardiovascular:      Rate and Rhythm: Normal rate and regular rhythm. Pulses: Normal pulses. Heart sounds: Normal heart sounds. No murmur heard. Pulmonary:      Effort: Pulmonary effort is normal. No respiratory distress. Breath sounds: Normal breath sounds. No wheezing, rhonchi or rales. Abdominal:      General: Abdomen is flat. Palpations: Abdomen is soft. Musculoskeletal:         General: Normal range of motion. Cervical back: Neck supple. Tenderness present. Thoracic back: Spasms present. Back:    Skin:     General: Skin is warm and dry. Capillary Refill: Capillary refill takes less than 2 seconds. Neurological:      General: No focal deficit present. Mental Status: She is alert and oriented to person, place, and time. Psychiatric:         Mood and Affect: Mood normal.          Assessment/Plan:      1. Spasm of thoracic back muscle  -     cyclobenzaprine (FLEXERIL) 10 MG tablet; Take 1 tablet by mouth 3 times daily as needed for Muscle spasms, Disp-21 tablet, R-0Normal  2. Establishing care with new doctor, encounter for     Would like the patient to begin taking Flexeril 10 mg 3 times daily as needed for the muscle spasms. Also encouraged the patient to use over-the-counter muscle rubs, as well as consider investing in a TENS unit for musculoskeletal pain. Return if symptoms worsen or fail to improve. No orders of the defined types were placed in this encounter.     Orders Placed This Encounter   Medications  cyclobenzaprine (FLEXERIL) 10 MG tablet     Sig: Take 1 tablet by mouth 3 times daily as needed for Muscle spasms     Dispense:  21 tablet     Refill:  0       Reviewed health maintenance, prior labs and imaging. Patient given educational materials - see patient instructions. Discussed use, benefit, and side effects of prescribed medications. Barriers to medication compliance addressed. All patient questions answered. Pt voiced understanding to plan of care. Instructed to continue medications as discussed, healthy diet and exercise. Patient agreed with treatment plan. Follow up as directed below. This note is created with the assistance of a speech-recognition program. While intending to generate a document that actually reflects the content of the visit, no guarantees can be provided that every mistake has been identified and corrected by editing.     Electronically signed by SATHISH Noel CNP, APRN-CNP on 6/17/2022 at 9:56 AM

## 2022-09-02 ENCOUNTER — HOSPITAL ENCOUNTER (OUTPATIENT)
Age: 37
Setting detail: SPECIMEN
Discharge: HOME | End: 2022-09-02

## 2022-09-02 ENCOUNTER — PROCEDURE VISIT (OUTPATIENT)
Dept: OBGYN CLINIC | Age: 37
End: 2022-09-02
Payer: COMMERCIAL

## 2022-09-02 VITALS
DIASTOLIC BLOOD PRESSURE: 62 MMHG | WEIGHT: 132 LBS | SYSTOLIC BLOOD PRESSURE: 110 MMHG | HEIGHT: 66 IN | BODY MASS INDEX: 21.21 KG/M2

## 2022-09-02 DIAGNOSIS — Z30.433 ENCOUNTER FOR IUD REMOVAL AND REINSERTION: Primary | ICD-10-CM

## 2022-09-02 DIAGNOSIS — Z53.8 UNSUCCESSFUL ATTEMPT TO REMOVE INTRAUTERINE DEVICE (IUD): ICD-10-CM

## 2022-09-02 DIAGNOSIS — Z97.5 UNSUCCESSFUL ATTEMPT TO REMOVE INTRAUTERINE DEVICE (IUD): ICD-10-CM

## 2022-09-02 LAB
CONTROL: NORMAL
PREGNANCY TEST URINE, POC: NEGATIVE

## 2022-09-02 PROCEDURE — G8420 CALC BMI NORM PARAMETERS: HCPCS | Performed by: ADVANCED PRACTICE MIDWIFE

## 2022-09-02 PROCEDURE — 4004F PT TOBACCO SCREEN RCVD TLK: CPT | Performed by: ADVANCED PRACTICE MIDWIFE

## 2022-09-02 PROCEDURE — 81025 URINE PREGNANCY TEST: CPT | Performed by: ADVANCED PRACTICE MIDWIFE

## 2022-09-02 PROCEDURE — 99213 OFFICE O/P EST LOW 20 MIN: CPT | Performed by: ADVANCED PRACTICE MIDWIFE

## 2022-09-02 PROCEDURE — G8427 DOCREV CUR MEDS BY ELIG CLIN: HCPCS | Performed by: ADVANCED PRACTICE MIDWIFE

## 2022-09-02 ASSESSMENT — ENCOUNTER SYMPTOMS
VOMITING: 0
DIARRHEA: 0
NAUSEA: 0
SHORTNESS OF BREATH: 0
ABDOMINAL PAIN: 0

## 2022-09-02 NOTE — PROGRESS NOTES
[address] [facility],[address] OB[facility] [address]  [facility]  [address] [address]  Dept: [phone]    Patient Name: Atul Estimable  Patient Age: 44 y.o. Date of Visit: 2022    Subjective  Chief Complaint   Patient presents with    Procedure     No LMP recorded (lmp unknown). Patient has had an implant. HPI    Arrives for IUD removal. Reports has had placed for 5 years and was a 3 year IUD. Reports is sexually active with one male partner. Denies pain with sex. NO hx of STD.      PHQ Scores 2022 2018 3/20/2018 2017 2017   PHQ2 Score 2 0 2 0 0   PHQ9 Score 10 0 13 0 0     Interpretation of Total Score Depression Severity: 1-4 = Minimal depression, 5-9 = Mild depression, 10-14 = Moderate depression, 15-19 = Moderately severe depression, 20-27 = Severe depression      OB History          2    Para   2    Term   2            AB        Living   2         SAB        IAB        Ectopic        Molar        Multiple        Live Births                  Past Medical History:   Diagnosis Date    Abdominal pain 3/27/2015    Acid reflux     Allergic reaction to allergy skin test 2020    legumes, wheat, peanuts almonds    Anxiety     Fibromyalgia     Headache     migraines    HPV (human papilloma virus) anogenital infection     Insomnia     Lumbar radicular pain 2018    Movement disorder     spasms, B&B    Mycoplasma infection 2015    Neuropathy     left hands, feet    Numbness and tingling 2017    Post partum depression 3/27/2015    Rheumatic fever     Spinal stenosis     Wears glasses      Current Outpatient Medications   Medication Sig Dispense Refill    omeprazole (PRILOSEC) 20 MG delayed release capsule TAKE ONE CAPSULE BY MOUTH DAILY 30 capsule 3    cetirizine (ZYRTEC) 10 MG tablet       EPINEPHrine (EPIPEN) 0.3 MG/0.3ML SOAJ injection Inject 0.3 mg into the muscle as needed Use as directed for allergic reaction      topiramate (TOPAMAX) 25 MG tablet 50mg AM, 25mg PM (Patient not taking: Reported on 9/2/2022) 270 tablet 1    QUEtiapine (SEROQUEL) 25 MG tablet 12.5mg to 25mg QHS PRN (Patient not taking: Reported on 9/2/2022) 30 tablet 3     No current facility-administered medications for this visit. Review of Systems   Constitutional:  Negative for unexpected weight change. Respiratory:  Negative for shortness of breath. Cardiovascular:  Negative for chest pain, palpitations and leg swelling. Gastrointestinal:  Negative for abdominal pain, diarrhea, nausea and vomiting. Genitourinary:  Negative for difficulty urinating, dyspareunia, menstrual problem, vaginal discharge and vaginal pain. Neurological:  Negative for dizziness, light-headedness and headaches. Objective  /62 (Site: Right Upper Arm, Position: Sitting, Cuff Size: Medium Adult)   Ht 5' 6\" (1.676 m)   Wt 132 lb (59.9 kg)   LMP  (LMP Unknown)   BMI 21.31 kg/m²     Physical Exam    Patient placed in stirrups. Speculum inserted. Cervical os visualized. IUD strings noted from cervical os. Cultures obtained. With a ring forceps the IUD string were grasped with gentle traction IUD was not removed. Speculum re-adjusted and IUD strings again grasped and attempted to remove device unsuccessfully. Concern for IUD migration into uterine wall. Assessment & Plan  1. Encounter for IUD removal and reinsertion  - POCT urine pregnancy  - C.trachomatis N.gonorrhoeae DNA; Future  - US PELVIS COMPLETE NON-OB TRANSABDOMINAL AND TRANSVAGINAL; Future    2. Unsuccessful attempt to remove intrauterine device (IUD)  - POCT urine pregnancy  - C.trachomatis N.gonorrhoeae DNA; Future  - US PELVIS COMPLETE NON-OB TRANSABDOMINAL AND TRANSVAGINAL;  Future  - Strongly recommended using condoms during sexually activity         The patient, Chino Rehman , was seen with a total time spent of 25 minutes for the visit on this date of service by the QHCP  Both face-to-face (counseling and education) and non face-to-face time (care coordination), were spent in determining the total time component. Return for  for IUD removal consult unable to remove in office by SO.     Electronically Signed SATHISH العلي CNM

## 2022-09-03 DIAGNOSIS — Z53.8 UNSUCCESSFUL ATTEMPT TO REMOVE INTRAUTERINE DEVICE (IUD): ICD-10-CM

## 2022-09-03 DIAGNOSIS — Z30.433 ENCOUNTER FOR IUD REMOVAL AND REINSERTION: ICD-10-CM

## 2022-09-03 DIAGNOSIS — Z97.5 UNSUCCESSFUL ATTEMPT TO REMOVE INTRAUTERINE DEVICE (IUD): ICD-10-CM

## 2022-09-29 ENCOUNTER — PREP FOR PROCEDURE (OUTPATIENT)
Dept: OBGYN CLINIC | Age: 37
End: 2022-09-29

## 2022-10-03 ENCOUNTER — PREP FOR PROCEDURE (OUTPATIENT)
Dept: OBGYN CLINIC | Age: 37
End: 2022-10-03
Payer: COMMERCIAL

## 2022-10-03 DIAGNOSIS — Z01.818 PRE-OP TESTING: Primary | ICD-10-CM

## 2022-10-03 PROCEDURE — 36415 COLL VENOUS BLD VENIPUNCTURE: CPT | Performed by: OBSTETRICS & GYNECOLOGY

## 2022-10-07 ENCOUNTER — HOSPITAL ENCOUNTER (OUTPATIENT)
Dept: PREADMISSION TESTING | Age: 37
Discharge: HOME | End: 2022-10-11
Payer: COMMERCIAL

## 2022-10-07 VITALS
SYSTOLIC BLOOD PRESSURE: 110 MMHG | WEIGHT: 130 LBS | HEART RATE: 62 BPM | BODY MASS INDEX: 20.89 KG/M2 | HEIGHT: 66 IN | DIASTOLIC BLOOD PRESSURE: 57 MMHG | TEMPERATURE: 98.6 F | RESPIRATION RATE: 18 BRPM | OXYGEN SATURATION: 100 %

## 2022-10-07 LAB
ABSOLUTE EOS #: 0.1 K/UL (ref 0–0.4)
ABSOLUTE LYMPH #: 1.6 K/UL (ref 1–4.8)
ABSOLUTE MONO #: 0.4 K/UL (ref 0.1–1.3)
ALBUMIN SERPL-MCNC: 4.5 G/DL (ref 3.5–5.2)
ALP BLD-CCNC: 49 U/L (ref 35–104)
ALT SERPL-CCNC: 10 U/L (ref 5–33)
ANION GAP SERPL CALCULATED.3IONS-SCNC: 9 MMOL/L (ref 9–17)
AST SERPL-CCNC: 20 U/L
BASOPHILS # BLD: 1 % (ref 0–2)
BASOPHILS ABSOLUTE: 0 K/UL (ref 0–0.2)
BILIRUB SERPL-MCNC: 0.5 MG/DL (ref 0.3–1.2)
BILIRUBIN URINE: NEGATIVE
BUN BLDV-MCNC: 6 MG/DL (ref 6–20)
CALCIUM SERPL-MCNC: 9.2 MG/DL (ref 8.6–10.4)
CHLORIDE BLD-SCNC: 109 MMOL/L (ref 98–107)
CO2: 26 MMOL/L (ref 20–31)
COLOR: YELLOW
COMMENT UA: NORMAL
CREAT SERPL-MCNC: 0.63 MG/DL (ref 0.5–0.9)
EOSINOPHILS RELATIVE PERCENT: 2 % (ref 0–4)
ESTIMATED AVERAGE GLUCOSE: 103 MG/DL
GFR SERPL CREATININE-BSD FRML MDRD: >60 ML/MIN/1.73M2
GLUCOSE BLD-MCNC: 95 MG/DL (ref 70–99)
GLUCOSE URINE: NEGATIVE
HBA1C MFR BLD: 5.2 % (ref 4–6)
HCG QUANTITATIVE: <1 MIU/ML
HCT VFR BLD CALC: 39 % (ref 36–46)
HEMOGLOBIN: 12.9 G/DL (ref 12–16)
INR BLD: 1.1
KETONES, URINE: NEGATIVE
LEUKOCYTE ESTERASE, URINE: NEGATIVE
LYMPHOCYTES # BLD: 28 % (ref 24–44)
MCH RBC QN AUTO: 28.9 PG (ref 26–34)
MCHC RBC AUTO-ENTMCNC: 33.1 G/DL (ref 31–37)
MCV RBC AUTO: 87.5 FL (ref 80–100)
MONOCYTES # BLD: 6 % (ref 1–7)
NITRITE, URINE: NEGATIVE
PARTIAL THROMBOPLASTIN TIME: 32.8 SEC (ref 24–36)
PDW BLD-RTO: 13.5 % (ref 11.5–14.9)
PH UA: 7 (ref 5–8)
PLATELET # BLD: 192 K/UL (ref 150–450)
PMV BLD AUTO: 8.9 FL (ref 6–12)
POTASSIUM SERPL-SCNC: 4 MMOL/L (ref 3.7–5.3)
PROTEIN UA: NEGATIVE
PROTHROMBIN TIME: 13.8 SEC (ref 11.8–14.6)
RBC # BLD: 4.45 M/UL (ref 4–5.2)
SEG NEUTROPHILS: 63 % (ref 36–66)
SEGMENTED NEUTROPHILS ABSOLUTE COUNT: 3.6 K/UL (ref 1.3–9.1)
SODIUM BLD-SCNC: 144 MMOL/L (ref 135–144)
SPECIFIC GRAVITY UA: 1.01 (ref 1–1.03)
TOTAL PROTEIN: 6.7 G/DL (ref 6.4–8.3)
TSH SERPL DL<=0.05 MIU/L-ACNC: 0.95 UIU/ML (ref 0.3–5)
TURBIDITY: CLEAR
URINE HGB: NEGATIVE
UROBILINOGEN, URINE: NORMAL
WBC # BLD: 5.8 K/UL (ref 3.5–11)

## 2022-10-07 PROCEDURE — 36415 COLL VENOUS BLD VENIPUNCTURE: CPT

## 2022-10-07 PROCEDURE — 84702 CHORIONIC GONADOTROPIN TEST: CPT

## 2022-10-07 PROCEDURE — 81003 URINALYSIS AUTO W/O SCOPE: CPT

## 2022-10-07 PROCEDURE — 84443 ASSAY THYROID STIM HORMONE: CPT

## 2022-10-07 PROCEDURE — 85025 COMPLETE CBC W/AUTO DIFF WBC: CPT

## 2022-10-07 PROCEDURE — 80053 COMPREHEN METABOLIC PANEL: CPT

## 2022-10-07 PROCEDURE — 85730 THROMBOPLASTIN TIME PARTIAL: CPT

## 2022-10-07 PROCEDURE — 85610 PROTHROMBIN TIME: CPT

## 2022-10-07 PROCEDURE — 83036 HEMOGLOBIN GLYCOSYLATED A1C: CPT

## 2022-10-07 NOTE — DISCHARGE INSTRUCTIONS
officer. Your IV will be started and you will meet your anesthesiologist.    When you go to surgery, your family will be directed to the surgical waiting room, where the doctor should speak with them after your surgery. After surgery, you will be taken to the recovery room then when you are awake and stable you will go to the short stay unit for preparation to be discharged. If you use a Bi-PAP or C-PAP machine, please bring it with you and leave it in the car in case it is needed in recovery room.

## 2022-10-12 ENCOUNTER — PROCEDURE VISIT (OUTPATIENT)
Dept: OBGYN CLINIC | Age: 37
End: 2022-10-12
Payer: COMMERCIAL

## 2022-10-12 ENCOUNTER — HOSPITAL ENCOUNTER (OUTPATIENT)
Age: 37
Setting detail: SPECIMEN
Discharge: HOME | End: 2022-10-12

## 2022-10-12 VITALS
WEIGHT: 132.31 LBS | BODY MASS INDEX: 21.36 KG/M2 | DIASTOLIC BLOOD PRESSURE: 74 MMHG | SYSTOLIC BLOOD PRESSURE: 118 MMHG

## 2022-10-12 DIAGNOSIS — Z97.5 IUD (INTRAUTERINE DEVICE) IN PLACE: ICD-10-CM

## 2022-10-12 DIAGNOSIS — N92.1 MENORRHAGIA WITH IRREGULAR CYCLE: Primary | ICD-10-CM

## 2022-10-12 DIAGNOSIS — N94.6 DYSMENORRHEA: ICD-10-CM

## 2022-10-12 DIAGNOSIS — R10.2 PELVIC PAIN: ICD-10-CM

## 2022-10-12 PROCEDURE — 58100 BIOPSY OF UTERUS LINING: CPT | Performed by: OBSTETRICS & GYNECOLOGY

## 2022-10-12 PROCEDURE — 81025 URINE PREGNANCY TEST: CPT | Performed by: OBSTETRICS & GYNECOLOGY

## 2022-10-12 NOTE — PROGRESS NOTES
10/12/2022    Leonie Beltrán is a 44 y.o. female, [id number]      No LMP recorded. (Menstrual status: IUD). Chief Complaint   Patient presents with    Procedure     EMB    Surgical Consult     Discuss ablation with Bilateral Salpingectomy        LABS:  UPT: negative    Hospital Outpatient Visit on 10/07/2022   Component Date Value Ref Range Status    WBC 10/07/2022 5.8  3.5 - 11.0 k/uL Final    RBC 10/07/2022 4.45  4.0 - 5.2 m/uL Final    Hemoglobin 10/07/2022 12.9  12.0 - 16.0 g/dL Final    Hematocrit 10/07/2022 39.0  36 - 46 % Final    MCV 10/07/2022 87.5  80 - 100 fL Final    MCH 10/07/2022 28.9  26 - 34 pg Final    MCHC 10/07/2022 33.1  31 - 37 g/dL Final    RDW 10/07/2022 13.5  11.5 - 14.9 % Final    Platelets 58/11/8093 192  150 - 450 k/uL Final    MPV 10/07/2022 8.9  6.0 - 12.0 fL Final    Seg Neutrophils 10/07/2022 63  36 - 66 % Final    Lymphocytes 10/07/2022 28  24 - 44 % Final    Monocytes 10/07/2022 6  1 - 7 % Final    Eosinophils % 10/07/2022 2  0 - 4 % Final    Basophils 10/07/2022 1  0 - 2 % Final    Segs Absolute 10/07/2022 3.60  1.3 - 9.1 k/uL Final    Absolute Lymph # 10/07/2022 1.60  1.0 - 4.8 k/uL Final    Absolute Mono # 10/07/2022 0.40  0.1 - 1.3 k/uL Final    Absolute Eos # 10/07/2022 0.10  0.0 - 0.4 k/uL Final    Basophils Absolute 10/07/2022 0.00  0.0 - 0.2 k/uL Final    Glucose 10/07/2022 95  70 - 99 mg/dL Final    BUN 10/07/2022 6  6 - 20 mg/dL Final    Creatinine 10/07/2022 0.63  0.50 - 0.90 mg/dL Final    Est, Glom Filt Rate 10/07/2022 >60  >60 mL/min/1.73m2 Final    Comment:       Effective Oct 3, 2022        These results are not intended for use in patients <25years of age. eGFR results are calculated without a race factor using the 2021 CKD-EPI equation. Careful clinical correlation is recommended, particularly when comparing to results   calculated using previous equations.   The CKD-EPI equation is less accurate in patients with extremes of muscle mass, <=5  Postmeno           <=8  Male               <=3  If HCG results do not concur with clinical observations, additional testing to confirm   results is recommended. Hemoglobin A1C 10/07/2022 5.2  4.0 - 6.0 % Final    Estimated Avg Glucose 10/07/2022 103  mg/dL Final    Comment: The ADA and AACC recommend providing the estimated average glucose result to permit better   patient understanding of their HBA1c result. TSH 10/07/2022 0.95  0.30 - 5.00 uIU/mL Final   Hospital Outpatient Visit on 09/02/2022   Component Date Value Ref Range Status    Specimen Description 09/02/2022 . CERVIX   Final    C. trachomatis DNA 09/02/2022 NEGATIVE  NEGATIVE Final    Comment: CHLAMYDIA TRACHOMATIS DNA not detected by nucleic acid amplification. This test is intended for medical purposes only and is not valid for the evaluation of   suspected sexual abuse or for other forensic purposes. In certain contexts, culture may be required to meet applicable laws and regulations for   diagnosis of C. trachomatis and N. gonorrhoeae infections. Per 2014  CDC recommendations, this test does not include confirmation of positive results   by an alternative nucleic acid target. N. gonorrhoeae DNA 09/02/2022 NEGATIVE  NEGATIVE Final    Comment: NEISSERIA GONORRHOEAE DNA not detected by nucleic acid amplification. This test is intended for medical purposes only and is not valid for the evaluation of   suspected sexual abuse or for other forensic purposes. In certain contexts, culture may be required to meet applicable laws and regulations for   diagnosis of C. trachomatis and N. gonorrhoeae infections. Per 2014  CDC recommendations, this test does not include confirmation of positive results   by an alternative nucleic acid target.      Procedure visit on 09/02/2022   Component Date Value Ref Range Status    Preg Test, Ur 09/02/2022 negative   Final   ]    Past Medical History:   Diagnosis Date    Abdominal pain 3/27/2015    Acid reflux     Allergic reaction to allergy skin test 09/2020    legumes, wheat, peanuts almonds    Anxiety     Fibromyalgia     Headache     migraines    HPV (human papilloma virus) anogenital infection     Insomnia     Lumbar radicular pain 9/7/2018    Movement disorder     spasms, B&B    Mycoplasma infection 9/4/2015    Neuropathy     left hands, feet    Numbness and tingling 12/19/2017    Post partum depression 3/27/2015    Rheumatic fever     Spinal stenosis     Wears glasses          Past Surgical History:   Procedure Laterality Date    CHOLECYSTECTOMY      COLONOSCOPY  08/31/2021    COLONOSCOPY N/A 8/31/2021    COLONOSCOPY WITH BIOPSY performed by Maribeth Alfred MD at [facility] Dr. Edel Titus in [facility] [place]      removed some teeth at age 1    INTRAUTERINE DEVICE INSERTION  2017    TONSILLECTOMY AND ADENOIDECTOMY Bilateral 01/07/2016    UPPER GASTROINTESTINAL ENDOSCOPY  08/31/2021      EGD BIOPSY    UPPER GASTROINTESTINAL ENDOSCOPY N/A 8/31/2021    EGD BIOPSY performed by Maribeth Alfred MD at [address] [facility].         Family History   Problem Relation Age of Onset    No Known Problems Paternal Grandfather     High Blood Pressure Paternal Grandmother     Breast Cancer Paternal Grandmother     High Cholesterol Paternal Grandmother     Stroke Paternal Grandmother     No Known Problems Maternal Grandmother     No Known Problems Maternal Grandfather     Diabetes Father     High Blood Pressure Father     High Cholesterol Father     Obesity Father     Other Father     Depression Mother     Asthma Brother     Other Brother         Peanut allergies and multiple other allergies    Depression Brother     Colon Cancer Paternal Uncle     Uterine Cancer Neg Hx     Ovarian Cancer Neg Hx     Heart Disease Neg Hx          Social History     Tobacco Use    Smoking status: Every Day     Packs/day: 0.50     Years: 17.00     Pack years: 8.50     Types: Cigarettes     Start date: 6/17/2004    Smokeless tobacco: Never    Tobacco comments:     is down to a half pack per day   Vaping Use    Vaping Use: Never used   Substance Use Topics    Alcohol use: Not Currently     Comment: Mixed drink maybe 2x a month    Drug use: Yes     Frequency: 7.0 times per week     Types: Marijuana Seabron Barban)     Comment: Patient states smoking to help her eat         Current Outpatient Medications   Medication Sig Dispense Refill    omeprazole (PRILOSEC) 20 MG delayed release capsule TAKE ONE CAPSULE BY MOUTH DAILY (Patient not taking: Reported on 10/7/2022) 30 capsule 3    topiramate (TOPAMAX) 25 MG tablet 50mg AM, 25mg PM (Patient not taking: Reported on 9/2/2022) 270 tablet 1    QUEtiapine (SEROQUEL) 25 MG tablet 12.5mg to 25mg QHS PRN (Patient not taking: Reported on 9/2/2022) 30 tablet 3    cetirizine (ZYRTEC) 10 MG tablet       EPINEPHrine (EPIPEN) 0.3 MG/0.3ML SOAJ injection Inject 0.3 mg into the muscle as needed Use as directed for allergic reaction       No current facility-administered medications for this visit. Allergies as of 10/12/2022 - Fully Reviewed 10/12/2022   Allergen Reaction Noted    Latex Other (See Comments) 12/19/2014    Banana Anaphylaxis 09/20/2019    Banana extract allergy skin test Anaphylaxis 09/20/2019    [place] (diagnostic)  10/02/2020    Avocado  04/15/2018    Buckwheat  10/02/2020    Coconut fatty acids  09/20/2019    Coconut oil  04/15/2018    Kiwi extract  04/15/2018    [place] flavor [flavoring agent]  09/20/2019    Other  10/02/2020    Papaya derivatives  09/20/2019    Peanut (diagnostic)  10/02/2020    Tape [adhesive tape] Other (See Comments) 01/07/2016    Wheat bran  10/02/2020    Maxalt [rizatriptan benzoate] Nausea And Vomiting 11/08/2019         Diagnostics:  US NON OB TRANSVAGINAL    Result Date: 9/22/2022  Exam Date: 9/16/2022 Dx: IUD check, unsuccessful attempt to remove IUD Uterus: 9.7x6. 3x4.9cm, anteverted Endometrial Stripe: fluid visualized within the endometrium IUD visualized and appears in appropriate position Right Ovary: visualized and appears normal in size and shape Left Ovary: dominant follicles vs. Simple appearing ovarian cysts   - 2.6x2. 1x1/5cm   - 2.2x1. 9x1.8cm Trace free fluid in pelvis in posterior cul-de-sac     US PELVIS COMPLETE    Result Date: 9/22/2022  Exam Date: 9/16/2022 Dx: IUD check, unsuccessful attempt to remove IUD Uterus: 9.7x6. 3x4.9cm, anteverted Endometrial Stripe: fluid visualized within the endometrium IUD visualized and appears in appropriate position Right Ovary: visualized and appears normal in size and shape Left Ovary: dominant follicles vs. Simple appearing ovarian cysts   - 2.6x2. 1x1/5cm   - 2.2x1. 9x1.8cm Trace free fluid in pelvis in posterior cul-de-sac         Blood pressure 118/74, weight 132 lb 5 oz (60 kg), not currently breastfeeding. Chaperone for Intimate Exam  Chaperone was offered and accepted as part of the rooming process. Chaperone: Alva Schmidt      A time out was called by the Chaperone listed above while ALL participants were quiet and attentive. The procedure to be completed was confirmed, with the appropriate laterality demarcated prior, if appropriate, as well as the patients name and a unique identifier, her date of birth. All questions were answered to her satisfaction. The consent was signed prior to the time out and all the risks were discussed in detail. The patient was counseled on the procedure. Risks, benefits and alternatives were reviewed. The patient is aware that this is diagnostic and not curative and a second procedure may be needed. A consent was reviewed and obtained. The patient was positioned comfortably on the exam table. After a bi-manual exam; the uterus was found to be  anteverted with a size of 9 cm. There was no adnexal masses and the bladder was smooth, non-tender and without palpable masses.  A sterile speculum was placed into the vagina and the cervix was identified. It was stabilized with an allis clamp. It was cleansed with betadine and the aspirator was then gently passed into the endometrial cavity. Tissue was obtained and sent to pathology. The patient tolerated the procedure well. Post procedure restrictions were reviewed and given to the patient. .  All counts and instruments were correct at the end of the procedure. Assessment:   Diagnosis Orders   1. Menorrhagia with irregular cycle  41796 - [place] BIOPSY OF UTERUS LINING    Specimen to Pathology Outpatient    POCT urine pregnancy      2. Dysmenorrhea  32635 - [place] BIOPSY OF UTERUS LINING    Specimen to Pathology Outpatient    POCT urine pregnancy      3. Pelvic pain  62150 - [place] BIOPSY OF UTERUS LINING    Specimen to Pathology Outpatient    POCT urine pregnancy      4.  IUD (intrauterine device) in place          Patient Active Problem List    Diagnosis Date Noted    Chronic diarrhea     Weight loss due to medication 04/24/2020    Tobacco abuse 12/04/2018    Fibromyalgia 07/06/2018    Osteoarthritis 07/06/2018     Early onset per dr Chris Contreras       Major depressive disorder, recurrent, moderate ([facility] [address] 07/06/2018    Hypokalemia 07/06/2018    Numbness and tingling of both lower extremities 04/24/2018    Chronic bilateral low back pain with bilateral sciatica 12/19/2017    Hemangioma of liver 10/24/2017    Spinal stenosis     Chronic low back pain with bilateral sciatica 05/05/2017    Psychophysiological insomnia 03/30/2017    Fatigue 03/30/2017    HPV (human papilloma virus) anogenital infection     Nasal congestion 03/27/2015    Anxiety            PLAN:  Pathology pending    Plan for Hscope, D&C, Myosure, Novasure endometrial ablation, Lscope, GT Dee DO

## 2022-10-14 LAB — SURGICAL PATHOLOGY REPORT: NORMAL

## 2022-10-19 ENCOUNTER — ANESTHESIA EVENT (OUTPATIENT)
Dept: OPERATING ROOM | Age: 37
End: 2022-10-19
Payer: COMMERCIAL

## 2022-10-20 ENCOUNTER — ANESTHESIA (OUTPATIENT)
Dept: OPERATING ROOM | Age: 37
End: 2022-10-20
Payer: COMMERCIAL

## 2022-10-20 ENCOUNTER — HOSPITAL ENCOUNTER (OUTPATIENT)
Age: 37
Setting detail: OUTPATIENT SURGERY
Discharge: HOME | End: 2022-10-20
Attending: OBSTETRICS & GYNECOLOGY | Admitting: OBSTETRICS & GYNECOLOGY
Payer: COMMERCIAL

## 2022-10-20 VITALS
TEMPERATURE: 97.7 F | OXYGEN SATURATION: 100 % | RESPIRATION RATE: 14 BRPM | BODY MASS INDEX: 20.89 KG/M2 | HEIGHT: 66 IN | HEART RATE: 60 BPM | DIASTOLIC BLOOD PRESSURE: 62 MMHG | WEIGHT: 130 LBS | SYSTOLIC BLOOD PRESSURE: 116 MMHG

## 2022-10-20 DIAGNOSIS — T83.32XA INTRAUTERINE CONTRACEPTIVE DEVICE THREADS LOST, INITIAL ENCOUNTER: ICD-10-CM

## 2022-10-20 DIAGNOSIS — G89.18 POSTOPERATIVE PAIN: Primary | ICD-10-CM

## 2022-10-20 DIAGNOSIS — N92.0 MENORRHAGIA WITH REGULAR CYCLE: ICD-10-CM

## 2022-10-20 PROBLEM — Z98.890 S/P ENDOMETRIAL ABLATION: Status: ACTIVE | Noted: 2022-10-20

## 2022-10-20 PROBLEM — N94.89 ADNEXAL MASS: Status: ACTIVE | Noted: 2022-10-20

## 2022-10-20 PROBLEM — N93.9 ABNORMAL UTERINE BLEEDING (AUB): Status: ACTIVE | Noted: 2022-10-20

## 2022-10-20 PROBLEM — N83.8 CYST OF FALLOPIAN TUBE: Status: ACTIVE | Noted: 2022-10-20

## 2022-10-20 LAB — HCG, PREGNANCY URINE (POC): NEGATIVE

## 2022-10-20 PROCEDURE — 7100000001 HC PACU RECOVERY - ADDTL 15 MIN: Performed by: OBSTETRICS & GYNECOLOGY

## 2022-10-20 PROCEDURE — 6370000000 HC RX 637 (ALT 250 FOR IP): Performed by: ANESTHESIOLOGY

## 2022-10-20 PROCEDURE — 2580000003 HC RX 258: Performed by: ANESTHESIOLOGY

## 2022-10-20 PROCEDURE — 88305 TISSUE EXAM BY PATHOLOGIST: CPT

## 2022-10-20 PROCEDURE — 6360000002 HC RX W HCPCS

## 2022-10-20 PROCEDURE — 6360000002 HC RX W HCPCS: Performed by: ANESTHESIOLOGY

## 2022-10-20 PROCEDURE — 7100000031 HC ASPR PHASE II RECOVERY - ADDTL 15 MIN: Performed by: OBSTETRICS & GYNECOLOGY

## 2022-10-20 PROCEDURE — 2500000003 HC RX 250 WO HCPCS: Performed by: NURSE ANESTHETIST, CERTIFIED REGISTERED

## 2022-10-20 PROCEDURE — 3700000001 HC ADD 15 MINUTES (ANESTHESIA): Performed by: OBSTETRICS & GYNECOLOGY

## 2022-10-20 PROCEDURE — 7100000000 HC PACU RECOVERY - FIRST 15 MIN: Performed by: OBSTETRICS & GYNECOLOGY

## 2022-10-20 PROCEDURE — 3700000000 HC ANESTHESIA ATTENDED CARE: Performed by: OBSTETRICS & GYNECOLOGY

## 2022-10-20 PROCEDURE — 7100000030 HC ASPR PHASE II RECOVERY - FIRST 15 MIN: Performed by: OBSTETRICS & GYNECOLOGY

## 2022-10-20 PROCEDURE — 3600000013 HC SURGERY LEVEL 3 ADDTL 15MIN: Performed by: OBSTETRICS & GYNECOLOGY

## 2022-10-20 PROCEDURE — 7100000011 HC PHASE II RECOVERY - ADDTL 15 MIN: Performed by: OBSTETRICS & GYNECOLOGY

## 2022-10-20 PROCEDURE — 2720000010 HC SURG SUPPLY STERILE: Performed by: OBSTETRICS & GYNECOLOGY

## 2022-10-20 PROCEDURE — 58563 HYSTEROSCOPY ABLATION: CPT | Performed by: OBSTETRICS & GYNECOLOGY

## 2022-10-20 PROCEDURE — 7100000010 HC PHASE II RECOVERY - FIRST 15 MIN: Performed by: OBSTETRICS & GYNECOLOGY

## 2022-10-20 PROCEDURE — 2709999900 HC NON-CHARGEABLE SUPPLY: Performed by: OBSTETRICS & GYNECOLOGY

## 2022-10-20 PROCEDURE — 88300 SURGICAL PATH GROSS: CPT

## 2022-10-20 PROCEDURE — 6360000002 HC RX W HCPCS: Performed by: NURSE ANESTHETIST, CERTIFIED REGISTERED

## 2022-10-20 PROCEDURE — 58700 REMOVAL OF FALLOPIAN TUBE: CPT | Performed by: OBSTETRICS & GYNECOLOGY

## 2022-10-20 PROCEDURE — 3600000003 HC SURGERY LEVEL 3 BASE: Performed by: OBSTETRICS & GYNECOLOGY

## 2022-10-20 PROCEDURE — 81025 URINE PREGNANCY TEST: CPT

## 2022-10-20 PROCEDURE — 2500000003 HC RX 250 WO HCPCS: Performed by: OBSTETRICS & GYNECOLOGY

## 2022-10-20 RX ORDER — SODIUM CHLORIDE 0.9 % (FLUSH) 0.9 %
5-40 SYRINGE (ML) INJECTION PRN
Status: DISCONTINUED | OUTPATIENT
Start: 2022-10-20 | End: 2022-10-20 | Stop reason: HOSPADM

## 2022-10-20 RX ORDER — HYDROCODONE BITARTRATE AND ACETAMINOPHEN 5; 325 MG/1; MG/1
1 TABLET ORAL EVERY 6 HOURS PRN
Status: DISCONTINUED | OUTPATIENT
Start: 2022-10-20 | End: 2022-10-20 | Stop reason: HOSPADM

## 2022-10-20 RX ORDER — LABETALOL HYDROCHLORIDE 5 MG/ML
10 INJECTION, SOLUTION INTRAVENOUS
Status: DISCONTINUED | OUTPATIENT
Start: 2022-10-20 | End: 2022-10-20 | Stop reason: HOSPADM

## 2022-10-20 RX ORDER — PROPOFOL 10 MG/ML
INJECTION, EMULSION INTRAVENOUS PRN
Status: DISCONTINUED | OUTPATIENT
Start: 2022-10-20 | End: 2022-10-20 | Stop reason: SDUPTHER

## 2022-10-20 RX ORDER — ROCURONIUM BROMIDE 10 MG/ML
INJECTION, SOLUTION INTRAVENOUS PRN
Status: DISCONTINUED | OUTPATIENT
Start: 2022-10-20 | End: 2022-10-20 | Stop reason: SDUPTHER

## 2022-10-20 RX ORDER — ACETAMINOPHEN 500 MG
1000 TABLET ORAL EVERY 6 HOURS PRN
Qty: 30 TABLET | Refills: 1 | Status: SHIPPED | OUTPATIENT
Start: 2022-10-20 | End: 2022-10-20 | Stop reason: SDUPTHER

## 2022-10-20 RX ORDER — HYDRALAZINE HYDROCHLORIDE 20 MG/ML
10 INJECTION INTRAMUSCULAR; INTRAVENOUS
Status: DISCONTINUED | OUTPATIENT
Start: 2022-10-20 | End: 2022-10-20 | Stop reason: HOSPADM

## 2022-10-20 RX ORDER — ONDANSETRON 4 MG/1
4 TABLET, FILM COATED ORAL EVERY 8 HOURS PRN
Qty: 30 TABLET | Refills: 1 | Status: SHIPPED | OUTPATIENT
Start: 2022-10-20 | End: 2022-10-20 | Stop reason: SDUPTHER

## 2022-10-20 RX ORDER — DIPHENHYDRAMINE HYDROCHLORIDE 50 MG/ML
12.5 INJECTION INTRAMUSCULAR; INTRAVENOUS
Status: DISCONTINUED | OUTPATIENT
Start: 2022-10-20 | End: 2022-10-20 | Stop reason: HOSPADM

## 2022-10-20 RX ORDER — IBUPROFEN 600 MG/1
600 TABLET ORAL EVERY 6 HOURS PRN
Qty: 40 TABLET | Refills: 1 | Status: SHIPPED | OUTPATIENT
Start: 2022-10-20

## 2022-10-20 RX ORDER — KETOROLAC TROMETHAMINE 30 MG/ML
INJECTION, SOLUTION INTRAMUSCULAR; INTRAVENOUS PRN
Status: DISCONTINUED | OUTPATIENT
Start: 2022-10-20 | End: 2022-10-20 | Stop reason: SDUPTHER

## 2022-10-20 RX ORDER — SENNA AND DOCUSATE SODIUM 50; 8.6 MG/1; MG/1
1 TABLET, FILM COATED ORAL DAILY
Qty: 30 TABLET | Refills: 0 | Status: SHIPPED | OUTPATIENT
Start: 2022-10-20 | End: 2022-10-20 | Stop reason: SDUPTHER

## 2022-10-20 RX ORDER — ONDANSETRON 2 MG/ML
INJECTION INTRAMUSCULAR; INTRAVENOUS PRN
Status: DISCONTINUED | OUTPATIENT
Start: 2022-10-20 | End: 2022-10-20 | Stop reason: SDUPTHER

## 2022-10-20 RX ORDER — FENTANYL CITRATE 50 UG/ML
INJECTION, SOLUTION INTRAMUSCULAR; INTRAVENOUS PRN
Status: DISCONTINUED | OUTPATIENT
Start: 2022-10-20 | End: 2022-10-20 | Stop reason: SDUPTHER

## 2022-10-20 RX ORDER — LIDOCAINE HYDROCHLORIDE 10 MG/ML
1 INJECTION, SOLUTION EPIDURAL; INFILTRATION; INTRACAUDAL; PERINEURAL
Status: DISCONTINUED | OUTPATIENT
Start: 2022-10-20 | End: 2022-10-20 | Stop reason: HOSPADM

## 2022-10-20 RX ORDER — BUPIVACAINE HYDROCHLORIDE 2.5 MG/ML
INJECTION, SOLUTION EPIDURAL; INFILTRATION; INTRACAUDAL PRN
Status: DISCONTINUED | OUTPATIENT
Start: 2022-10-20 | End: 2022-10-20 | Stop reason: ALTCHOICE

## 2022-10-20 RX ORDER — SODIUM CHLORIDE 9 MG/ML
25 INJECTION, SOLUTION INTRAVENOUS PRN
Status: DISCONTINUED | OUTPATIENT
Start: 2022-10-20 | End: 2022-10-20 | Stop reason: HOSPADM

## 2022-10-20 RX ORDER — ACETAMINOPHEN 500 MG
1000 TABLET ORAL ONCE
Status: COMPLETED | OUTPATIENT
Start: 2022-10-20 | End: 2022-10-20

## 2022-10-20 RX ORDER — SODIUM CHLORIDE 9 MG/ML
INJECTION, SOLUTION INTRAVENOUS PRN
Status: DISCONTINUED | OUTPATIENT
Start: 2022-10-20 | End: 2022-10-20 | Stop reason: HOSPADM

## 2022-10-20 RX ORDER — SODIUM CHLORIDE 0.9 % (FLUSH) 0.9 %
5-40 SYRINGE (ML) INJECTION EVERY 12 HOURS SCHEDULED
Status: DISCONTINUED | OUTPATIENT
Start: 2022-10-20 | End: 2022-10-20 | Stop reason: HOSPADM

## 2022-10-20 RX ORDER — MIDAZOLAM HYDROCHLORIDE 1 MG/ML
INJECTION INTRAMUSCULAR; INTRAVENOUS PRN
Status: DISCONTINUED | OUTPATIENT
Start: 2022-10-20 | End: 2022-10-20 | Stop reason: SDUPTHER

## 2022-10-20 RX ORDER — ACETAMINOPHEN 500 MG
1000 TABLET ORAL EVERY 6 HOURS PRN
Qty: 30 TABLET | Refills: 1 | Status: SHIPPED | OUTPATIENT
Start: 2022-10-20

## 2022-10-20 RX ORDER — METOCLOPRAMIDE HYDROCHLORIDE 5 MG/ML
10 INJECTION INTRAMUSCULAR; INTRAVENOUS
Status: COMPLETED | OUTPATIENT
Start: 2022-10-20 | End: 2022-10-20

## 2022-10-20 RX ORDER — PROCHLORPERAZINE EDISYLATE 5 MG/ML
5 INJECTION INTRAMUSCULAR; INTRAVENOUS
Status: COMPLETED | OUTPATIENT
Start: 2022-10-20 | End: 2022-10-20

## 2022-10-20 RX ORDER — ONDANSETRON 2 MG/ML
4 INJECTION INTRAMUSCULAR; INTRAVENOUS EVERY 6 HOURS PRN
Status: DISCONTINUED | OUTPATIENT
Start: 2022-10-20 | End: 2022-10-20 | Stop reason: HOSPADM

## 2022-10-20 RX ORDER — SENNA AND DOCUSATE SODIUM 50; 8.6 MG/1; MG/1
1 TABLET, FILM COATED ORAL DAILY
Qty: 30 TABLET | Refills: 0 | Status: SHIPPED | OUTPATIENT
Start: 2022-10-20

## 2022-10-20 RX ORDER — DEXAMETHASONE SODIUM PHOSPHATE 4 MG/ML
INJECTION, SOLUTION INTRA-ARTICULAR; INTRALESIONAL; INTRAMUSCULAR; INTRAVENOUS; SOFT TISSUE PRN
Status: DISCONTINUED | OUTPATIENT
Start: 2022-10-20 | End: 2022-10-20 | Stop reason: SDUPTHER

## 2022-10-20 RX ORDER — SCOLOPAMINE TRANSDERMAL SYSTEM 1 MG/1
1 PATCH, EXTENDED RELEASE TRANSDERMAL ONCE
Status: DISCONTINUED | OUTPATIENT
Start: 2022-10-20 | End: 2022-10-20 | Stop reason: HOSPADM

## 2022-10-20 RX ORDER — ONDANSETRON 4 MG/1
4 TABLET, FILM COATED ORAL EVERY 8 HOURS PRN
Qty: 30 TABLET | Refills: 1 | Status: SHIPPED | OUTPATIENT
Start: 2022-10-20

## 2022-10-20 RX ORDER — OXYCODONE HYDROCHLORIDE 5 MG/1
5 TABLET ORAL EVERY 6 HOURS PRN
Qty: 20 TABLET | Refills: 0 | Status: SHIPPED | OUTPATIENT
Start: 2022-10-20 | End: 2022-10-28

## 2022-10-20 RX ORDER — SIMETHICONE 80 MG
80 TABLET,CHEWABLE ORAL 4 TIMES DAILY PRN
Qty: 180 TABLET | Refills: 3 | Status: SHIPPED | OUTPATIENT
Start: 2022-10-20

## 2022-10-20 RX ORDER — SODIUM CHLORIDE, SODIUM LACTATE, POTASSIUM CHLORIDE, CALCIUM CHLORIDE 600; 310; 30; 20 MG/100ML; MG/100ML; MG/100ML; MG/100ML
INJECTION, SOLUTION INTRAVENOUS CONTINUOUS
Status: DISCONTINUED | OUTPATIENT
Start: 2022-10-20 | End: 2022-10-20 | Stop reason: HOSPADM

## 2022-10-20 RX ORDER — FENTANYL CITRATE 50 UG/ML
25 INJECTION, SOLUTION INTRAMUSCULAR; INTRAVENOUS EVERY 5 MIN PRN
Status: DISCONTINUED | OUTPATIENT
Start: 2022-10-20 | End: 2022-10-20 | Stop reason: HOSPADM

## 2022-10-20 RX ORDER — LIDOCAINE HYDROCHLORIDE 20 MG/ML
INJECTION, SOLUTION EPIDURAL; INFILTRATION; INTRACAUDAL; PERINEURAL PRN
Status: DISCONTINUED | OUTPATIENT
Start: 2022-10-20 | End: 2022-10-20 | Stop reason: SDUPTHER

## 2022-10-20 RX ADMIN — DEXAMETHASONE SODIUM PHOSPHATE 4 MG: 4 INJECTION, SOLUTION INTRAMUSCULAR; INTRAVENOUS at 10:20

## 2022-10-20 RX ADMIN — PROCHLORPERAZINE EDISYLATE 5 MG: 5 INJECTION INTRAMUSCULAR; INTRAVENOUS at 11:37

## 2022-10-20 RX ADMIN — SODIUM CHLORIDE, POTASSIUM CHLORIDE, SODIUM LACTATE AND CALCIUM CHLORIDE: 600; 310; 30; 20 INJECTION, SOLUTION INTRAVENOUS at 07:03

## 2022-10-20 RX ADMIN — METOCLOPRAMIDE 10 MG: 5 INJECTION, SOLUTION INTRAMUSCULAR; INTRAVENOUS at 12:17

## 2022-10-20 RX ADMIN — KETOROLAC TROMETHAMINE 30 MG: 30 INJECTION, SOLUTION INTRAMUSCULAR; INTRAVENOUS at 10:30

## 2022-10-20 RX ADMIN — LIDOCAINE HYDROCHLORIDE 100 MG: 20 INJECTION, SOLUTION EPIDURAL; INFILTRATION; INTRACAUDAL; PERINEURAL at 09:19

## 2022-10-20 RX ADMIN — ROCURONIUM BROMIDE 50 MG: 10 INJECTION, SOLUTION INTRAVENOUS at 09:19

## 2022-10-20 RX ADMIN — PROMETHAZINE HYDROCHLORIDE 12.5 MG: 25 INJECTION INTRAMUSCULAR; INTRAVENOUS at 13:30

## 2022-10-20 RX ADMIN — ACETAMINOPHEN 1000 MG: 500 TABLET, FILM COATED ORAL at 08:25

## 2022-10-20 RX ADMIN — CEFAZOLIN 2000 MG: 10 INJECTION, POWDER, FOR SOLUTION INTRAVENOUS at 09:29

## 2022-10-20 RX ADMIN — SODIUM CHLORIDE 500 ML: 9 INJECTION, SOLUTION INTRAVENOUS at 12:40

## 2022-10-20 RX ADMIN — ONDANSETRON 4 MG: 2 INJECTION INTRAMUSCULAR; INTRAVENOUS at 10:20

## 2022-10-20 RX ADMIN — FENTANYL CITRATE 100 MCG: 50 INJECTION, SOLUTION INTRAMUSCULAR; INTRAVENOUS at 09:19

## 2022-10-20 RX ADMIN — ONDANSETRON 4 MG: 2 INJECTION INTRAMUSCULAR; INTRAVENOUS at 12:47

## 2022-10-20 RX ADMIN — SUGAMMADEX 200 MG: 100 INJECTION, SOLUTION INTRAVENOUS at 10:55

## 2022-10-20 RX ADMIN — MIDAZOLAM 2 MG: 1 INJECTION INTRAMUSCULAR; INTRAVENOUS at 09:19

## 2022-10-20 RX ADMIN — PROPOFOL 200 MG: 10 INJECTION, EMULSION INTRAVENOUS at 09:19

## 2022-10-20 ASSESSMENT — PAIN - FUNCTIONAL ASSESSMENT: PAIN_FUNCTIONAL_ASSESSMENT: 0-10

## 2022-10-20 ASSESSMENT — PAIN DESCRIPTION - LOCATION
LOCATION: ABDOMEN
LOCATION: ABDOMEN

## 2022-10-20 ASSESSMENT — PAIN SCALES - GENERAL
PAINLEVEL_OUTOF10: 3
PAINLEVEL_OUTOF10: 4
PAINLEVEL_OUTOF10: 0
PAINLEVEL_OUTOF10: 0
PAINLEVEL_OUTOF10: 4
PAINLEVEL_OUTOF10: 4

## 2022-10-20 ASSESSMENT — PAIN DESCRIPTION - PAIN TYPE
TYPE: SURGICAL PAIN
TYPE: SURGICAL PAIN

## 2022-10-20 ASSESSMENT — PAIN DESCRIPTION - DESCRIPTORS
DESCRIPTORS: PRESSURE
DESCRIPTORS: CRAMPING

## 2022-10-20 ASSESSMENT — LIFESTYLE VARIABLES: SMOKING_STATUS: 1

## 2022-10-20 ASSESSMENT — PAIN DESCRIPTION - ORIENTATION: ORIENTATION: LOWER

## 2022-10-20 NOTE — H&P
OB/GYN Pre-Op H&P  [facility]    Patient Name: Cyrus Byrd     Patient : 1985  Room/Bed: Room/bed info not found  Admission Date/Time: No admission date for patient encounter. Primary Care Physician: SATHISH Condon CNP  MRN: [id number]    Date: 10/19/2022  Time: 10:17 PM    The patient was seen in pre-op holding. She is here for Hysteroscopy, myosure, novasure endometrial ablation, IUD retrieval, and laparoscopic medically necessary bilateral salpingectomy. Jennifer Ruizishan 44 y.o. female, [id number] presents with abnormal uterine bleeding. Patient has failed conservative management and desires surgical management at this time. Patient currently has an IUD in place which is due for removal with one failed outpatient attempt of IUD removal. Patients consents to both IUD retrieval and a medically necessary bilateral salpingectomy prior to endometrial ablation. EMB benign on 10/12/22. US on 22 reveals uterus measuring 9.7x6. 3x4.9 cm with IUD in place with a simple-appearing left ovarian cyst. Patient has a prior surgical history of laparoscopic cholecystectomy. The procedure risks and complications were reviewed. The labs, Consent, and H&P were reviewed and updated. The patient was counseled on the possibility of  the need of a second surgery. The patient voiced understanding and had all of her questions answered. The possibility of incomplete removal of abnormal tissue was discussed.     OBSTETRICAL HISTORY:   OB History    Para Term  AB Living   2 2 2 0 0 2   SAB IAB Ectopic Molar Multiple Live Births   0 0 0 0 0 0      # Outcome Date GA Lbr Andrew/2nd Weight Sex Delivery Anes PTL Lv   2 Term      Vag-Spont      1 Term      Vag-Spont          PAST MEDICAL HISTORY:   has a past medical history of Abdominal pain, Acid reflux, Allergic reaction to allergy skin test, Anxiety, Fibromyalgia, Headache, HPV (human papilloma virus) anogenital infection, Insomnia, Lumbar radicular pain, Movement disorder, Mycoplasma infection, Neuropathy, Numbness and tingling, Post partum depression, Rheumatic fever, Spinal stenosis, and Wears glasses. PAST SURGICAL HISTORY:   has a past surgical history that includes Cholecystectomy; Dental surgery; Tonsillectomy and adenoidectomy (Bilateral, 01/07/2016); intrauterine device insertion (2017); Colposcopy; Upper gastrointestinal endoscopy (08/31/2021); Colonoscopy (08/31/2021); Upper gastrointestinal endoscopy (N/A, 8/31/2021); and Colonoscopy (N/A, 8/31/2021). ALLERGIES:  Allergies as of 09/29/2022 - Fully Reviewed 09/02/2022   Allergen Reaction Noted    Latex Other (See Comments) 12/19/2014    Banana Anaphylaxis 09/20/2019    Banana extract allergy skin test Anaphylaxis 09/20/2019    [place] (diagnostic)  10/02/2020    Avocado  04/15/2018    Buckwheat  10/02/2020    Coconut fatty acids  09/20/2019    Coconut oil  04/15/2018    Kiwi extract  04/15/2018    [place] flavor [flavoring agent]  09/20/2019    Other  10/02/2020    Papaya derivatives  09/20/2019    Peanut (diagnostic)  10/02/2020    Tape [adhesive tape] Other (See Comments) 01/07/2016    Wheat bran  10/02/2020    Maxalt [rizatriptan benzoate] Nausea And Vomiting 11/08/2019       MEDICATIONS:  No current facility-administered medications for this encounter.      Current Outpatient Medications   Medication Sig Dispense Refill    omeprazole (PRILOSEC) 20 MG delayed release capsule TAKE ONE CAPSULE BY MOUTH DAILY (Patient not taking: Reported on 10/7/2022) 30 capsule 3    topiramate (TOPAMAX) 25 MG tablet 50mg AM, 25mg PM (Patient not taking: Reported on 9/2/2022) 270 tablet 1    QUEtiapine (SEROQUEL) 25 MG tablet 12.5mg to 25mg QHS PRN (Patient not taking: Reported on 9/2/2022) 30 tablet 3    cetirizine (ZYRTEC) 10 MG tablet       EPINEPHrine (EPIPEN) 0.3 MG/0.3ML SOAJ injection Inject 0.3 mg into the muscle as needed Use as directed for allergic reaction FAMILY HISTORY:  family history includes Asthma in her brother; Breast Cancer in her paternal grandmother; Colon Cancer in her paternal uncle; Depression in her brother and mother; Diabetes in her father; High Blood Pressure in her father and paternal grandmother; High Cholesterol in her father and paternal grandmother; No Known Problems in her maternal grandfather, maternal grandmother, and paternal grandfather; Obesity in her father; Other in her brother and father; Stroke in her paternal grandmother. SOCIAL HISTORY:   reports that she has been smoking cigarettes. She started smoking about 18 years ago. She has a 8.50 pack-year smoking history. She has never used smokeless tobacco. She reports that she does not currently use alcohol. She reports current drug use. Frequency: 7.00 times per week. Drug: Marijuana [place]).     VITALS:  Vitals:    10/20/22 0653   BP: (!) 93/55   Pulse: 56   Resp: 16   Temp: 97.7 °F (36.5 °C)   TempSrc: Infrared   SpO2: 96%   Weight: 130 lb (59 kg)   Height: 5' 6\" (1.676 m)                                                                                                                               PHYSICAL EXAM:     Unchanged from Prior H&P  CONSTITUTIONAL:  Alert and oriented, no acute distress  HEAD: normocephalic, atraumatic  EYES: Pupils equal and reactive to light, Extraocular muscles intact, sclera non icteric  ENT: Mucus membranes moist, No otorrhea, no rhinorrhea  NECK:  supple, symmetrical, trachea midline   LUNGS:  Good air movement bilaterally, unlabored respirations, no wheezes or rhonchi  CARDIOVASCULAR: Regular rate and rhythm, no murmurs rubs or gallops  ABDOMEN: soft, non tender, non distended, no rebound or guarding, no hernias, no hepatomegaly, no splenomegly  MUSCULOSKELETAL:  Equal strength bilaterally, normal muscle tone  SKIN: No abscess or rash  NEUROLOGIC:  Cranial nerves 2-12 grossly intact, no focal deficits  PSYCH: affect appropriate  Pelvic Exam: deferred to OR      LAB RESULTS:  Hospital Outpatient Visit on 10/12/2022   Component Date Value Ref Range Status    Surgical Pathology Report 10/12/2022    Final                    Value:-- Diagnosis --    Endometrial biopsy:    -  Inactive endometrium without atypia       Klarissa Nicholas M.D.  **Electronically Signed Out**         rdd/10/14/2022       Clinical Information  Pre-op Diagnosis:  MENORRHAGIA WITH IRREGULAR CYCLE, DYSMENORRHEA,  PELVIC PAIN    Operative Findings:  ENDOMETRIAL BX    Source of Specimen  A: ENDOMETRIAL BX    Gross Description  \"FREDDY TIMBO, ENDOMETRIAL BX\" Pink-red fragments, 2.8 x 2.3 x  0.2 cm in aggregate. Entirely 1cs. mm tm      Microscopic Description  Biopsy consists of blood clot with intermixed fragments of inactive  endometrium. There is patchy stromal pseudodecidualization suggesting  exogenous hormone effect. There is no atypia or malignancy in the  sample. SURGICAL PATHOLOGY CONSULTATION       Patient Name: Jaden Montero [facility] Rec: [id number]  Path Number: [id number]    [address] [place] [address].   [place], [address]  [phone]                            Fax: [phone]     Hospital Outpatient Visit on 10/07/2022   Component Date Value Ref Range Status    WBC 10/07/2022 5.8  3.5 - 11.0 k/uL Final    RBC 10/07/2022 4.45  4.0 - 5.2 m/uL Final    Hemoglobin 10/07/2022 12.9  12.0 - 16.0 g/dL Final    Hematocrit 10/07/2022 39.0  36 - 46 % Final    MCV 10/07/2022 87.5  80 - 100 fL Final    MCH 10/07/2022 28.9  26 - 34 pg Final    MCHC 10/07/2022 33.1  31 - 37 g/dL Final    RDW 10/07/2022 13.5  11.5 - 14.9 % Final    Platelets 68/34/0845 192  150 - 450 k/uL Final    MPV 10/07/2022 8.9  6.0 - 12.0 fL Final    Seg Neutrophils 10/07/2022 63  36 - 66 % Final    Lymphocytes 10/07/2022 28  24 - 44 % Final    Monocytes 10/07/2022 6  1 - 7 % Final    Eosinophils % 10/07/2022 2  0 - 4 % Final    Basophils 10/07/2022 1  0 - 2 % Final    Segs Absolute 10/07/2022 3.60  1.3 - 9.1 k/uL Final    Absolute Lymph # 10/07/2022 1.60  1.0 - 4.8 k/uL Final    Absolute Mono # 10/07/2022 0.40  0.1 - 1.3 k/uL Final    Absolute Eos # 10/07/2022 0.10  0.0 - 0.4 k/uL Final    Basophils Absolute 10/07/2022 0.00  0.0 - 0.2 k/uL Final    Glucose 10/07/2022 95  70 - 99 mg/dL Final    BUN 10/07/2022 6  6 - 20 mg/dL Final    Creatinine 10/07/2022 0.63  0.50 - 0.90 mg/dL Final    Est, Glom Filt Rate 10/07/2022 >60  >60 mL/min/1.73m2 Final    Comment:       Effective Oct 3, 2022        These results are not intended for use in patients <25years of age. eGFR results are calculated without a race factor using the 2021 CKD-EPI equation. Careful clinical correlation is recommended, particularly when comparing to results   calculated using previous equations. The CKD-EPI equation is less accurate in patients with extremes of muscle mass, extra-renal   metabolism of creatine, excessive creatine ingestion, or following therapy that affects   renal tubular secretion. Calcium 10/07/2022 9.2  8.6 - 10.4 mg/dL Final    Sodium 10/07/2022 144  135 - 144 mmol/L Final    Potassium 10/07/2022 4.0  3.7 - 5.3 mmol/L Final    Chloride 10/07/2022 109 (A)  98 - 107 mmol/L Final    CO2 10/07/2022 26  20 - 31 mmol/L Final    Anion Gap 10/07/2022 9  9 - 17 mmol/L Final    Alkaline Phosphatase 10/07/2022 49  35 - 104 U/L Final    ALT 10/07/2022 10  5 - 33 U/L Final    AST 10/07/2022 20  <32 U/L Final    Total Bilirubin 10/07/2022 0.5  0.3 - 1.2 mg/dL Final    Total Protein 10/07/2022 6.7  6.4 - 8.3 g/dL Final    Albumin 10/07/2022 4.5  3.5 - 5.2 g/dL Final    Protime 10/07/2022 13.8  11.8 - 14.6 sec Final    INR 10/07/2022 1.1   Final    Comment:       Non-therapeutic Range:     INR = 0.9-1.2  Therapeutic Range:    Moderate Anticoagulant Intensity:     INR = 2.0-3.0   High Anticoagulant Intensity:     INR = 2.5-3.5            PTT 10/07/2022 32.8  24.0 - 36.0 sec Final    Comment:       IV Heparin Therapy Range:      62.0-94.0            Color, UA 10/07/2022 Yellow  Yellow Final    Turbidity UA 10/07/2022 Clear  Clear Final    Glucose, Ur 10/07/2022 NEGATIVE  NEGATIVE Final    Bilirubin Urine 10/07/2022 NEGATIVE  NEGATIVE Final    Ketones, Urine 10/07/2022 NEGATIVE  NEGATIVE Final    Specific Gravity, UA 10/07/2022 1.006  1.000 - 1.030 Final    Urine Hgb 10/07/2022 NEGATIVE  NEGATIVE Final    pH, UA 10/07/2022 7.0  5.0 - 8.0 Final    Protein, UA 10/07/2022 NEGATIVE  NEGATIVE Final    Urobilinogen, Urine 10/07/2022 Normal  Normal Final    Nitrite, Urine 10/07/2022 NEGATIVE  NEGATIVE Final    Leukocyte Esterase, Urine 10/07/2022 NEGATIVE  NEGATIVE Final    Urinalysis Comments 10/07/2022 Microscopic exam not performed based on chemical results unless requested in original order. Final    hCG Quant 10/07/2022 <1  <5 mIU/mL Final    Comment:    Non-preg premeno   <=5  Postmeno           <=8  Male               <=3  If HCG results do not concur with clinical observations, additional testing to confirm   results is recommended. Hemoglobin A1C 10/07/2022 5.2  4.0 - 6.0 % Final    Estimated Avg Glucose 10/07/2022 103  mg/dL Final    Comment: The ADA and AACC recommend providing the estimated average glucose result to permit better   patient understanding of their HBA1c result. TSH 10/07/2022 0.95  0.30 - 5.00 uIU/mL Final     Diagnostics:              DIAGNOSIS & PLAN:  1. Abnormal Uterine Bleeding    - Proceed with planned procedure: Hysteroscopy, myosure, novasure endometrial ablation, IUD retrieval, and laparoscopic medically necessary bilateral salpingectomy   - Consent signed, on chart. - The patient is ready for transport to the operative suite. Counseling: The patient was counseled on all options both medical and surgical, conservative as well as definitive. She has elected to proceed with the procedure as stated above. The patient was counseled on the procedure. Risks and complications were reviewed in detail. The patients orders, labs, consents have been completed. The history and physical as well as all supporting surgical documentation will be forwarded to the pre-operative holding area. The patient is aware that this procedure may not alleviate her symptoms. That there may be a necessity for a second surgery and that there may be an incomplete removal of abnormal tissue.     Farheen Sauer MD  Ob/Gyn Resident   [address] [place] [address]  10/19/2022, 10:17 PM

## 2022-10-20 NOTE — DISCHARGE INSTRUCTIONS
DISCHARGE INSTRUCTIONS FOR GENERAL ANESTHESIA    In order to continue your care at home, please follow the instructions below. Anesthesia - Do not drink any alcoholic beverages or make any legal or important decisions for 24 hours. If your surgery was on an extremity or abdomen, do not drive or operate any machinery until your surgeon approves, otherwise do not drive or operate any machinery for 24 hours or as restricted by your surgeon. Pain Medications - Please do not drive, operate machinery, or drink alcoholic beverages while taking any prescribed pain medication. Diet -  Start out eating lightly (broth, soup, crackers, toast, etc.) advancing as tolerated to your usual diet. Try to avoid spicy and greasy/fatty foods for 24 hours. Drink plenty of fluids after surgery, unless you are on a fluid restriction. Avoid milk/milk product for several hours. Call your surgeon for the following: You have pain that does not get better after you take pain medicine. For an oral temperature (by mouth) is 101 degrees or higher, chills, or excessive sweating. You have increasing and progressive bleeding or drainage from surgery site. Signs of an infection:  increased swelling, redness, warmth, or hardness around surgery area or yellow or green drainage. Persistent nausea or vomiting and cant keep fluids down. If you are unable to urinate within 8 hours of surgery. Redness or swelling at IV site. For any questions or concerns you may have.

## 2022-10-20 NOTE — ANESTHESIA POSTPROCEDURE EVALUATION
POST- ANESTHESIA EVALUATION       Pt Name: Betty Alvarez  MRN: [id number]  YOB: 1985  Date of evaluation: 10/20/2022  Time:  2:24 PM      /67   Pulse 76   Temp 97.7 °F (36.5 °C)   Resp 14   Ht 5' 6\" (1.676 m)   Wt 130 lb (59 kg)   SpO2 98%   BMI 20.98 kg/m²      Consciousness Level  Awake  Cardiopulmonary Status  Stable  Pain Adequately Treated YES  Nausea / Vomiting  NO  Adequate Hydration  YES  Anesthesia Related Complications NONE      Electronically signed by Panda Thakkar MD on 10/20/2022 at 2:24 PM       Department of Anesthesiology  Postprocedure Note    Patient: Betty Alvarez  MRN: [id number]  YOB: 1985  Date of evaluation: 10/20/2022      Procedure Summary     Date: 10/20/22 Room / Location: [address] [place] [address] Loren Rojas 06 / [facility]: [facility]    Anesthesia Start: 9607 Anesthesia Stop: 1115    Procedures:       HYSTEROSCOPY [address] (Uterus)      IUD RETRIEVAL (Uterus)      LAPAROSCOPIC MEDICALLY NECESSARY SALPINGECTOMY (Bilateral: Abdomen) Diagnosis:       Menorrhagia with regular cycle      Intrauterine contraceptive device threads lost, initial encounter      (LOST IUD, MENORRHAGIA)    Surgeons: Marisol Dasilva DO Responsible Provider: Asenath Rubinstein, MD    Anesthesia Type: general ASA Status: 2          Anesthesia Type: No value filed.     Zach Phase I: Zach Score: 9    Zach Phase II: Zach Score: 10      Anesthesia Post Evaluation

## 2022-10-20 NOTE — ANESTHESIA PRE PROCEDURE
Department of Anesthesiology  Preprocedure Note       Name:  Slim Allison   Age:  44 y.o.  :  1985                                          MRN:  [id number]         Date:  10/20/2022      Surgeon: Reinaldo Whitehead):  Lesli Bliss DO    Procedure: Procedure(s): HYSTEROSCOPY [place] ENDOMETRIAL ABLATION  IUD RETRIEVAL  LAPAROSCOPIC MEDICALLY NECESSARY SALPINGECTOMY    Medications prior to admission:   Prior to Admission medications    Medication Sig Start Date End Date Taking? Authorizing Provider   ibuprofen (ADVIL;MOTRIN) 600 MG tablet Take 1 tablet by mouth every 6 hours as needed for Pain 10/20/22  Yes Bolivar Pearson MD   acetaminophen (TYLENOL) 500 MG tablet Take 2 tablets by mouth every 6 hours as needed for Pain 10/20/22  Yes Bolivar Pearson MD   ondansetron [facility]) 4 MG tablet Take 1 tablet by mouth every 8 hours as needed for Nausea or Vomiting 10/20/22  Yes Bolivar Pearson MD   sennosides-docusate sodium (SENOKOT-S) 8.6-50 MG tablet Take 1 tablet by mouth daily 10/20/22  Yes Bolivar Pearson MD   simethicone (MYLICON) 80 MG chewable tablet Take 1 tablet by mouth 4 times daily as needed for Flatulence 10/20/22  Yes Bolivar Pearson MD   oxyCODONE (ROXICODONE) 5 MG immediate release tablet Take 1 tablet by mouth every 6 hours as needed for Pain for up to 10 doses. Intended supply: 5 days.  Take lowest dose possible to manage pain 10/20/22 10/28/22 Yes Bolivar Pearson MD   omeprazole ([address] [place] [address]) 20 MG delayed release capsule TAKE ONE CAPSULE BY MOUTH DAILY  Patient not taking: Reported on 10/7/2022 2/22/22   Del Herrera MD   topiramate (TOPAMAX) 25 MG tablet 50mg AM, 25mg PM  Patient not taking: Reported on 2022   Amelia Alvarenga MD   QUEtiapine (SEROQUEL) 25 MG tablet 12.5mg to 25mg QHS PRN  Patient not taking: Reported on 2022   Amelia Alvarenga MD   cetirizine (ZYRTEC) 10 MG tablet  21   Historical Provider, MD   EPINEPHrine (EPIPEN) 0.3 MG/0.3ML SOAJ injection Inject 0.3 mg into the muscle as needed Use as directed for allergic reaction    Historical Provider, MD       Current medications:    Current Facility-Administered Medications   Medication Dose Route Frequency Provider Last Rate Last Admin    lidocaine PF 1 % injection 1 mL  1 mL IntraDERmal Once PRN Christ Burnett MD        lactated ringers infusion   IntraVENous Continuous Christ Burnett  mL/hr at 10/20/22 0703 New Bag at 10/20/22 0703    sodium chloride flush 0.9 % injection 5-40 mL  5-40 mL IntraVENous 2 times per day Christ Burnett MD        sodium chloride flush 0.9 % injection 5-40 mL  5-40 mL IntraVENous PRN Scarboroughmel Jeffries MD        0.9 % sodium chloride infusion   IntraVENous PRN Christ Burnett MD        scopolamine (TRANSDERM-SCOP) transdermal patch 1 patch  1 patch TransDERmal Once Sania Guillen MD        ceFAZolin (ANCEF) 2000 mg in dextrose 5 % 50 mL IVPB  2,000 mg IntraVENous Q8H Karime Cook MD           Allergies:     Allergies   Allergen Reactions    Latex Other (See Comments)     Redness and swelling    Banana Anaphylaxis     Lips swell    Banana Extract Allergy Skin Test Anaphylaxis    [place] (Diagnostic)     Avocado     Buckwheat     Coconut Fatty Acids     Coconut Oil     Kiwi Extract     [place] Flavor [Flavoring Agent]     Other      Green peas, lima beans     Papaya Derivatives     Peanut (Diagnostic)     Tape [place] Tape] Other (See Comments)     Plastic tape causes skin irritation    Wheat Bran     Maxalt [Rizatriptan Benzoate] Nausea And Vomiting       Problem List:    Patient Active Problem List   Diagnosis Code    Anxiety F41.9    Nasal congestion R09.81    HPV (human papilloma virus) anogenital infection A63.0    Psychophysiological insomnia F51.04    Fatigue R53.83    Chronic low back pain with bilateral sciatica M54.41, M54.42, G89.29    Spinal stenosis M48.00    Hemangioma of liver D18.03    Chronic bilateral low back pain with bilateral sciatica M54.42, M54.41, G89.29    Numbness and tingling of both lower extremities R20.0, R20.2    Fibromyalgia M79.7    Osteoarthritis M19.90    Major depressive disorder, recurrent, moderate (HCC) F33.1    Hypokalemia E87.6    Tobacco abuse Z72.0    Weight loss due to medication R63.4, T50.905A    Chronic diarrhea K52.9       Past Medical History:        Diagnosis Date    Abdominal pain 3/27/2015    Acid reflux     Allergic reaction to allergy skin test 09/2020    legumes, wheat, peanuts almonds    Anxiety     Fibromyalgia     Headache     migraines    HPV (human papilloma virus) anogenital infection     Insomnia     Lumbar radicular pain 9/7/2018    Movement disorder     spasms, B&B    Mycoplasma infection 9/4/2015    Neuropathy     left hands, feet    Numbness and tingling 12/19/2017    Post partum depression 3/27/2015    Rheumatic fever     Spinal stenosis     Wears glasses        Past Surgical History:        Procedure Laterality Date    CHOLECYSTECTOMY      COLONOSCOPY  08/31/2021    COLONOSCOPY N/A 8/31/2021    COLONOSCOPY WITH BIOPSY performed by Cl Echols MD at [facility]3] Dr. Zenobia Martell in [address] [place] [address]      removed some teeth at age 1   Alin Jessica INTRAUTERINE DEVICE INSERTION  2017    TONSILLECTOMY AND ADENOIDECTOMY Bilateral 01/07/2016    UPPER GASTROINTESTINAL ENDOSCOPY  08/31/2021      EGD BIOPSY    UPPER GASTROINTESTINAL ENDOSCOPY N/A 8/31/2021    EGD BIOPSY performed by Cl Echols MD at [address] [place] [address] Drive History:    Social History     Tobacco Use    Smoking status: Every Day     Packs/day: 0.50     Years: 17.00     Pack years: 8.50     Types: Cigarettes     Start date: 6/17/2004    Smokeless tobacco: Never    Tobacco comments:     is down to a half pack per day   Substance Use Topics    Alcohol use: Not Currently     Comment: Mixed drink maybe 2x a month                                Ready to quit: Not Answered  Counseling given: Not Answered  Tobacco comments: is down to a half pack per day      Vital Signs (Current):   Vitals:    10/20/22 0653   BP: (!) 93/55   Pulse: 56   Resp: 16   Temp: 97.7 °F (36.5 °C)   TempSrc: Infrared   SpO2: 96%   Weight: 130 lb (59 kg)   Height: 5' 6\" (1.676 m)                                              BP Readings from Last 3 Encounters:   10/20/22 (!) 93/55   10/12/22 118/74   10/07/22 (!) 110/57       NPO Status: Time of last liquid consumption: 2100                        Time of last solid consumption: 2100                        Date of last liquid consumption: 10/19/22                        Date of last solid food consumption: 10/19/22    BMI:   Wt Readings from Last 3 Encounters:   10/20/22 130 lb (59 kg)   10/12/22 132 lb 5 oz (60 kg)   10/07/22 130 lb (59 kg)     Body mass index is 20.98 kg/m². CBC:   Lab Results   Component Value Date/Time    WBC 5.8 10/07/2022 10:19 AM    RBC 4.45 10/07/2022 10:19 AM    RBC 4.67 03/14/2019 10:45 AM    HGB 12.9 10/07/2022 10:19 AM    HCT 39.0 10/07/2022 10:19 AM    MCV 87.5 10/07/2022 10:19 AM    RDW 13.5 10/07/2022 10:19 AM     10/07/2022 10:19 AM       CMP:   Lab Results   Component Value Date/Time     10/07/2022 10:19 AM    K 4.0 10/07/2022 10:19 AM     10/07/2022 10:19 AM    CO2 26 10/07/2022 10:19 AM    BUN 6 10/07/2022 10:19 AM    CREATININE 0.63 10/07/2022 10:19 AM    GFRAA >60 06/01/2020 03:43 PM    LABGLOM >60 10/07/2022 10:19 AM    GLUCOSE 95 10/07/2022 10:19 AM    GLUCOSE 84 07/11/2019 08:53 PM    PROT 6.7 10/07/2022 10:19 AM    PROT 6.8 03/14/2019 10:45 AM    PROT 6.4 03/14/2019 10:45 AM    CALCIUM 9.2 10/07/2022 10:19 AM    BILITOT 0.5 10/07/2022 10:19 AM    ALKPHOS 49 10/07/2022 10:19 AM    AST 20 10/07/2022 10:19 AM    ALT 10 10/07/2022 10:19 AM       POC Tests: No results for input(s): POCGLU, POCNA, POCK, POCCL, POCBUN, POCHEMO, POCHCT in the last 72 hours.     Coags:   Lab Results   Component Value Date/Time    PROTIME 13.8 10/07/2022 10:19 AM    INR 1.1 10/07/2022 10:19 AM    APTT 32.8 10/07/2022 10:19 AM       HCG (If Applicable):   Lab Results   Component Value Date    PREGTESTUR negative 09/02/2022    HCG NEGATIVE 10/20/2022    HCGQUANT <1 10/07/2022        ABGs: No results found for: PHART, PO2ART, GFQ0PVW, QYY6XJS, BEART, K7XDGAZZ     Type & Screen (If Applicable):  No results found for: LABABO, LABRH    Drug/Infectious Status (If Applicable):  No results found for: HIV, HEPCAB    COVID-19 Screening (If Applicable):   Lab Results   Component Value Date/Time    COVID19 Not Detected 08/27/2021 05:35 PM           Anesthesia Evaluation  Patient summary reviewed and Nursing notes reviewed no history of anesthetic complications:   Airway: Mallampati: II  TM distance: >3 FB   Neck ROM: full  Mouth opening: > = 3 FB   Dental: normal exam         Pulmonary:normal exam  breath sounds clear to auscultation  (+) current smoker (tobacco and marijuana)          Patient did not smoke on day of surgery. Cardiovascular:Negative CV ROS  Exercise tolerance: good (>4 METS),           Rhythm: regular  Rate: normal                    Neuro/Psych:   (+) neuromuscular disease:, headaches: migraine headaches, psychiatric history:            GI/Hepatic/Renal:   (+) GERD: well controlled,           Endo/Other: Negative Endo/Other ROS                    Abdominal:             Vascular: Other Findings:           Anesthesia Plan      general     ASA 2       Induction: intravenous. MIPS: Postoperative opioids intended and Prophylactic antiemetics administered. Anesthetic plan and risks discussed with patient. Plan discussed with CRNA.                     Petros Zarate MD   10/20/2022

## 2022-10-20 NOTE — OP NOTE
Operative Note  Department of Obstetrics and Gynecology  [facility]       Patient: Jairo Feldman   : 1985  MRN: [id number]       Acct: [de-identified]   PCP: SATHISH Lemon CNP  Date of Procedure: 10/20/22    Pre-operative Diagnosis: 44 y.o. female    Abnormal uterine bleeding   Spinal stenosis   Liver Hemangioma   Fibromyalgia   Osteoarthritis   Depression   Anxiety   Tobacco Use   BMI 20     Post-operative Diagnosis: same as above    Procedure: Hysteroscopy, Novasure endometrial ablation, IUD removal, and laparoscopic medically necessary bilateral salpingectomy    Surgeon: Dr. Purnima Palacios  Assistants: Edel Velez DO, PGY3; Rizwana Reyna MD, PGY1    Anesthesia: general    Indications:   Syed Khalil 44 y.o. female, [id number] presents with abnormal uterine bleeding. Patient has failed conservative management and desires surgical management at this time. Patient currently has an IUD in place which is due for removal with one failed outpatient attempt of IUD removal. Patients consents to both IUD retrieval and a medically necessary bilateral salpingectomy prior to endometrial ablation. EMB benign on 10/12/22. US on 22 reveals uterus measuring 9.7x6. 3x4.9 cm with IUD in place with a simple-appearing left ovarian cyst. Patient has a prior surgical history of laparoscopic cholecystectomy. Procedure Details: The patient was seen in the pre-op room. The risks, benefits, complications, treatment options, and expected outcomes were discussed with the patient. The patient concurred with the proposed plan, giving informed consent. The patient was taken to the Operating Room and identified as Jairo Feldman and the procedure was verified. A Time Out was held and the above information confirmed. The patient received 2g Ancef preoperatively. After administration of adequate general  anesthesia.  She was placed in the dorsolithotomy position with [employer] stirrups and prepped and draped in the usual sterile fashion. The bladder was emptied. Examination under anesthesia revealed findings as noted below. Attention was then turned to the abdominal portion of the case. All trocar sites were injected pre-emptively with 0.5% Marcaine. A 5mm skin incision was made superior to the umbilicus and a Veress needle was placed through this incision while tenting up the anterior abdominal wall. Saline bubble drop test was undergone and passed and gas was noted to flow under low pressures. Once pneumoperitoneum was obtained, the Veress needle was removed and the 5mm camera trocar was placed through this incision while tenting up the anterior abdominal wall. Intra-abdominal placement was confirmed with the laparoscope and the underlying structures visualized were without trauma. Brief survey of the pelvis noted no intraabdominal adhesions. Two 5 mm ports were then placed, one in the RLQ and the other in the LLQ under direct visualization and without complication . The patient was placed in Trendelenburg. The left fallopian tube was grasped and using the Ligasure, the mesosalpinx was coagulated and cut. The fallopian tube was cauterized and cut at the cornual end in order to perform the salpingectomy. The pedicle was double burned at the uterus to ensure hemostasis. The same procedure was performed on the right and once each salpinx was , they were passed off for pathology evaluation. Attention was then turned to the vaginal portion of the case. A weighted speculum was inserted into the posterior vaginal fornix. The anterior cervical lip was grasped with a Cornelius tenaculum. The uterine sound was used to measure the cervix and uterine cavity, measuring 4.5cm and 5 cm respectively. The cervix was then dilated with hegar dilators to size 7. Hysteroscopy was carried out revealing a normal endocervical and endometrial cavity.      NovaSure endometrial ablation procedure was then performed, the device was introduced into the uterus. The uterine cavity length was entered on the instrument panel as 5.0 cm. The array was deployed, and the width was registered as 4.5 cm which was entered into the instrument panel also. The cavity assessment was successfully completed followed by initiation of the ablation cycle which was completed without difficulty in 1 minutes and 1 seconds at a power of 124 presley. The novasure device was then removed. Repeat hysteroscopy showed adequate ablation of the endometrium. All the vaginal instruments were removed and hemostasis at the site of the tenaculum was noted. Instrument, sponge, and needle counts were correct at the conclusion of the case. SCDs for DVT prophylaxis remain in place for the post operative period. Dr. Yani Pickering was present for the entire operation. Findings:  Normal external genitalia without lesions, normal vaginal mucosa. Multiparous cervix, anteverted mobile uterus sounding to 9 cm. Normal appearing endocervical and endometrial cavities without polyps or fibroids, patent tubal ostia bilaterally. Normal appearing abdominal cavity without adhesions, normal appearing uterus, bilateral ovaries and fallopian tubes.  Adequate post-ablative changes of the uterus  Estimated Blood Loss: 10 ml  Drains:  None  Total IV Fluids: 800 ml  Total Fluid deficit: 370 ml  Urine output: 200 ml clear urine   Specimens:  Bilateral fallopian tubes; Mirena IUD   Instrument and Sponge Count: Correct  Complications: none  Condition: stable, transfer to post anesthesia recovery      Perry Bowens DO  Ob/Gyn Resident  10/20/2022, 11:05 AM            Date: 10/21/2022  Time: 1:37 PM    Patient Name: Vamshi Miranda  Patient : 1985  Room/Bed: [facility] OR Pool/NONE  Admission Date/Time: 10/20/2022  6:23 AM  MRN #: [id number]  [facility] #: [id number]      Attending Attestation:   I was present and scrubbed for the entire procedure.     Attending Name:  Prabha Cruz DO

## 2022-10-21 LAB — SURGICAL PATHOLOGY REPORT: NORMAL

## 2022-11-16 ENCOUNTER — OFFICE VISIT (OUTPATIENT)
Dept: OBGYN CLINIC | Age: 37
End: 2022-11-16

## 2022-11-16 VITALS — SYSTOLIC BLOOD PRESSURE: 110 MMHG | DIASTOLIC BLOOD PRESSURE: 72 MMHG

## 2022-11-16 DIAGNOSIS — Z98.890 S/P ENDOMETRIAL ABLATION: Primary | ICD-10-CM

## 2022-11-16 PROCEDURE — 99024 POSTOP FOLLOW-UP VISIT: CPT | Performed by: OBSTETRICS & GYNECOLOGY

## 2022-11-16 NOTE — PROGRESS NOTES
Michele Yap Remi  2022  12:44 PM    Michele Ruiznachos  Procedure:    Diagnosis Orders   1. S/p Hscope, Novasure endometrial ablation, Laparoscopic bilateral salpingectomy 10/20/22          Lisa Velarde is a 44 y.o. female     The patient was seen, she denies any complaints. She denied any shortness of breath, chest pain or dizziness. She denied any nausea, vomiting, or diarrhea. There is no fever, chills, or rigors. The patient denies any vaginal bleeding, discharge or odor. All of her pre-operative complaints are now resolved and she will notify us if bleeding increases or she has any concerns. Blood pressure 110/72, not currently breastfeeding. Abdominal Exam: soft non-tender. Good bowel sounds. No guarding, rebound or rigidity. No costal vertebral angle tenderness bilateral. No hernias    Incision: healing well, no drainage, no erythema, well approximated    Extremities: No edema or calf pain noted bilaterally. Pelvic Exam:Exam deferred. Results for orders placed or performed during the hospital encounter of 10/20/22   SURGICAL PATHOLOGY REPORT   Result Value Ref Range    Surgical Pathology Report       -- Diagnosis --  A. Fallopian tubes, bilateral salpingectomy:  Two fimbriated fallopian tubes with vascular congestion and paratubal  simple cysts. B. Intrauterine device, removal:  IUD, gross evaluation only. Lolita Mina  **Electronically Signed Out**         /10/21/2022       Clinical Information  Pre-op Diagnosis:  LOST IUD, MENORRHAGIA   Operative Findings:  LEFT AND RIGHT FALLOPIAN TUBES; IUD  PATHOLOGY  FRESH PER PHYSICIAN  Operation Performed:  HYSTEROSCOPY NOVASURE ENDOMETRIAL ABLATION, IUD  RETRIEVAL, LAPAROSCOPIC MEDICALLY NECESSARY SALPINGECTOMY    Source of Specimen  A: LEFT AND RIGHT FALLOPIAN TUBES  B: IUD-PATHOLOGY FRESH PER PHYSICIAN    Gross Description  A.   \"FREDDY BUMCROTS, LEFT AND RIGHT FALLOPIAN TUBES\" Two  unoriented diarrhea     Weight loss due to medication 04/24/2020    Tobacco abuse 12/04/2018    Fibromyalgia 07/06/2018    Osteoarthritis 07/06/2018     Early onset per dr Jeffrey Olmedo rheum [facility]       Major depressive disorder, recurrent, moderate ([facility] 07/06/2018    Hypokalemia 07/06/2018    Numbness and tingling of both lower extremities 04/24/2018    Chronic bilateral low back pain with bilateral sciatica 12/19/2017    Hemangioma of liver 10/24/2017    Spinal stenosis     Chronic low back pain with bilateral sciatica 05/05/2017    Psychophysiological insomnia 03/30/2017    Fatigue 03/30/2017    HPV (human papilloma virus) anogenital infection     Nasal congestion 03/27/2015    Anxiety           POD# 3 weeks   Procedure: see above   Stable   Pathology reviewed and found to be benign. Yes    Plan:   Return if symptoms worsen or fail to improve, for annual.  Restrictions lifted  Call for malodorous discharge or change in amount/color.

## 2023-03-17 ENCOUNTER — OFFICE VISIT (OUTPATIENT)
Dept: FAMILY MEDICINE CLINIC | Age: 38
End: 2023-03-17

## 2023-03-17 VITALS
SYSTOLIC BLOOD PRESSURE: 94 MMHG | OXYGEN SATURATION: 100 % | HEIGHT: 66 IN | TEMPERATURE: 97.7 F | DIASTOLIC BLOOD PRESSURE: 60 MMHG | BODY MASS INDEX: 21.69 KG/M2 | WEIGHT: 135 LBS | RESPIRATION RATE: 12 BRPM | HEART RATE: 95 BPM

## 2023-03-17 DIAGNOSIS — M54.40 BACK PAIN OF LUMBAR REGION WITH SCIATICA: Primary | ICD-10-CM

## 2023-03-17 DIAGNOSIS — Z09 HOSPITAL DISCHARGE FOLLOW-UP: ICD-10-CM

## 2023-03-17 RX ORDER — DULOXETIN HYDROCHLORIDE 30 MG/1
30 CAPSULE, DELAYED RELEASE ORAL DAILY
Qty: 30 CAPSULE | Refills: 1 | Status: SHIPPED | OUTPATIENT
Start: 2023-03-17 | End: 2023-05-16

## 2023-03-17 SDOH — ECONOMIC STABILITY: FOOD INSECURITY: WITHIN THE PAST 12 MONTHS, YOU WORRIED THAT YOUR FOOD WOULD RUN OUT BEFORE YOU GOT MONEY TO BUY MORE.: NEVER TRUE

## 2023-03-17 SDOH — ECONOMIC STABILITY: HOUSING INSECURITY
IN THE LAST 12 MONTHS, WAS THERE A TIME WHEN YOU DID NOT HAVE A STEADY PLACE TO SLEEP OR SLEPT IN A SHELTER (INCLUDING NOW)?: NO

## 2023-03-17 SDOH — ECONOMIC STABILITY: INCOME INSECURITY: HOW HARD IS IT FOR YOU TO PAY FOR THE VERY BASICS LIKE FOOD, HOUSING, MEDICAL CARE, AND HEATING?: NOT VERY HARD

## 2023-03-17 SDOH — ECONOMIC STABILITY: FOOD INSECURITY: WITHIN THE PAST 12 MONTHS, THE FOOD YOU BOUGHT JUST DIDN'T LAST AND YOU DIDN'T HAVE MONEY TO GET MORE.: NEVER TRUE

## 2023-03-17 ASSESSMENT — PATIENT HEALTH QUESTIONNAIRE - PHQ9
8. MOVING OR SPEAKING SO SLOWLY THAT OTHER PEOPLE COULD HAVE NOTICED. OR THE OPPOSITE, BEING SO FIGETY OR RESTLESS THAT YOU HAVE BEEN MOVING AROUND A LOT MORE THAN USUAL: 0
SUM OF ALL RESPONSES TO PHQ QUESTIONS 1-9: 8
9. THOUGHTS THAT YOU WOULD BE BETTER OFF DEAD, OR OF HURTING YOURSELF: 0
6. FEELING BAD ABOUT YOURSELF - OR THAT YOU ARE A FAILURE OR HAVE LET YOURSELF OR YOUR FAMILY DOWN: 1
7. TROUBLE CONCENTRATING ON THINGS, SUCH AS READING THE NEWSPAPER OR WATCHING TELEVISION: 0
3. TROUBLE FALLING OR STAYING ASLEEP: 0
10. IF YOU CHECKED OFF ANY PROBLEMS, HOW DIFFICULT HAVE THESE PROBLEMS MADE IT FOR YOU TO DO YOUR WORK, TAKE CARE OF THINGS AT HOME, OR GET ALONG WITH OTHER PEOPLE: 1
1. LITTLE INTEREST OR PLEASURE IN DOING THINGS: 1
5. POOR APPETITE OR OVEREATING: 3
4. FEELING TIRED OR HAVING LITTLE ENERGY: 2
SUM OF ALL RESPONSES TO PHQ QUESTIONS 1-9: 8
SUM OF ALL RESPONSES TO PHQ QUESTIONS 1-9: 8
2. FEELING DOWN, DEPRESSED OR HOPELESS: 1
SUM OF ALL RESPONSES TO PHQ9 QUESTIONS 1 & 2: 2
SUM OF ALL RESPONSES TO PHQ QUESTIONS 1-9: 8

## 2023-03-17 NOTE — PATIENT INSTRUCTIONS
New Updates for My [facility] JUAN    Thank you for choosing US to give you the best care! [facility] ([facility]) is always trying to think of new ways to help their patients. We are asking all patients to try out the new digital registration that is now available through the new [facility] JUAN, feel free to download today. Via the juan you're now able to update your personal and registration information prior to your upcoming appointment. This will save you time once you arrive at the office to check-in, not to mention your information remains safe!! Many other perks come from signing up for an account, such as:  Requesting refills  Scheduling an appointment  Completing an E-Visit  Sending a message to the office/provider  Having access to your medication list  Paying your bill/copay prior to your appointment  Scheduling your yearly mammogram  Review your test results    If you are not familiar with the [facility] JUAN, please ask one of us and we will be happy to answer any questions or help you set-up your account.

## 2023-03-17 NOTE — PROGRESS NOTES
Post-Discharge Transitional Care Management Progress Note      Anival Khalil   YOB: 1985    Date of Office Visit:  3/17/2023  Date of Hospital Admission: 03/13/2023  Date of Hospital Discharge: 03/13/2023    Care management risk score Rising risk (score 2-5) and Complex Care (Scores >=6): No Risk Score On File     Non face to face  following discharge, date last encounter closed (first attempt may have been earlier): *No documented post hospital discharge outreach found in the last 14 days *No documented post hospital discharge outreach found in the last 14 days    Call initiated 2 business days of discharge: *No response recorded in the last 14 days    ASSESSMENT/PLAN:   Back pain of lumbar region with sciatica  -     MRI LUMBAR SPINE [address]; Future  -     Yeimi Goodman MD, Pain Management, [place]  -     DULoxetine (CYMBALTA) 30 MG extended release capsule; Take 1 capsule by mouth daily, Disp-30 capsule, R-1Normal  Hospital discharge follow-up  -     RI DISCHARGE MEDS RECONCILED W/ CURRENT OUTPATIENT MED LIST    Medical Decision Making: low complexity  Return if symptoms worsen or fail to improve. Subjective:   HPI:  Follow up of Hospital problems/diagnosis(es): Acute on chronic lumbar pain with right paraspinal spasm, and right sided sciatica. Inpatient course: Discharge summary reviewed- see chart. Interval history/Current status: Still having some nausea. Low appetite. Back pain in the lumbar area radiating to hip and down the leg. Numbness to the right toes intermittent. Lidocaine patch isn't doing much for the pain. Muscle relaxer isn't helping either. Has done PT for her back 4x in the past. She continues to do the exercises daily, but it hasn't provided much relief for this flare. Has  been using her TENS unit, lidocaine, muscle relaxers and stretches with no benefit.  Start Cymblata 30 mg daily for pain management as the patient does not want anything sedating, MRI and pain management referral.      Patient Active Problem List   Diagnosis    Anxiety    Nasal congestion    HPV (human papilloma virus) anogenital infection    Psychophysiological insomnia    Fatigue    Chronic low back pain with bilateral sciatica    Spinal stenosis    Hemangioma of liver    Chronic bilateral low back pain with bilateral sciatica    Numbness and tingling of both lower extremities    Fibromyalgia    Osteoarthritis    Major depressive disorder, recurrent, moderate (HCC)    Hypokalemia    Tobacco abuse    Weight loss due to medication    Chronic diarrhea    S/p Hscope, Novasure endometrial ablation, Laparoscopic bilateral salpingectomy 10/20/22    Adnexal mass    Cyst of fallopian tube    Menorrhagia with regular cycle    Abnormal uterine bleeding (AUB)    Back pain of lumbar region with sciatica       Medications listed as ordered at the time of discharge from hospital     Medication List            Accurate as of March 17, 2023  9:39 AM. If you have any questions, ask your nurse or doctor.                 START taking these medications      DULoxetine 30 MG extended release capsule  Commonly known as: CYMBALTA  Take 1 capsule by mouth daily  Started by: SATHISH Reich CNP            CONTINUE taking these medications      cetirizine 10 MG tablet  Commonly known as: ZYRTEC     EPINEPHrine 0.3 MG/0.3ML Soaj injection  Commonly known as: EPIPEN     ondansetron 4 MG tablet  Commonly known as: ZOFRAN  Take 1 tablet by mouth every 8 hours as needed for Nausea or Vomiting               Where to Get Your Medications        These medications were sent to [facility]  [id number], [address] [place] [address] [phone]  [address] [place] [address]      Phone: [phone]   DULoxetine 30 MG extended release capsule           Medications marked \"taking\" at this time  Outpatient Medications Marked as Taking for the 3/17/23 encounter (Office Visit) with Cathryne Litten SATHISH Kunz - CNP   Medication Sig Dispense Refill    DULoxetine (CYMBALTA) 30 MG extended release capsule Take 1 capsule by mouth daily 30 capsule 1    ondansetron (ZOFRAN) 4 MG tablet Take 1 tablet by mouth every 8 hours as needed for Nausea or Vomiting 30 tablet 1    cetirizine (ZYRTEC) 10 MG tablet       EPINEPHrine (EPIPEN) 0.3 MG/0.3ML SOAJ injection Inject 0.3 mg into the muscle as needed Use as directed for allergic reaction          Medications patient taking as of now reconciled against medications ordered at time of hospital discharge: Yes    A comprehensive review of systems was negative except for what was noted in the HPI.     Objective:    BP 94/60 (Site: Left Upper Arm, Position: Sitting, Cuff Size: Medium Adult)   Pulse 95   Temp 97.7 °F (36.5 °C) (Temporal)   Resp 12   Ht 5' 6\" (1.676 m)   Wt 135 lb (61.2 kg)   SpO2 100%   BMI 21.79 kg/m²   General Appearance: alert and oriented to person, place and time, well developed and well- nourished, in no acute distress  Skin: warm and dry, no rash or erythema  Head: normocephalic and atraumatic  Eyes: pupils equal, round, and reactive to light, extraocular eye movements intact, conjunctivae normal  ENT: tympanic membrane, external ear and ear canal normal bilaterally, nose without deformity, nasal mucosa and turbinates normal without polyps  Neck: supple and non-tender without mass, no thyromegaly or thyroid nodules, no cervical lymphadenopathy  Pulmonary/Chest: clear to auscultation bilaterally- no wheezes, rales or rhonchi, normal air movement, no respiratory distress  Cardiovascular: normal rate, regular rhythm, normal S1 and S2, no murmurs, rubs, clicks, or gallops, distal pulses intact, no carotid bruits  Abdomen: soft, non-tender, non-distended, normal bowel sounds, no masses or organomegaly  Extremities: no cyanosis, clubbing or edema  Musculoskeletal: normal range of motion, no joint swelling, deformity or tenderness  Neurologic: reflexes normal and symmetric, no cranial nerve deficit, gait, coordination and speech normal    An electronic signature was used to authenticate this note.   --SATHISH Shipley - CNP

## 2023-03-23 ENCOUNTER — TELEPHONE (OUTPATIENT)
Dept: FAMILY MEDICINE CLINIC | Age: 38
End: 2023-03-23

## 2023-03-23 ENCOUNTER — HOSPITAL ENCOUNTER (EMERGENCY)
Age: 38
Discharge: HOME | End: 2023-03-23
Attending: EMERGENCY MEDICINE
Payer: COMMERCIAL

## 2023-03-23 ENCOUNTER — APPOINTMENT (OUTPATIENT)
Dept: GENERAL RADIOLOGY | Age: 38
End: 2023-03-23
Payer: COMMERCIAL

## 2023-03-23 VITALS
WEIGHT: 125 LBS | RESPIRATION RATE: 18 BRPM | SYSTOLIC BLOOD PRESSURE: 106 MMHG | TEMPERATURE: 97.8 F | BODY MASS INDEX: 20.83 KG/M2 | HEART RATE: 73 BPM | OXYGEN SATURATION: 98 % | DIASTOLIC BLOOD PRESSURE: 90 MMHG | HEIGHT: 65 IN

## 2023-03-23 DIAGNOSIS — R07.9 CHEST PAIN, UNSPECIFIED TYPE: Primary | ICD-10-CM

## 2023-03-23 LAB
ABSOLUTE EOS #: 0.15 K/UL (ref 0–0.44)
ABSOLUTE IMMATURE GRANULOCYTE: <0.03 K/UL (ref 0–0.3)
ABSOLUTE LYMPH #: 1.79 K/UL (ref 1.1–3.7)
ABSOLUTE MONO #: 0.42 K/UL (ref 0.1–1.2)
ALBUMIN SERPL-MCNC: 4.3 G/DL (ref 3.5–5.2)
ALBUMIN/GLOBULIN RATIO: 1.9 (ref 1–2.5)
ALP SERPL-CCNC: 59 U/L (ref 35–104)
ALT SERPL-CCNC: 22 U/L (ref 5–33)
ANION GAP SERPL CALCULATED.3IONS-SCNC: 8 MMOL/L (ref 9–17)
AST SERPL-CCNC: 37 U/L
BASOPHILS # BLD: 1 % (ref 0–2)
BASOPHILS ABSOLUTE: 0.07 K/UL (ref 0–0.2)
BILIRUB SERPL-MCNC: 0.3 MG/DL (ref 0.3–1.2)
BUN SERPL-MCNC: 7 MG/DL (ref 6–20)
CALCIUM SERPL-MCNC: 8.8 MG/DL (ref 8.6–10.4)
CHLORIDE SERPL-SCNC: 101 MMOL/L (ref 98–107)
CO2 SERPL-SCNC: 26 MMOL/L (ref 20–31)
CREAT SERPL-MCNC: 0.61 MG/DL (ref 0.5–0.9)
D DIMER BLD IA.RAPID-MCNC: <0.27 MG/L FEU (ref 0–0.57)
EOSINOPHILS RELATIVE PERCENT: 2 % (ref 1–4)
GFR SERPL CREATININE-BSD FRML MDRD: >60 ML/MIN/1.73M2
GLUCOSE SERPL-MCNC: 94 MG/DL (ref 70–99)
HCG QUALITATIVE: NEGATIVE
HCT VFR BLD AUTO: 40.2 % (ref 36.3–47.1)
HGB BLD-MCNC: 12.8 G/DL (ref 11.9–15.1)
IMMATURE GRANULOCYTES: 0 %
LIPASE SERPL-CCNC: 33 U/L (ref 13–60)
LYMPHOCYTES # BLD: 27 % (ref 24–43)
MAGNESIUM SERPL-MCNC: 1.9 MG/DL (ref 1.6–2.6)
MCH RBC QN AUTO: 28.5 PG (ref 25.2–33.5)
MCHC RBC AUTO-ENTMCNC: 31.8 G/DL (ref 28.4–34.8)
MCV RBC AUTO: 89.5 FL (ref 82.6–102.9)
MONOCYTES # BLD: 6 % (ref 3–12)
NRBC AUTOMATED: 0 PER 100 WBC
PDW BLD-RTO: 12.8 % (ref 11.8–14.4)
PLATELET # BLD AUTO: 252 K/UL (ref 138–453)
PMV BLD AUTO: 10 FL (ref 8.1–13.5)
POTASSIUM SERPL-SCNC: 3.9 MMOL/L (ref 3.7–5.3)
PROT SERPL-MCNC: 6.6 G/DL (ref 6.4–8.3)
RBC # BLD: 4.49 M/UL (ref 3.95–5.11)
SEG NEUTROPHILS: 64 % (ref 36–65)
SEGMENTED NEUTROPHILS ABSOLUTE COUNT: 4.3 K/UL (ref 1.5–8.1)
SODIUM SERPL-SCNC: 135 MMOL/L (ref 135–144)
TROPONIN I SERPL DL<=0.01 NG/ML-MCNC: <6 NG/L (ref 0–14)
WBC # BLD AUTO: 6.7 K/UL (ref 3.5–11.3)

## 2023-03-23 PROCEDURE — 83690 ASSAY OF LIPASE: CPT

## 2023-03-23 PROCEDURE — 99285 EMERGENCY DEPT VISIT HI MDM: CPT

## 2023-03-23 PROCEDURE — 96374 THER/PROPH/DIAG INJ IV PUSH: CPT

## 2023-03-23 PROCEDURE — 6370000000 HC RX 637 (ALT 250 FOR IP): Performed by: STUDENT IN AN ORGANIZED HEALTH CARE EDUCATION/TRAINING PROGRAM

## 2023-03-23 PROCEDURE — 71046 X-RAY EXAM CHEST 2 VIEWS: CPT

## 2023-03-23 PROCEDURE — 2500000003 HC RX 250 WO HCPCS

## 2023-03-23 PROCEDURE — 85379 FIBRIN DEGRADATION QUANT: CPT

## 2023-03-23 PROCEDURE — 6360000002 HC RX W HCPCS: Performed by: STUDENT IN AN ORGANIZED HEALTH CARE EDUCATION/TRAINING PROGRAM

## 2023-03-23 PROCEDURE — 93005 ELECTROCARDIOGRAM TRACING: CPT | Performed by: STUDENT IN AN ORGANIZED HEALTH CARE EDUCATION/TRAINING PROGRAM

## 2023-03-23 PROCEDURE — 80053 COMPREHEN METABOLIC PANEL: CPT

## 2023-03-23 PROCEDURE — 84703 CHORIONIC GONADOTROPIN ASSAY: CPT

## 2023-03-23 PROCEDURE — 84484 ASSAY OF TROPONIN QUANT: CPT

## 2023-03-23 PROCEDURE — 85025 COMPLETE CBC W/AUTO DIFF WBC: CPT

## 2023-03-23 PROCEDURE — 96375 TX/PRO/DX INJ NEW DRUG ADDON: CPT

## 2023-03-23 PROCEDURE — 83735 ASSAY OF MAGNESIUM: CPT

## 2023-03-23 RX ORDER — ONDANSETRON 2 MG/ML
4 INJECTION INTRAMUSCULAR; INTRAVENOUS ONCE
Status: COMPLETED | OUTPATIENT
Start: 2023-03-23 | End: 2023-03-23

## 2023-03-23 RX ORDER — FAMOTIDINE 10 MG/ML
INJECTION, SOLUTION INTRAVENOUS
Status: COMPLETED
Start: 2023-03-23 | End: 2023-03-23

## 2023-03-23 RX ORDER — FAMOTIDINE 10 MG/ML
20 INJECTION, SOLUTION INTRAVENOUS ONCE
Status: COMPLETED | OUTPATIENT
Start: 2023-03-23 | End: 2023-03-23

## 2023-03-23 RX ORDER — ACETAMINOPHEN 500 MG
1000 TABLET ORAL ONCE
Status: COMPLETED | OUTPATIENT
Start: 2023-03-23 | End: 2023-03-23

## 2023-03-23 RX ADMIN — FAMOTIDINE 20 MG: 10 INJECTION INTRAVENOUS at 11:15

## 2023-03-23 RX ADMIN — ONDANSETRON 4 MG: 2 INJECTION INTRAMUSCULAR; INTRAVENOUS at 11:16

## 2023-03-23 RX ADMIN — ACETAMINOPHEN 1000 MG: 500 TABLET ORAL at 11:15

## 2023-03-23 RX ADMIN — FAMOTIDINE 20 MG: 10 INJECTION, SOLUTION INTRAVENOUS at 11:15

## 2023-03-23 ASSESSMENT — LIFESTYLE VARIABLES: HOW OFTEN DO YOU HAVE A DRINK CONTAINING ALCOHOL: NEVER

## 2023-03-23 ASSESSMENT — PAIN DESCRIPTION - PAIN TYPE: TYPE: ACUTE PAIN

## 2023-03-23 ASSESSMENT — ENCOUNTER SYMPTOMS
COUGH: 0
ABDOMINAL PAIN: 1
RHINORRHEA: 0
NAUSEA: 0
VOMITING: 0
BACK PAIN: 1
SHORTNESS OF BREATH: 0
DIARRHEA: 0

## 2023-03-23 ASSESSMENT — PAIN SCALES - GENERAL: PAINLEVEL_OUTOF10: 4

## 2023-03-23 ASSESSMENT — PAIN DESCRIPTION - DESCRIPTORS
DESCRIPTORS: ACHING
DESCRIPTORS: ACHING

## 2023-03-23 ASSESSMENT — PAIN DESCRIPTION - LOCATION
LOCATION: ABDOMEN;BACK
LOCATION: ABDOMEN

## 2023-03-23 ASSESSMENT — HEART SCORE: ECG: 1

## 2023-03-23 ASSESSMENT — PAIN - FUNCTIONAL ASSESSMENT: PAIN_FUNCTIONAL_ASSESSMENT: 0-10

## 2023-03-23 NOTE — ED NOTES
Additional labs obtained and sent  Pt sts she is doing ok at this time  Family at bedside  Awaiting results of testing and further orders     Ryan Mckeon RN  03/23/23 [id number]

## 2023-03-23 NOTE — DISCHARGE INSTRUCTIONS
Your heart work up today was negative. For pain:  Tylenol can be taken every 6 hours. Ibuprofen can be taken every 6 hours. It is recommended you alternate the two every three hours. Example pain medication schedule:  - 9am: Tylenol (500-1000mg)  - 12 pm/noon: Ibuprofen (400-600mg)  - 3pm: Tylenol  - 6pm: Ibuprofen    Maximum dose of tylenol in a 24 hour period is 4000mg. PLEASE RETURN TO THE EMERGENCY DEPARTMENT IMMEDIATELY for worsening symptoms of increasing pain, shortness of breath, feeling of your heart fluttering or racing, swelling to your feet, unable to lay flat, or if you develop any concerning symptoms such as: high fever not relieved by acetaminophen (Tylenol) and/or ibuprofen (Motrin / Advil), chills, persistent nausea and/or vomiting, loss of consciousness, numbness, weakness or tingling in the arms or legs or change in color of the extremities, changes in mental status, persistent headache, blurry vision, loss of bladder / bowel control, unable to follow up with your physician, or other any other care or concern.

## 2023-03-23 NOTE — Clinical Note
Sharri Corbin was seen and treated in our emergency department on 3/23/2023. She may return to work on 03/24/2023. If you have any questions or concerns, please don't hesitate to call.       Cyndee Martínez MD

## 2023-03-23 NOTE — ED PROVIDER NOTES
[facility]  ED  Emergency Department Encounter  Emergency Medicine Resident     Pt Name:Alissa Nash  MRN: [id number]  Birthdate 1985  Date of evaluation: 3/23/23  PCP:  SATHISH Umana CNP  Note Started: 10:45 AM EDT    CHIEF COMPLAINT       Chief Complaint   Patient presents with    Chest Pain     \"Went into  my arms to my hands\"    Abdominal Pain     HISTORY OF PRESENT ILLNESS  (Location/Symptom, Timing/Onset, Context/Setting, Quality, Duration, Modifying Factors, Severity.)      Maranda Coombs is a 44 y.o. female who presents with acute onset chest pain which occurred while driving into work today. The episode she described as a sharp pain in the center of her chest also in the epigastric region. She states that the chest pain radiated into bilateral arms with a numbness and tingling in the hands. She also felt like her abdomen became bloated during this episode. Did have some nausea associated but this is since passed. No vomiting. No recent sick symptoms. No recent travel. Patient did have a surgery back in October 2022 for a tube removal salpingectomy and ablation. No other recent surgery. Patient is not on any hormone or birth control. PAST MEDICAL / SURGICAL / SOCIAL / FAMILY HISTORY      has a past medical history of Abdominal pain, Acid reflux, Allergic reaction to allergy skin test, Anxiety, Fibromyalgia, Headache, HPV (human papilloma virus) anogenital infection, Insomnia, Lumbar radicular pain, Movement disorder, Mycoplasma infection, Neuropathy, Numbness and tingling, Post partum depression, Rheumatic fever, S/p Hscope, Novasure endometrial ablation, Laparoscopic bilateral salpingectomy 10/20/22, Spinal stenosis, and Wears glasses. has a past surgical history that includes Cholecystectomy; Dental surgery; Tonsillectomy and adenoidectomy (Bilateral, 01/07/2016); intrauterine device insertion (2017); Colposcopy;  Upper gastrointestinal Unstable Housing in the Last Year: No     Family History   Problem Relation Age of Onset    No Known Problems Paternal Grandfather     High Blood Pressure Paternal Grandmother     Breast Cancer Paternal Grandmother     High Cholesterol Paternal Grandmother     Stroke Paternal Grandmother     No Known Problems Maternal Grandmother     No Known Problems Maternal Grandfather     Diabetes Father     High Blood Pressure Father     High Cholesterol Father     Obesity Father     Other Father     Depression Mother     Asthma Brother     Other Brother         Peanut allergies and multiple other allergies    Depression Brother     Colon Cancer Paternal Uncle     Uterine Cancer Neg Hx     Ovarian Cancer Neg Hx     Heart Disease Neg Hx      Allergies:  Latex, Banana, Banana extract allergy skin test, Oley (diagnostic), Avocado, Buckwheat, Coconut fatty acids, Coconut oil, Kiwi extract, Garret flavor [flavoring agent], Other, Papaya derivatives, Peanut (diagnostic), Tape [adhesive tape], Wheat bran, and Maxalt [rizatriptan benzoate]    Home Medications:  Prior to Admission medications    Medication Sig Start Date End Date Taking? Authorizing Provider   DULoxetine (CYMBALTA) 30 MG extended release capsule Take 1 capsule by mouth daily 3/17/23 5/16/23  SATHISH Mckeon CNP   ondansetron (ZOFRAN) 4 MG tablet Take 1 tablet by mouth every 8 hours as needed for Nausea or Vomiting 10/20/22   Kelly Bran MD   cetirizine (ZYRTEC) 10 MG tablet  7/21/21   Historical Provider, MD   EPINEPHrine (EPIPEN) 0.3 MG/0.3ML SOAJ injection Inject 0.3 mg into the muscle as needed Use as directed for allergic reaction    Historical Provider, MD     REVIEW OF SYSTEMS       Review of Systems   Constitutional:  Negative for appetite change and fever. HENT:  Negative for congestion and rhinorrhea. Respiratory:  Negative for cough and shortness of breath. Cardiovascular:  Positive for chest pain.    Gastrointestinal:  Positive for abdominal

## 2023-03-23 NOTE — TELEPHONE ENCOUNTER
----- Message from Chhaya Mason sent at 3/23/2023 10:27 AM EDT -----  Subject: Message to Provider    QUESTIONS  Information for Provider? patient is on her way to the ER and wanted to   let Dr Christie Campbell know she had severe chest and abdominal pain on her way to   work and pulled over. ---------------------------------------------------------------------------  --------------  Mary Grace THOMAS  [phone]; OK to leave message on voicemail  ---------------------------------------------------------------------------  --------------  SCRIPT ANSWERS  Relationship to Patient?  Self

## 2023-03-24 ENCOUNTER — HOSPITAL ENCOUNTER (OUTPATIENT)
Dept: MRI IMAGING | Age: 38
End: 2023-03-24
Payer: COMMERCIAL

## 2023-03-24 DIAGNOSIS — M54.40 BACK PAIN OF LUMBAR REGION WITH SCIATICA: ICD-10-CM

## 2023-03-24 LAB
EKG ATRIAL RATE: 75 BPM
EKG P AXIS: 80 DEGREES
EKG P-R INTERVAL: 156 MS
EKG Q-T INTERVAL: 396 MS
EKG QRS DURATION: 66 MS
EKG QTC CALCULATION (BAZETT): 442 MS
EKG R AXIS: 77 DEGREES
EKG T AXIS: 52 DEGREES
EKG VENTRICULAR RATE: 75 BPM

## 2023-03-24 PROCEDURE — 72148 MRI LUMBAR SPINE W/O DYE: CPT

## 2023-03-27 ENCOUNTER — TELEPHONE (OUTPATIENT)
Dept: FAMILY MEDICINE CLINIC | Age: 38
End: 2023-03-27

## 2023-03-27 ENCOUNTER — OFFICE VISIT (OUTPATIENT)
Dept: FAMILY MEDICINE CLINIC | Age: 38
End: 2023-03-27
Payer: COMMERCIAL

## 2023-03-27 VITALS
HEART RATE: 63 BPM | DIASTOLIC BLOOD PRESSURE: 60 MMHG | SYSTOLIC BLOOD PRESSURE: 108 MMHG | BODY MASS INDEX: 22.99 KG/M2 | TEMPERATURE: 97.6 F | OXYGEN SATURATION: 98 % | HEIGHT: 65 IN | WEIGHT: 138 LBS

## 2023-03-27 DIAGNOSIS — M54.40 BACK PAIN OF LUMBAR REGION WITH SCIATICA: ICD-10-CM

## 2023-03-27 DIAGNOSIS — K21.9 GASTROESOPHAGEAL REFLUX DISEASE WITHOUT ESOPHAGITIS: Primary | ICD-10-CM

## 2023-03-27 PROCEDURE — G8427 DOCREV CUR MEDS BY ELIG CLIN: HCPCS

## 2023-03-27 PROCEDURE — G8484 FLU IMMUNIZE NO ADMIN: HCPCS

## 2023-03-27 PROCEDURE — G8420 CALC BMI NORM PARAMETERS: HCPCS

## 2023-03-27 PROCEDURE — 4004F PT TOBACCO SCREEN RCVD TLK: CPT

## 2023-03-27 PROCEDURE — 99214 OFFICE O/P EST MOD 30 MIN: CPT

## 2023-03-27 RX ORDER — PREDNISONE 20 MG/1
20 TABLET ORAL DAILY
Qty: 5 TABLET | Refills: 0 | Status: SHIPPED | OUTPATIENT
Start: 2023-03-27 | End: 2023-04-01

## 2023-03-27 RX ORDER — FAMOTIDINE 20 MG/1
20 TABLET, FILM COATED ORAL 2 TIMES DAILY
Qty: 60 TABLET | Refills: 3 | Status: SHIPPED | OUTPATIENT
Start: 2023-03-27

## 2023-03-27 RX ORDER — CYCLOBENZAPRINE HCL 10 MG
TABLET ORAL
COMMUNITY
Start: 2023-03-14

## 2023-03-27 SDOH — ECONOMIC STABILITY: FOOD INSECURITY: WITHIN THE PAST 12 MONTHS, THE FOOD YOU BOUGHT JUST DIDN'T LAST AND YOU DIDN'T HAVE MONEY TO GET MORE.: NEVER TRUE

## 2023-03-27 SDOH — ECONOMIC STABILITY: INCOME INSECURITY: IN THE LAST 12 MONTHS, WAS THERE A TIME WHEN YOU WERE NOT ABLE TO PAY THE MORTGAGE OR RENT ON TIME?: NO

## 2023-03-27 SDOH — ECONOMIC STABILITY: FOOD INSECURITY: WITHIN THE PAST 12 MONTHS, YOU WORRIED THAT YOUR FOOD WOULD RUN OUT BEFORE YOU GOT MONEY TO BUY MORE.: NEVER TRUE

## 2023-03-27 SDOH — ECONOMIC STABILITY: TRANSPORTATION INSECURITY
IN THE PAST 12 MONTHS, HAS LACK OF TRANSPORTATION KEPT YOU FROM MEETINGS, WORK, OR FROM GETTING THINGS NEEDED FOR DAILY LIVING?: NO

## 2023-03-27 SDOH — ECONOMIC STABILITY: TRANSPORTATION INSECURITY
IN THE PAST 12 MONTHS, HAS THE LACK OF TRANSPORTATION KEPT YOU FROM MEDICAL APPOINTMENTS OR FROM GETTING MEDICATIONS?: NO

## 2023-03-27 ASSESSMENT — PATIENT HEALTH QUESTIONNAIRE - PHQ9
SUM OF ALL RESPONSES TO PHQ QUESTIONS 1-9: 0
1. LITTLE INTEREST OR PLEASURE IN DOING THINGS: 0
2. FEELING DOWN, DEPRESSED OR HOPELESS: 0
SUM OF ALL RESPONSES TO PHQ QUESTIONS 1-9: 0
9. THOUGHTS THAT YOU WOULD BE BETTER OFF DEAD, OR OF HURTING YOURSELF: 0
SUM OF ALL RESPONSES TO PHQ QUESTIONS 1-9: 0
10. IF YOU CHECKED OFF ANY PROBLEMS, HOW DIFFICULT HAVE THESE PROBLEMS MADE IT FOR YOU TO DO YOUR WORK, TAKE CARE OF THINGS AT HOME, OR GET ALONG WITH OTHER PEOPLE: 0
4. FEELING TIRED OR HAVING LITTLE ENERGY: 0
5. POOR APPETITE OR OVEREATING: 0
3. TROUBLE FALLING OR STAYING ASLEEP: 0
SUM OF ALL RESPONSES TO PHQ9 QUESTIONS 1 & 2: 0
7. TROUBLE CONCENTRATING ON THINGS, SUCH AS READING THE NEWSPAPER OR WATCHING TELEVISION: 0
SUM OF ALL RESPONSES TO PHQ QUESTIONS 1-9: 0
6. FEELING BAD ABOUT YOURSELF - OR THAT YOU ARE A FAILURE OR HAVE LET YOURSELF OR YOUR FAMILY DOWN: 0
8. MOVING OR SPEAKING SO SLOWLY THAT OTHER PEOPLE COULD HAVE NOTICED. OR THE OPPOSITE, BEING SO FIGETY OR RESTLESS THAT YOU HAVE BEEN MOVING AROUND A LOT MORE THAN USUAL: 0

## 2023-03-27 ASSESSMENT — SOCIAL DETERMINANTS OF HEALTH (SDOH): HOW HARD IS IT FOR YOU TO PAY FOR THE VERY BASICS LIKE FOOD, HOUSING, MEDICAL CARE, AND HEATING?: NOT HARD AT ALL

## 2023-03-27 NOTE — PATIENT INSTRUCTIONS
New Updates for My [facility]AN    Thank you for choosing US to give you the best care! [facility] ([facility]) is always trying to think of new ways to help their patients. We are asking all patients to try out the new digital registration that is now available through the new [facility] JUAN, feel free to download today. Via the juan you're now able to update your personal and registration information prior to your upcoming appointment. This will save you time once you arrive at the office to check-in, not to mention your information remains safe!! Many other perks come from signing up for an account, such as:  Requesting refills  Scheduling an appointment  Completing an E-Visit  Sending a message to the office/provider  Having access to your medication list  Paying your bill/copay prior to your appointment  Scheduling your yearly mammogram  Review your test results    If you are not familiar with the [facility] JUAN, please ask one of us and we will be happy to answer any questions or help you set-up your account.

## 2023-03-27 NOTE — PROGRESS NOTES
While intending to generate a document that actually reflects the content of the visit, no guarantees can be provided that every mistake has been identified and corrected by editing.     Electronically signed by SATHISH Dunne CNP, APRN-CNP on 3/28/2023 at 2:16 PM

## 2023-03-27 NOTE — TELEPHONE ENCOUNTER
[facility] ED Follow up Call    Reason for ED visit:  chest pain      3/27/2023     Silverio Farnsworth , this is Sumi from Dr. Bradley Lopez office, just calling to see how you are doing after your recent ED visit. Did you receive discharge instructions? Yes  Do you understand the discharge instructions? Yes  Did the ED give you any new prescriptions? Yes  Were you able to fill your prescriptions? Yes      Do you have one of our red, yellow and green  Zone sheets that help you to determine when you should go to the ED? Not Applicable      Do you need or want to make a follow up appt with your PCP? Not Applicable    Do you have any further needs in the home, e.g. equipment?   Not Applicable        FU appts/Provider:    Future Appointments   Date Time Provider Ariella Elaine   3/27/2023  2:15 PM SATHISH Arce - JOYCE LORENZO [facility]   4/24/2023  9:00 AM Gwendloyn Castleman, MD [address] [place] [address]

## 2023-03-28 PROBLEM — K21.9 GASTROESOPHAGEAL REFLUX DISEASE WITHOUT ESOPHAGITIS: Status: ACTIVE | Noted: 2023-03-28

## 2023-03-28 ASSESSMENT — ENCOUNTER SYMPTOMS
ABDOMINAL DISTENTION: 1
BLOOD IN STOOL: 0
SORE THROAT: 0
SINUS PAIN: 0
SINUS PRESSURE: 0
SHORTNESS OF BREATH: 0
EYE DISCHARGE: 0
TROUBLE SWALLOWING: 0
ABDOMINAL PAIN: 0
DIARRHEA: 0
EYE ITCHING: 0
NAUSEA: 0
CONSTIPATION: 0
CHEST TIGHTNESS: 0
WHEEZING: 0
COUGH: 0
EYE REDNESS: 0
VOMITING: 0

## 2023-04-24 ENCOUNTER — INITIAL CONSULT (OUTPATIENT)
Dept: PAIN MANAGEMENT | Age: 38
End: 2023-04-24
Payer: COMMERCIAL

## 2023-04-24 VITALS — HEIGHT: 65 IN | BODY MASS INDEX: 22.99 KG/M2 | WEIGHT: 138 LBS

## 2023-04-24 DIAGNOSIS — M47.812 CERVICAL SPONDYLOSIS: ICD-10-CM

## 2023-04-24 DIAGNOSIS — M47.817 LUMBOSACRAL SPONDYLOSIS WITHOUT MYELOPATHY: Primary | ICD-10-CM

## 2023-04-24 PROCEDURE — 99204 OFFICE O/P NEW MOD 45 MIN: CPT | Performed by: PAIN MEDICINE

## 2023-04-24 PROCEDURE — G8427 DOCREV CUR MEDS BY ELIG CLIN: HCPCS | Performed by: PAIN MEDICINE

## 2023-04-24 PROCEDURE — G8420 CALC BMI NORM PARAMETERS: HCPCS | Performed by: PAIN MEDICINE

## 2023-04-24 PROCEDURE — 4004F PT TOBACCO SCREEN RCVD TLK: CPT | Performed by: PAIN MEDICINE

## 2023-04-24 ASSESSMENT — ENCOUNTER SYMPTOMS
BACK PAIN: 1
BOWEL INCONTINENCE: 0

## 2023-04-24 NOTE — PROGRESS NOTES
DX/THER AGT PVRT FACET JT LMBR/SAC 2ND LEVEL    AR NJX DX/THER AGT PVRT FACET JT LMBR/SAC 1 LEVEL       Assessment:  1. Lumbosacral spondylosis without myelopathy    2. Cervical spondylosis        Treatment Plan:  DISCUSSION: Treatment options discussed with patient and all questions answered to patient's satisfaction. OARRS Review: Reviewed and acceptable for medications prescribed. TREATMENT OPTIONS:     Discussed different treatment options including continued conservative care such as physical therapy, chiropractic care, acupuncture. Discussed different interventional options such as epidural steroids or medial branch blocks. Also discussed neuromodulation in the form of spinal cord stimulation. Also discussed surgical evaluation. Discussed the importance of continued physical therapy and exercise program as well. Wishes to consider injections. Has failed conservative options, significant axial low back pain with degenerative changes on MRI,  candidate for diagnostic lumbar medial branch block at bilateral L4-5 and L5-S1 to see if pain is facet mediated, if this is beneficial would be a candidate for radiofrequency ablation. Does use medical marijuana, would hold off on medication management at this time    Wishes to address her low back at this time. Consider updated MRI of the cervical spine in the future. Amy Cummings M.D. I have reviewed the chief complaint and history of present illness (including ROS and PFSH) and vital documentation by my staff and I agree with their documentation and have added where applicable.

## 2023-05-10 ENCOUNTER — HOSPITAL ENCOUNTER (OUTPATIENT)
Dept: PHYSICAL THERAPY | Facility: CLINIC | Age: 38
Setting detail: THERAPIES SERIES
Discharge: HOME | End: 2023-05-10
Payer: COMMERCIAL

## 2023-05-10 PROCEDURE — 97530 THERAPEUTIC ACTIVITIES: CPT

## 2023-05-10 PROCEDURE — 97162 PT EVAL MOD COMPLEX 30 MIN: CPT

## 2023-05-10 PROCEDURE — 97140 MANUAL THERAPY 1/> REGIONS: CPT

## 2023-05-10 NOTE — CONSULTS
[] [facility]) [facility] &  Therapy  [address]  [phone]  F: [phone] [] [address]  [facility]   [address]  P: [phone]  F: [phone] [] [address] &  Therapy  [address]  P: [phone]  F: [phone] [x] [address]  P: [phone]  F: [phone] [] [address]  [facility]   [address]   [place]: [phone]  F: [phone]      Physical Therapy Spine Evaluation    Date:  5/10/2023  Patient: Elza Loyd  : 1985  MRN: [id number]  Physician: Oleg Peguero MD     Insurance: [employer] (30 visit)  Medical Diagnosis: M47.817 (ICD-10-CM) - Lumbosacral spondylosis without myelopathy    Rehab Codes: M54.5 low back pain, M54.2 neck  Onset Date: 2008  Next 's appt.: 23    Subjective:   CC: Low back and cervical pain  HPI: Pt CC began approx 15 yrs ago w/ no known cause. Pt reports her LBP being worse than her cervical pain as she has no pain in either at rest but 8/10 LBP and 5/10 cervical pain w/ flare ups. Pt reported constant L side radiating pain causing tingling and numbness in her L arm and leg resulting in a few falls. Pt noted it takes 5-7 days for LBP to calm. Pt has tried PT for her back 3-4 times with some relief. Pt has been performing her stretches and exercises she learned from PT with stretches helping most w/ back flare ups. Pt.'s PCP also referred her to pain management she is to have a nerve block next week.        PMHx: [] Unremarkable [] Diabetes [] HTN  [] Pacemaker   [] MI/Heart Problems [] Cancer [x] Arthritis [] Other:              [x] Refer to full medical chart  In EPIC       Comorbidities:   [] Obesity [] Dialysis  [] N/A   [] Asthma/COPD [] Dementia [x]

## 2023-05-18 ENCOUNTER — HOSPITAL ENCOUNTER (OUTPATIENT)
Dept: PAIN MANAGEMENT | Facility: CLINIC | Age: 38
Discharge: HOME | End: 2023-05-18
Payer: COMMERCIAL

## 2023-05-18 VITALS
HEIGHT: 65 IN | HEART RATE: 62 BPM | BODY MASS INDEX: 22.99 KG/M2 | TEMPERATURE: 97.4 F | SYSTOLIC BLOOD PRESSURE: 113 MMHG | WEIGHT: 138 LBS | OXYGEN SATURATION: 97 % | DIASTOLIC BLOOD PRESSURE: 78 MMHG | RESPIRATION RATE: 8 BRPM

## 2023-05-18 DIAGNOSIS — R52 PAIN MANAGEMENT: ICD-10-CM

## 2023-05-18 LAB — HCG, PREGNANCY URINE (POC): NEGATIVE

## 2023-05-18 PROCEDURE — 81025 URINE PREGNANCY TEST: CPT

## 2023-05-18 PROCEDURE — 6360000002 HC RX W HCPCS: Performed by: PAIN MEDICINE

## 2023-05-18 PROCEDURE — 64493 INJ PARAVERT F JNT L/S 1 LEV: CPT

## 2023-05-18 PROCEDURE — 64495 INJ PARAVERT F JNT L/S 3 LEV: CPT

## 2023-05-18 PROCEDURE — 64494 INJ PARAVERT F JNT L/S 2 LEV: CPT

## 2023-05-18 PROCEDURE — 2500000003 HC RX 250 WO HCPCS: Performed by: PAIN MEDICINE

## 2023-05-18 RX ORDER — MIDAZOLAM HYDROCHLORIDE 2 MG/2ML
INJECTION, SOLUTION INTRAMUSCULAR; INTRAVENOUS
Status: COMPLETED | OUTPATIENT
Start: 2023-05-18 | End: 2023-05-18

## 2023-05-18 RX ORDER — BUPIVACAINE HYDROCHLORIDE 2.5 MG/ML
INJECTION, SOLUTION EPIDURAL; INFILTRATION; INTRACAUDAL
Status: COMPLETED | OUTPATIENT
Start: 2023-05-18 | End: 2023-05-18

## 2023-05-18 RX ADMIN — BUPIVACAINE HYDROCHLORIDE 10 ML: 2.5 INJECTION, SOLUTION EPIDURAL; INFILTRATION; INTRACAUDAL; PERINEURAL at 16:24

## 2023-05-18 RX ADMIN — MIDAZOLAM HYDROCHLORIDE 1 MG: 1 INJECTION, SOLUTION INTRAMUSCULAR; INTRAVENOUS at 16:12

## 2023-05-18 ASSESSMENT — PAIN - FUNCTIONAL ASSESSMENT: PAIN_FUNCTIONAL_ASSESSMENT: 0-10

## 2023-05-18 ASSESSMENT — PAIN DESCRIPTION - DESCRIPTORS: DESCRIPTORS: SHOOTING;STABBING

## 2023-05-18 NOTE — OP NOTE
medial branch nerve innervating the  Bilateral L4 - 5 and L5 - S1 facet joints. Patient tolerated the procedure well, no complications occurred. At the end of the injection the physician withdrew the needle and the nurse applied a sterile bandage to the site. Patient transferred to the recovery room in satisfactory condition. Appropriate written discharge instructions were given to the patient. If good results are obtained, Patient would be a candidate for Radiofrequency Ablation.       Torri Austin MD

## 2023-05-18 NOTE — H&P
review of systems was performed, and negative as pertinent to diagnosis, except as stated in HPI. Physical Exam  Constitutional:       Appearance: Normal appearance. Pulmonary:      Effort: Pulmonary effort is normal.   Neurological:      Mental Status: alert. Psychiatric:         Attention and Perception: Attention and perception normal.         Mood and Affect: Mood and affect normal.   Cardiovascular:      Rate: Normal rate. ASA: 3          Mallampati: 2       Patient's current physical status, medications, medical history, and HPI have been reviewed and updated as appropriate on this date: 05/18/23    Risk/Benefit(s): The risks, benefits, alternatives, and potential complications have been discussed with the patient/family and informed consent has been obtained for the procedure/sedation.     Diagnosis:   spondylosis      Plan: pamela Leroy MD

## 2023-05-18 NOTE — DISCHARGE INSTRUCTIONS
You have received a sedative/anesthetic therefore you should not consume any alcoholic beverages for 24 hours. Do not drive or operate machinery for 24 hours. Do not take a tub bath for 72 hours after procedure (this includes hot tubs). You may shower, but avoid hot water to injection site. Avoid strenuous activity TODAY especially if you experience dizziness. Remove band-aid the next day. Wash off any residual iodine 24 hours from today. Do not use heat, heating pad, or any other heating device over the injection site for 3 days after the procedure. If you experience pain after your procedure, you may continue with your current pain medication as prescribed. (DO NOT INCREASE YOUR PAIN MEDICATION WITHOUT TALKING TO DOCTOR)  Soreness and pain at injection site is common, may use ice to reduce soreness. Please complete pain diary as instructed. The office staff will call you to discuss the results of the procedure within 24 hours, please call the office if you do not hear from them.       Call Ari Reis at [phone] if you experience:   Fever, chills or temperature over 100    Vomiting, headache, persistent stiff neck, nausea or blurred vision   Difficulty urinating or unable to urinate within 8 hours   Increase in weakness, numbness or loss of function of limbs  Increased redness, swelling or drainage at the injection site

## 2023-05-19 ENCOUNTER — HOSPITAL ENCOUNTER (OUTPATIENT)
Dept: PHYSICAL THERAPY | Facility: CLINIC | Age: 38
Setting detail: THERAPIES SERIES
Discharge: HOME | End: 2023-05-19
Payer: COMMERCIAL

## 2023-05-19 PROCEDURE — 97140 MANUAL THERAPY 1/> REGIONS: CPT

## 2023-05-19 PROCEDURE — 97110 THERAPEUTIC EXERCISES: CPT

## 2023-05-19 NOTE — FLOWSHEET NOTE
toward long and short term goals.     [] Specific Instructions for subsequent treatments:      Frequency:  2 x/week for 16 visits     Time In: 10:10am            Time Out: 11:00am    Electronically signed by:  Flower Christina PTA

## 2023-05-22 ENCOUNTER — TELEPHONE (OUTPATIENT)
Dept: PAIN MANAGEMENT | Age: 38
End: 2023-05-22

## 2023-05-22 NOTE — TELEPHONE ENCOUNTER
S/P: Lumbar Facet Nerve Block Multiple Levels  Bilateral L4 - 5 and L5 - S1.      DOS: 05/18/2023    Pain  before procedure with activity : 5    Pain after procedure with activity: 0    Activities following procedure include: Cleaned the house, did laundry    % of pain relief: 95%    Procedure successful: Yes    OR scheduled: 06/08/2023

## 2023-05-24 ENCOUNTER — HOSPITAL ENCOUNTER (OUTPATIENT)
Dept: PHYSICAL THERAPY | Facility: CLINIC | Age: 38
Setting detail: THERAPIES SERIES
Discharge: HOME | End: 2023-05-24
Payer: COMMERCIAL

## 2023-05-24 PROCEDURE — 97140 MANUAL THERAPY 1/> REGIONS: CPT

## 2023-05-24 PROCEDURE — 97110 THERAPEUTIC EXERCISES: CPT

## 2023-05-24 NOTE — FLOWSHEET NOTE
[] [facility]) [facility] &  Therapy  [address]  [phone]  F: [phone] [] [address]  [address] [address]   [address]  P: [phone]  F: [phone] [] [place] &  Therapy  [address]  P: [phone]  F: [phone] [x] [address]  P: [phone]  F: [phone] [] [address]  [facility]   [address]   [place]: [phone]  F: [phone]      Physical Therapy Daily Treatment Note    Date:  2023  Patient Name:  Ale Heller    :  1985  MRN: [id number]  Physician: Milus Dakin, MD                                     Insurance: [employer] [facility] (30 visit)  Medical Diagnosis: M47.817 (ICD-10-CM) - Lumbosacral spondylosis without myelopathy                   Rehab Codes: M54.5 low back pain, M54.2 neck    Onset Date: 2008                      Next 's appt.: 23  Visit# / total visits: 2/16    Cancels/No Shows: 0/0    Subjective:    Pain:  [x] Yes  [] No  Location: LBP/B Shld   Pain Rating: (0-10 scale) LBP 3/10, B shld pain 3/10. Pain altered Tx:  [x] No  [] Yes  Action:  Comments: Pt reports that the nerve block is wearing out. Overall she is doing better since starting PT.       Objective:  Modalities: MHP: to cervical and lumbar regions x10' at beginning of session  Precautions: Chronic pain, increased UE and LE neural tension  Exercises:  Exercise Reps/ Time Weight/ Level Comments    LTR 2x10   x   Piriformis str  2x30\" ea   Push/pull x   Cat/cow  2x10   x   Thread the needle  2x10   x   Open book stretch 10 ea   x   Roller self mob 2'   x   Roller pec fly 10   x   Ball A 2x10   x   Ball W 2x10   x   Other:     Manual:  STM to lumbar region   Prone -DI and STM to B UT, and levators  Supine -

## 2023-05-31 ENCOUNTER — APPOINTMENT (OUTPATIENT)
Dept: PHYSICAL THERAPY | Facility: CLINIC | Age: 38
End: 2023-05-31
Payer: COMMERCIAL

## 2023-06-07 ENCOUNTER — HOSPITAL ENCOUNTER (OUTPATIENT)
Dept: PHYSICAL THERAPY | Facility: CLINIC | Age: 38
Setting detail: THERAPIES SERIES
Discharge: HOME | End: 2023-06-07

## 2023-06-07 NOTE — FLOWSHEET NOTE
[] [facility]) [facility] &  Therapy  [address]    [phone]  F: [phone]   [] [address]  [facility] [address]   [address]  P: [phone]  F: [phone]  [] [address]  [address]  P: [phone]  F: [phone] [x] [address]  P: [phone]  F: [phone]  [] [address]  [address] [facility] [address]   [address]   [place]: [phone]  F: [phone]   [] [facility]  [address] [facility] [address]  [place]: [phone]   F: [phone]     Physical Therapy Cancel/No Show note    Date: 2023  Patient: Yasmin Jacobs  : 1985  MRN: [id number]    Cancels/No Shows to date:     For today's appointment patient:    [x]  Cancelled    [] Rescheduled appointment    [] No-show     Reason given by patient:    [x]  Patient ill    []  Conflicting appointment    [] No transportation      [] Conflict with work    [] No reason given    [] Weather related    [] COVID-19    [] Other:      Comments:  Pt left VM stating to not feeling well. States will call back to reschedule.        [] Next appointment was confirmed    Electronically signed by: Veena Martinez, PT

## 2023-06-08 ENCOUNTER — HOSPITAL ENCOUNTER (OUTPATIENT)
Dept: PAIN MANAGEMENT | Facility: CLINIC | Age: 38
Discharge: HOME | End: 2023-06-08
Payer: COMMERCIAL

## 2023-06-08 VITALS
BODY MASS INDEX: 22.99 KG/M2 | WEIGHT: 138 LBS | TEMPERATURE: 97.6 F | HEART RATE: 65 BPM | SYSTOLIC BLOOD PRESSURE: 97 MMHG | HEIGHT: 65 IN | OXYGEN SATURATION: 97 % | DIASTOLIC BLOOD PRESSURE: 63 MMHG | RESPIRATION RATE: 16 BRPM

## 2023-06-08 DIAGNOSIS — R52 PAIN MANAGEMENT: ICD-10-CM

## 2023-06-08 PROCEDURE — 64495 INJ PARAVERT F JNT L/S 3 LEV: CPT

## 2023-06-08 PROCEDURE — 64493 INJ PARAVERT F JNT L/S 1 LEV: CPT

## 2023-06-08 PROCEDURE — 64494 INJ PARAVERT F JNT L/S 2 LEV: CPT | Performed by: PAIN MEDICINE

## 2023-06-08 PROCEDURE — 64493 INJ PARAVERT F JNT L/S 1 LEV: CPT | Performed by: PAIN MEDICINE

## 2023-06-08 PROCEDURE — 2500000003 HC RX 250 WO HCPCS: Performed by: PAIN MEDICINE

## 2023-06-08 PROCEDURE — 6360000002 HC RX W HCPCS: Performed by: PAIN MEDICINE

## 2023-06-08 PROCEDURE — 64494 INJ PARAVERT F JNT L/S 2 LEV: CPT

## 2023-06-08 RX ORDER — MIDAZOLAM HYDROCHLORIDE 2 MG/2ML
INJECTION, SOLUTION INTRAMUSCULAR; INTRAVENOUS
Status: COMPLETED | OUTPATIENT
Start: 2023-06-08 | End: 2023-06-08

## 2023-06-08 RX ORDER — BUPIVACAINE HYDROCHLORIDE 2.5 MG/ML
INJECTION, SOLUTION EPIDURAL; INFILTRATION; INTRACAUDAL
Status: COMPLETED | OUTPATIENT
Start: 2023-06-08 | End: 2023-06-08

## 2023-06-08 RX ADMIN — MIDAZOLAM HYDROCHLORIDE 1 MG: 1 INJECTION, SOLUTION INTRAMUSCULAR; INTRAVENOUS at 09:13

## 2023-06-08 RX ADMIN — BUPIVACAINE HYDROCHLORIDE 6 ML: 2.5 INJECTION, SOLUTION EPIDURAL; INFILTRATION; INTRACAUDAL; PERINEURAL at 09:16

## 2023-06-08 RX ADMIN — MIDAZOLAM HYDROCHLORIDE 1 MG: 1 INJECTION, SOLUTION INTRAMUSCULAR; INTRAVENOUS at 09:11

## 2023-06-08 ASSESSMENT — PAIN DESCRIPTION - LOCATION: LOCATION: BACK

## 2023-06-08 ASSESSMENT — PAIN DESCRIPTION - DIRECTION: RADIATING_TOWARDS: BILAT HIPS

## 2023-06-08 ASSESSMENT — PAIN SCALES - GENERAL: PAINLEVEL_OUTOF10: 3

## 2023-06-08 ASSESSMENT — PAIN DESCRIPTION - DESCRIPTORS: DESCRIPTORS: SHOOTING;STABBING;THROBBING

## 2023-06-08 ASSESSMENT — PAIN DESCRIPTION - PAIN TYPE: TYPE: CHRONIC PAIN

## 2023-06-08 ASSESSMENT — PAIN - FUNCTIONAL ASSESSMENT
PAIN_FUNCTIONAL_ASSESSMENT: 0-10
PAIN_FUNCTIONAL_ASSESSMENT: ACTIVITIES ARE NOT PREVENTED

## 2023-06-08 ASSESSMENT — PAIN DESCRIPTION - FREQUENCY: FREQUENCY: CONTINUOUS

## 2023-06-08 ASSESSMENT — PAIN DESCRIPTION - ORIENTATION: ORIENTATION: RIGHT;LEFT;LOWER

## 2023-06-08 NOTE — DISCHARGE INSTRUCTIONS
You have received a sedative/anesthetic therefore you should not consume any alcoholic beverages for 24 hours. Do not drive or operate machinery for 24 hours. Do not take a tub bath for 72 hours after procedure (this includes hot tubs). You may shower, but avoid hot water to injection site. Avoid strenuous activity TODAY especially if you experience dizziness. Remove band-aid the next day. Wash off any residual iodine 24 hours from today. Do not use heat, heating pad, or any other heating device over the injection site for 3 days after the procedure. If you experience pain after your procedure, you may continue with your current pain medication as prescribed. (DO NOT INCREASE YOUR PAIN MEDICATION WITHOUT TALKING TO DOCTOR)  Soreness and pain at injection site is common, may use ice to reduce soreness. Please complete pain diary as instructed. The office staff will call you to discuss the results of the procedure within 24 hours, please call the office if you do not hear from them.       Call Ari Reis at [phone] if you experience:   Fever, chills or temperature over 100    Vomiting, headache, persistent stiff neck, nausea or blurred vision   Difficulty urinating or unable to urinate within 8 hours   Increase in weakness, numbness or loss of function of limbs  Increased redness, swelling or drainage at the injection site

## 2023-06-08 NOTE — OP NOTE
medial branch nerve innervating the  Bilateral L4 - 5 and L5 - S1 facet joints. Patient tolerated the procedure well, no complications occurred. At the end of the injection the physician withdrew the needle and the nurse applied a sterile bandage to the site. Patient transferred to the recovery room in satisfactory condition. Appropriate written discharge instructions were given to the patient. If good results are obtained, Patient would be a candidate for Radiofrequency Ablation.       Nina Villanueva MD

## 2023-06-08 NOTE — H&P
Focused review of systems was performed, and negative as pertinent to diagnosis, except as stated in HPI. Physical Exam  Constitutional:       Appearance: Normal appearance. Pulmonary:      Effort: Pulmonary effort is normal.   Neurological:      Mental Status: alert. Psychiatric:         Attention and Perception: Attention and perception normal.         Mood and Affect: Mood and affect normal.   Cardiovascular:      Rate: Normal rate. ASA: 3          Mallampati: 2       Patient's current physical status, medications, medical history, and HPI have been reviewed and updated as appropriate on this date: 06/08/23    Risk/Benefit(s): The risks, benefits, alternatives, and potential complications have been discussed with the patient/family and informed consent has been obtained for the procedure/sedation.     Diagnosis:   spondylosis      Plan: pamela Camargo MD

## 2023-06-09 ENCOUNTER — TELEPHONE (OUTPATIENT)
Dept: PAIN MANAGEMENT | Age: 38
End: 2023-06-09

## 2023-06-09 DIAGNOSIS — M47.817 LUMBOSACRAL SPONDYLOSIS WITHOUT MYELOPATHY: Primary | ICD-10-CM

## 2023-06-09 NOTE — TELEPHONE ENCOUNTER
S/P: Lumbar Facet Nerve Block Multiple Levels  Bilateral L4 - 5 and L5 - S1. DOS: 06/08/2023    Pain  before procedure with activity : 4    Pain after procedure with activity: 0    Activities following procedure include:  Shopping, cleaned her house    % of pain relief: 100%    Procedure successful: Yes    OR scheduled: 07/13/23

## 2023-06-28 ENCOUNTER — HOSPITAL ENCOUNTER (OUTPATIENT)
Dept: PHYSICAL THERAPY | Facility: CLINIC | Age: 38
Setting detail: THERAPIES SERIES
Discharge: HOME | End: 2023-06-28
Payer: COMMERCIAL

## 2023-06-28 PROCEDURE — 97140 MANUAL THERAPY 1/> REGIONS: CPT

## 2023-06-28 PROCEDURE — 97110 THERAPEUTIC EXERCISES: CPT

## 2023-07-05 ENCOUNTER — HOSPITAL ENCOUNTER (OUTPATIENT)
Dept: PHYSICAL THERAPY | Facility: CLINIC | Age: 38
Setting detail: THERAPIES SERIES
Discharge: HOME | End: 2023-07-05

## 2023-07-05 NOTE — FLOWSHEET NOTE
[] [facility]) - [facility] &  Therapy  [address] [facility] [place] [facility]    [phone]  F: [phone]   [] [address] [facility]  [address]   [address]  P: [phone]  F: [phone]  [] [address]  [address]  P: [phone]  F: [phone] [x] [facility]  P: [phone]  F: [phone]  [] [address] [facility]  [address]   [place]: [phone]  F: [phone]   [] [address] [facility]  [address]  [place]: [phone]   F: [phone]     Physical Therapy Cancel/No Show note    Date: 2023  Patient: Jason Sandhu  : 1985  MRN: [id number]    Cancels/No Shows to date: 0    For today's appointment patient:    [x]  Cancelled    [] Rescheduled appointment    [] No-show     Reason given by patient:    []  Patient ill    []  Conflicting appointment    [] No transportation      [] Conflict with work    [] No reason given    [] Weather related    [] COVID-19    [x] Other:      Comments:  Pt's battery isn't working in her car. Rescheduled to next week.        [x] Next appointment was confirmed    Electronically signed by: Abel Francisco

## 2023-07-13 ENCOUNTER — HOSPITAL ENCOUNTER (OUTPATIENT)
Dept: PAIN MANAGEMENT | Facility: CLINIC | Age: 38
Discharge: HOME | End: 2023-07-13
Payer: COMMERCIAL

## 2023-07-13 VITALS
BODY MASS INDEX: 20.89 KG/M2 | OXYGEN SATURATION: 100 % | WEIGHT: 130 LBS | HEART RATE: 53 BPM | DIASTOLIC BLOOD PRESSURE: 61 MMHG | HEIGHT: 66 IN | RESPIRATION RATE: 10 BRPM | SYSTOLIC BLOOD PRESSURE: 111 MMHG | TEMPERATURE: 97.7 F

## 2023-07-13 DIAGNOSIS — R52 PAIN MANAGEMENT: ICD-10-CM

## 2023-07-13 LAB — HCG, PREGNANCY URINE (POC): NEGATIVE

## 2023-07-13 PROCEDURE — 6360000002 HC RX W HCPCS: Performed by: PAIN MEDICINE

## 2023-07-13 PROCEDURE — 64635 DESTROY LUMB/SAC FACET JNT: CPT

## 2023-07-13 PROCEDURE — 64636 DESTROY L/S FACET JNT ADDL: CPT | Performed by: PAIN MEDICINE

## 2023-07-13 PROCEDURE — 81025 URINE PREGNANCY TEST: CPT

## 2023-07-13 PROCEDURE — 64636 DESTROY L/S FACET JNT ADDL: CPT

## 2023-07-13 PROCEDURE — 64635 DESTROY LUMB/SAC FACET JNT: CPT | Performed by: PAIN MEDICINE

## 2023-07-13 RX ORDER — MIDAZOLAM HYDROCHLORIDE 2 MG/2ML
INJECTION, SOLUTION INTRAMUSCULAR; INTRAVENOUS
Status: COMPLETED | OUTPATIENT
Start: 2023-07-13 | End: 2023-07-13

## 2023-07-13 RX ORDER — BUPIVACAINE HYDROCHLORIDE 2.5 MG/ML
INJECTION, SOLUTION EPIDURAL; INFILTRATION; INTRACAUDAL
Status: COMPLETED | OUTPATIENT
Start: 2023-07-13 | End: 2023-07-13

## 2023-07-13 RX ADMIN — MIDAZOLAM HYDROCHLORIDE 1 MG: 1 INJECTION, SOLUTION INTRAMUSCULAR; INTRAVENOUS at 10:43

## 2023-07-13 RX ADMIN — MIDAZOLAM HYDROCHLORIDE 1 MG: 1 INJECTION, SOLUTION INTRAMUSCULAR; INTRAVENOUS at 10:40

## 2023-07-13 RX ADMIN — BUPIVACAINE HYDROCHLORIDE 5 ML: 2.5 INJECTION, SOLUTION EPIDURAL; INFILTRATION; INTRACAUDAL; PERINEURAL at 10:46

## 2023-07-13 ASSESSMENT — PAIN DESCRIPTION - DESCRIPTORS: DESCRIPTORS: DULL;STABBING

## 2023-07-13 ASSESSMENT — PAIN - FUNCTIONAL ASSESSMENT
PAIN_FUNCTIONAL_ASSESSMENT: 0-10
PAIN_FUNCTIONAL_ASSESSMENT: PREVENTS OR INTERFERES WITH MANY ACTIVE NOT PASSIVE ACTIVITIES

## 2023-07-13 NOTE — DISCHARGE INSTRUCTIONS
You have received a sedative/anesthetic therefore you should not consume any alcoholic beverages for 24 hours. Do not drive or operate machinery for 24 hours. Do not take a tub bath for 72 hours after procedure (this includes hot tubs). You may shower, but avoid hot water to injection site. Avoid strenuous activity TODAY especially if you experience dizziness. Remove band-aid the next day. Wash off any residual iodine 24 hours from today. Do not use heat, heating pad, or any other heating device over the injection site for 3 days after the procedure. If you experience pain after your procedure, you may continue with your current pain medication as prescribed. (DO NOT INCREASE YOUR PAIN MEDICATION WITHOUT TALKING TO DOCTOR)  Soreness and pain at injection site is common, may use ice to reduce soreness.     Call Tavo at [phone] if you experience:   Fever, chills or temperature over 100    Vomiting, headache, persistent stiff neck, nausea or blurred vision   Difficulty urinating or unable to urinate within 8 hours   Increase in weakness, numbness or loss of function of limbs  Increased redness, swelling or drainage at the injection site

## 2023-07-13 NOTE — OP NOTE
Lumbar Radiofrequency Ablation:  SURGEON: Gordon Romero MD    PRE-OP DIAGNOSIS: M47.817 (lumbosacral spondylosis), M54.5 (low back pain)    POST-OP DIAGNOSIS: Same. PROCEDURE PERFORMED: Radiofrequency Ablation Medial Branch Nerve at Left L4 - 5 and L5 - S1 facet joint(s). HISTORY AND INDICATIONS: Satisfactory response with previous RFA/ medial branch blocks at the indicated levels. Recurrence of painful symptoms attributed to the above diagnosis. The planned treatment is medically necessary to relieve pain, restore function and or reduce reliance on pain medication. EBL: minimal    CONSENT: Patient has undergone the educational process with this procedure, is aware and fully understands the risks involved: potential damage to any and all body organs including possible bleeding, infection, nerve injury, paralysis, allergic reaction and headache. Patient also understands that the procedure will be undertaken in a safe, controlled and monitored setting. Patient recognizes that the benefits may include relief from pain and reduction in the oral use of medications. Patient agreed to proceed. The patient was counseled at length about the risks of contreras Covid-19 during their perioperative period and any recovery window from their procedure. The patient was made aware that contreras Covid-19  may worsen their prognosis for recovering from their procedure  and lend to a higher morbidity and/or mortality risk. All material risks, benefits, and reasonable alternatives including postponing the procedure were discussed. The patient does wish to proceed with the procedure at this time. PROCEDURE NOTE: The patient was taken to the procedure room and placed prone with the appropriate padding and positioning to assure patient comfort and physician access to the procedure site. Time out was performed prior to starting the procedure.  Fluoroscopic evaluation was utilized to target the appropriate

## 2023-07-13 NOTE — H&P
Pain Pre-Op H&P Note    Lucas Reid MD    HPI: Erin Khalil  presents with back pain.     Past Medical History:   Diagnosis Date    Abdominal pain 3/27/2015    Acid reflux     Allergic reaction to allergy skin test 09/2020    legumes, wheat, peanuts almonds    Anxiety     Fibromyalgia     Headache     migraines    HPV (human papilloma virus) anogenital infection     Insomnia     Lumbar radicular pain 9/7/2018    Movement disorder     spasms, B&B    Mycoplasma infection 9/4/2015    Neuropathy     left hands, feet    Numbness and tingling 12/19/2017    Post partum depression 3/27/2015    Rheumatic fever     S/p Hscope, Novasure endometrial ablation, Laparoscopic bilateral salpingectomy 10/20/22 10/20/2022    Spinal stenosis     Wears glasses        Past Surgical History:   Procedure Laterality Date    CHOLECYSTECTOMY      COLONOSCOPY  08/31/2021    COLONOSCOPY N/A 8/31/2021    COLONOSCOPY WITH BIOPSY performed by Prabha Nair MD at [facility] Nohelia Dey in [facility]      removed some teeth at age 1    [address] N/A 10/20/2022    [address]STEROSCOPY [address] performed by Natalia Mccartney DO at [address] N/A 10/20/2022    IUD RETRIEVAL performed by Natalia Mccartney DO at [facility] OR    SALPINGECTOMY Bilateral 10/20/2022    LAPAROSCOPIC MEDICALLY NECESSARY SALPINGECTOMY performed by Natalia Mccartney DO at [address] [place] [address] Bilateral 01/07/2016    UPPER GASTROINTESTINAL ENDOSCOPY  08/31/2021      EGD BIOPSY    UPPER GASTROINTESTINAL ENDOSCOPY N/A 8/31/2021    EGD BIOPSY performed by Prabha Nair MD at [address] [facility]       Family History   Problem Relation Age of Onset    No Known Problems Paternal Grandfather     High Blood Pressure Paternal Grandmother     Breast Cancer Paternal Grandmother     High Cholesterol Paternal Grandmother

## 2023-07-17 ENCOUNTER — TELEPHONE (OUTPATIENT)
Dept: PAIN MANAGEMENT | Age: 38
End: 2023-07-17

## 2023-07-17 ENCOUNTER — HOSPITAL ENCOUNTER (EMERGENCY)
Age: 38
Discharge: HOME | End: 2023-07-17
Attending: EMERGENCY MEDICINE
Payer: COMMERCIAL

## 2023-07-17 VITALS
BODY MASS INDEX: 21.69 KG/M2 | HEART RATE: 47 BPM | OXYGEN SATURATION: 98 % | WEIGHT: 135 LBS | RESPIRATION RATE: 18 BRPM | HEIGHT: 66 IN | SYSTOLIC BLOOD PRESSURE: 111 MMHG | TEMPERATURE: 98.2 F | DIASTOLIC BLOOD PRESSURE: 66 MMHG

## 2023-07-17 DIAGNOSIS — R11.2 NAUSEA AND VOMITING, UNSPECIFIED VOMITING TYPE: Primary | ICD-10-CM

## 2023-07-17 LAB
ALBUMIN SERPL-MCNC: 4.4 G/DL (ref 3.5–5.2)
ALP SERPL-CCNC: 58 U/L (ref 35–104)
ALT SERPL-CCNC: 12 U/L (ref 5–33)
ANION GAP SERPL CALCULATED.3IONS-SCNC: 12 MMOL/L (ref 9–17)
AST SERPL-CCNC: 16 U/L
BASOPHILS # BLD: 0.06 K/UL (ref 0–0.2)
BASOPHILS NFR BLD: 1 % (ref 0–2)
BILIRUB SERPL-MCNC: 0.3 MG/DL (ref 0.3–1.2)
BILIRUB UR QL STRIP: NEGATIVE
BUN SERPL-MCNC: 7 MG/DL (ref 6–20)
BUN/CREAT SERPL: 12 (ref 9–20)
CALCIUM SERPL-MCNC: 9 MG/DL (ref 8.6–10.4)
CHLORIDE SERPL-SCNC: 102 MMOL/L (ref 98–107)
CLARITY UR: CLEAR
CO2 SERPL-SCNC: 26 MMOL/L (ref 20–31)
COLOR UR: YELLOW
COMMENT: NORMAL
CREAT SERPL-MCNC: 0.6 MG/DL (ref 0.5–0.9)
EOSINOPHIL # BLD: 0.16 K/UL (ref 0–0.44)
EOSINOPHILS RELATIVE PERCENT: 2 % (ref 1–4)
ERYTHROCYTE [DISTWIDTH] IN BLOOD BY AUTOMATED COUNT: 13 % (ref 11.8–14.4)
GFR SERPL CREATININE-BSD FRML MDRD: >60 ML/MIN/1.73M2
GLUCOSE SERPL-MCNC: 89 MG/DL (ref 70–99)
GLUCOSE UR STRIP-MCNC: NEGATIVE MG/DL
HCG UR QL: NEGATIVE
HCT VFR BLD AUTO: 41.5 % (ref 36.3–47.1)
HGB BLD-MCNC: 13.4 G/DL (ref 11.9–15.1)
HGB UR QL STRIP.AUTO: NEGATIVE
IMM GRANULOCYTES # BLD AUTO: 0.01 K/UL (ref 0–0.3)
IMM GRANULOCYTES NFR BLD: 0 %
KETONES UR STRIP-MCNC: NEGATIVE MG/DL
LEUKOCYTE ESTERASE UR QL STRIP: NEGATIVE
LIPASE SERPL-CCNC: 31 U/L (ref 13–60)
LYMPHOCYTES # BLD: 38 % (ref 24–43)
LYMPHOCYTES NFR BLD: 2.98 K/UL (ref 1.1–3.7)
MAGNESIUM SERPL-MCNC: 1.9 MG/DL (ref 1.6–2.6)
MCH RBC QN AUTO: 28.7 PG (ref 25.2–33.5)
MCHC RBC AUTO-ENTMCNC: 32.3 G/DL (ref 28.4–34.8)
MCV RBC AUTO: 88.9 FL (ref 82.6–102.9)
MONOCYTES NFR BLD: 0.51 K/UL (ref 0.1–1.2)
MONOCYTES NFR BLD: 7 % (ref 3–12)
NEUTROPHILS NFR BLD: 52 % (ref 36–65)
NEUTS SEG NFR BLD: 4.09 K/UL (ref 1.5–8.1)
NITRITE UR QL STRIP: NEGATIVE
NRBC BLD-RTO: 0 PER 100 WBC
PH UR STRIP: 7 [PH] (ref 5–8)
PLATELET # BLD AUTO: 213 K/UL (ref 138–453)
PMV BLD AUTO: 10.6 FL (ref 8.1–13.5)
POTASSIUM SERPL-SCNC: 3.4 MMOL/L (ref 3.7–5.3)
PROT SERPL-MCNC: 6.8 G/DL (ref 6.4–8.3)
PROT UR STRIP-MCNC: NEGATIVE MG/DL
RBC # BLD AUTO: 4.67 M/UL (ref 3.95–5.11)
SODIUM SERPL-SCNC: 140 MMOL/L (ref 135–144)
SP GR UR STRIP: 1.01 (ref 1–1.03)
UROBILINOGEN UR STRIP-ACNC: NORMAL EU/DL (ref 0–1)
WBC OTHER # BLD: 7.8 K/UL (ref 3.5–11.3)

## 2023-07-17 PROCEDURE — 81025 URINE PREGNANCY TEST: CPT

## 2023-07-17 PROCEDURE — 99284 EMERGENCY DEPT VISIT MOD MDM: CPT

## 2023-07-17 PROCEDURE — 83735 ASSAY OF MAGNESIUM: CPT

## 2023-07-17 PROCEDURE — 85027 COMPLETE CBC AUTOMATED: CPT

## 2023-07-17 PROCEDURE — 81003 URINALYSIS AUTO W/O SCOPE: CPT

## 2023-07-17 PROCEDURE — 80053 COMPREHEN METABOLIC PANEL: CPT

## 2023-07-17 PROCEDURE — 2580000003 HC RX 258: Performed by: EMERGENCY MEDICINE

## 2023-07-17 PROCEDURE — 83690 ASSAY OF LIPASE: CPT

## 2023-07-17 PROCEDURE — 6360000002 HC RX W HCPCS: Performed by: EMERGENCY MEDICINE

## 2023-07-17 PROCEDURE — 96374 THER/PROPH/DIAG INJ IV PUSH: CPT

## 2023-07-17 RX ORDER — ONDANSETRON 4 MG/1
4 TABLET, ORALLY DISINTEGRATING ORAL EVERY 8 HOURS PRN
Qty: 10 TABLET | Refills: 0 | Status: SHIPPED | OUTPATIENT
Start: 2023-07-17

## 2023-07-17 RX ORDER — 0.9 % SODIUM CHLORIDE 0.9 %
1000 INTRAVENOUS SOLUTION INTRAVENOUS ONCE
Status: COMPLETED | OUTPATIENT
Start: 2023-07-17 | End: 2023-07-17

## 2023-07-17 RX ORDER — ONDANSETRON 2 MG/ML
4 INJECTION INTRAMUSCULAR; INTRAVENOUS ONCE
Status: COMPLETED | OUTPATIENT
Start: 2023-07-17 | End: 2023-07-17

## 2023-07-17 RX ADMIN — SODIUM CHLORIDE 1000 ML: 9 INJECTION, SOLUTION INTRAVENOUS at 20:05

## 2023-07-17 RX ADMIN — ONDANSETRON 4 MG: 2 INJECTION INTRAMUSCULAR; INTRAVENOUS at 20:06

## 2023-07-17 ASSESSMENT — ENCOUNTER SYMPTOMS
NAUSEA: 1
VOMITING: 1
ABDOMINAL PAIN: 0

## 2023-07-17 ASSESSMENT — PAIN - FUNCTIONAL ASSESSMENT: PAIN_FUNCTIONAL_ASSESSMENT: NONE - DENIES PAIN

## 2023-07-17 NOTE — ED NOTES
Pt  had a minimally invasive surgery on lumbar spine recently and now having HA and nausea since yesterday. Pt states she is also having vertigo on laying flat. Some visual disturbance. Pt states no weakness or numbness.  Pt had surgery on spine for numbness in [place], no numbness in [place]  07/17/23 1914

## 2023-07-17 NOTE — TELEPHONE ENCOUNTER
Patient called and left voicemail stating that she is experiencing nausea, vomiting and dizziness. Doctor made aware and advised patient to go to ER to have symptoms evaluated. Patient unreachable, voicemail left. Please advise any further information.

## 2023-07-17 NOTE — ED PROVIDER NOTES
[address] [facility]  Emergency Medicine Department    Pt Name: Gina Lang  MRN: [id number]  [address] [facility] 1985  Date of evaluation: 7/17/2023  Provider: Mesha Reece MD    CHIEF COMPLAINT     Chief Complaint   Patient presents with    Nausea    Emesis     Started a day and a half ago, patient is unable to keep any food down. Patient had a spinal ablation last Thursday, talked to the pain management office that did the procedure and they advised to get evaluated in the ED. HISTORY OF PRESENT ILLNESS  (Location/Symptom, Timing/Onset, Context/Setting,Quality, Duration, Modifying Factors, Severity.)   Gina Lang is a 43 y.o. female who presents to the emergency department complaining of nausea and vomiting since last night. She has not eaten or drank much today. She feels a sensation of motion sickness whenever she lies down or sits still for too long. She did have a minimally invasive radioablation procedure to her lower spine 4 days ago. She denies any fevers. No back pain. She has not taken anything for the symptoms. Nursing Notes were reviewed.     ALLERGIES     Latex, Banana, Banana extract allergy skin test, [place] Varina (diagnostic), Avocado, Buckwheat, Coconut (cocos nucifera), Coconut fatty acids, Kiwi extract, [place] flavor [flavoring agent], Other, Papaya derivatives, Peanut (diagnostic), Tape [adhesive tape], Wheat bran, and Maxalt [rizatriptan benzoate]    CURRENT MEDICATIONS       Discharge Medication List as of 7/17/2023  9:41 PM        CONTINUE these medications which have NOT CHANGED    Details   cyclobenzaprine (FLEXERIL) 10 MG tablet Historical Med      famotidine (PEPCID) 20 MG tablet Take 1 tablet by mouth 2 times daily, Disp-60 tablet, R-3Normal      cetirizine (ZYRTEC) 10 MG tablet Historical Med      EPINEPHrine (EPIPEN) 0.3 MG/0.3ML SOAJ injection Inject 0.3 mLs into the muscle as needed Use as directed for allergic reactionHistorical Med             PAST

## 2023-07-18 ENCOUNTER — TELEPHONE (OUTPATIENT)
Dept: FAMILY MEDICINE CLINIC | Age: 38
End: 2023-07-18

## 2023-07-18 NOTE — TELEPHONE ENCOUNTER
[facility]) ED Follow up Call    Reason for ED visit:  Nausea and vomiting      7/18/2023     Hi Tracey Friedman , this is Sumi from Dr. Juan Carlos Gomez office, just calling to see how you are doing after your recent ED visit. Did you receive discharge instructions? Yes  Do you understand the discharge instructions? Yes  Did the ED give you any new prescriptions? Yes  Were you able to fill your prescriptions? Yes      Do you have one of our red, yellow and green  Zone sheets that help you to determine when you should go to the ED? Not Applicable      Do you need or want to make a follow up appt with your PCP? No    Do you have any further needs in the home, e.g. equipment? No        FU appts/Provider:    Future Appointments   Date Time Provider [address]   7/27/2023 10:15 AM Maddie Holder MD [facility]   8/11/2023 10:00 AM SATHISH Birmingham - CNP [facility]         VOICEMAIL DOCUMENTATION - ERASE IF NOT USED  Hi, this message is for Tracey Friedman. This is Sumi Gonzales, [address] from [employer] office. Just calling to see how you are doing after your recent visit to the Emergency Room. [facility] wants to make sure you were able to fill any prescriptions and that you understand your discharge instructions. Please return our call if you need to make a follow up appointment with your provider or have any further needs. Our phone number is [phone]. Have a great day.

## 2023-07-27 ENCOUNTER — TELEPHONE (OUTPATIENT)
Dept: PAIN MANAGEMENT | Age: 38
End: 2023-07-27

## 2023-07-27 DIAGNOSIS — M47.817 LUMBOSACRAL SPONDYLOSIS WITHOUT MYELOPATHY: Primary | ICD-10-CM

## 2023-07-27 NOTE — TELEPHONE ENCOUNTER
Spoke with patient. Developed nausea and vomiting several days after lumbar RFA. Went to the ER, white count normal.  Has been feeling better. Denies any fevers, chills, worsening back pain or radicular symptoms, bowel or bladder incontinence. Had left-sided RFA and feels this was helpful. Wishes to proceed with right-sided RFA.  Will go ahead and set that up.    -Elizabeth Dickinson MD

## 2023-08-03 ENCOUNTER — HOSPITAL ENCOUNTER (OUTPATIENT)
Dept: PAIN MANAGEMENT | Facility: CLINIC | Age: 38
Discharge: HOME | End: 2023-08-03
Payer: COMMERCIAL

## 2023-08-03 VITALS
TEMPERATURE: 98.1 F | BODY MASS INDEX: 20.89 KG/M2 | HEART RATE: 66 BPM | SYSTOLIC BLOOD PRESSURE: 110 MMHG | WEIGHT: 130 LBS | HEIGHT: 66 IN | OXYGEN SATURATION: 99 % | RESPIRATION RATE: 12 BRPM | DIASTOLIC BLOOD PRESSURE: 55 MMHG

## 2023-08-03 DIAGNOSIS — R52 PAIN MANAGEMENT: ICD-10-CM

## 2023-08-03 LAB — HCG, PREGNANCY URINE (POC): NEGATIVE

## 2023-08-03 PROCEDURE — 81025 URINE PREGNANCY TEST: CPT

## 2023-08-03 ASSESSMENT — PAIN SCALES - GENERAL: PAINLEVEL_OUTOF10: 1

## 2023-08-03 ASSESSMENT — PAIN - FUNCTIONAL ASSESSMENT: PAIN_FUNCTIONAL_ASSESSMENT: ACTIVITIES ARE NOT PREVENTED

## 2023-08-03 ASSESSMENT — PAIN DESCRIPTION - LOCATION: LOCATION: BACK

## 2023-08-03 ASSESSMENT — PAIN DESCRIPTION - DIRECTION: RADIATING_TOWARDS: RIGHT LEG

## 2023-08-03 ASSESSMENT — PAIN DESCRIPTION - PAIN TYPE: TYPE: CHRONIC PAIN

## 2023-08-03 ASSESSMENT — PAIN DESCRIPTION - FREQUENCY: FREQUENCY: INTERMITTENT

## 2023-08-03 ASSESSMENT — PAIN DESCRIPTION - ORIENTATION: ORIENTATION: RIGHT;LOWER

## 2023-08-03 ASSESSMENT — PAIN DESCRIPTION - DESCRIPTORS: DESCRIPTORS: SHARP;SPASM;THROBBING

## 2023-08-03 NOTE — H&P
Pain Pre-Op H&P Note    Chilango De Dios MD    HPI: Davida Khalil  presents with back pain. Had left sided RFA on the left, reports about 95% improvement.     Past Medical History:   Diagnosis Date    Abdominal pain 3/27/2015    Acid reflux     Allergic reaction to allergy skin test 09/2020    legumes, wheat, peanuts almonds    Anxiety     Fibromyalgia     Headache     migraines    HPV (human papilloma virus) anogenital infection     Insomnia     Lumbar radicular pain 9/7/2018    Movement disorder     spasms, B&B    Mycoplasma infection 9/4/2015    Neuropathy     left hands, feet    Numbness and tingling 12/19/2017    Post partum depression 3/27/2015    Rheumatic fever     S/p Hscope, Novasure endometrial ablation, Laparoscopic bilateral salpingectomy 10/20/22 10/20/2022    Spinal stenosis     Wears glasses        Past Surgical History:   Procedure Laterality Date    CHOLECYSTECTOMY      COLONOSCOPY  08/31/2021    COLONOSCOPY N/A 8/31/2021    COLONOSCOPY WITH BIOPSY performed by Lucina Vila MD at [facility] Dr. Hardy Bautista in [address] [place] [address]      removed some teeth at age 1    [address] N/A 10/20/2022    [address] performed by Hugo Lu DO at [address] N/A 10/20/2022    IUD RETRIEVAL performed by Hugo Lu DO at [facility] OR    SALPINGECTOMY Bilateral 10/20/2022    LAPAROSCOPIC MEDICALLY NECESSARY SALPINGECTOMY performed by Hugo Lu DO at [address] [place] [address] Bilateral 01/07/2016    UPPER GASTROINTESTINAL ENDOSCOPY  08/31/2021      EGD BIOPSY    UPPER GASTROINTESTINAL ENDOSCOPY N/A 8/31/2021    EGD BIOPSY performed by Lucina Vila MD at [address] [facility]       Family History   Problem Relation Age of Onset    No Known Problems Paternal Grandfather     High Blood Pressure Paternal Grandmother     Breast Cancer

## 2023-08-03 NOTE — DISCHARGE INSTRUCTIONS
You have received a sedative/anesthetic therefore you should not consume any alcoholic beverages for 24 hours. Do not drive or operate machinery for 24 hours. Do not take a tub bath for 72 hours after procedure (this includes hot tubs). You may shower, but avoid hot water to injection site. Avoid strenuous activity TODAY especially if you experience dizziness. Remove band-aid the next day. Wash off any residual iodine 24 hours from today. Do not use heat, heating pad, or any other heating device over the injection site for 3 days after the procedure. If you experience pain after your procedure, you may continue with your current pain medication as prescribed. (DO NOT INCREASE YOUR PAIN MEDICATION WITHOUT TALKING TO DOCTOR)  Soreness and pain at injection site is common, may use ice to reduce soreness.     Call Tavo at [phone] if you experience:   Fever, chills or temperature over 100    Vomiting, headache, persistent stiff neck, nausea or blurred vision   Difficulty urinating or unable to urinate within 8 hours   Increase in weakness, numbness or loss of function of limbs  Increased redness, swelling or drainage at the injection site

## 2023-08-11 ENCOUNTER — OFFICE VISIT (OUTPATIENT)
Dept: PAIN MANAGEMENT | Age: 38
End: 2023-08-11
Payer: COMMERCIAL

## 2023-08-11 VITALS — BODY MASS INDEX: 20.89 KG/M2 | HEIGHT: 66 IN | WEIGHT: 130 LBS

## 2023-08-11 DIAGNOSIS — M47.817 LUMBOSACRAL SPONDYLOSIS WITHOUT MYELOPATHY: Primary | ICD-10-CM

## 2023-08-11 PROCEDURE — 99212 OFFICE O/P EST SF 10 MIN: CPT | Performed by: NURSE PRACTITIONER

## 2023-08-11 PROCEDURE — 4004F PT TOBACCO SCREEN RCVD TLK: CPT | Performed by: NURSE PRACTITIONER

## 2023-08-11 PROCEDURE — G8420 CALC BMI NORM PARAMETERS: HCPCS | Performed by: NURSE PRACTITIONER

## 2023-08-11 PROCEDURE — G8427 DOCREV CUR MEDS BY ELIG CLIN: HCPCS | Performed by: NURSE PRACTITIONER

## 2023-08-11 RX ORDER — IBUPROFEN 600 MG/1
TABLET ORAL
COMMUNITY

## 2023-08-11 RX ORDER — OMEPRAZOLE 20 MG/1
CAPSULE, DELAYED RELEASE ORAL
COMMUNITY

## 2023-08-11 ASSESSMENT — ENCOUNTER SYMPTOMS
COUGH: 0
CONSTIPATION: 0
SHORTNESS OF BREATH: 0
BACK PAIN: 0
BOWEL INCONTINENCE: 0

## 2023-08-11 NOTE — PROGRESS NOTES
Chief Complaint   Patient presents with    Back Pain         PM  Pt is being treated for low back pain. MRI lumbar spine with degenerative changes. She has tried PT with no benefit. She had Radiofrequency Ablation Medial Branch Nerve at Left L4 - 5 and L5 - S1 facet joint(s) 7/13/23. She reports significant, ongoing relief. She states she has no back pain at this time. She plans to start an exercise program soon. Back Pain  This is a chronic problem. The current episode started more than 1 year ago. The problem occurs intermittently. The problem has been gradually improving since onset. The pain is present in the lumbar spine and sacro-iliac. The quality of the pain is described as aching. The pain does not radiate. The pain is at a severity of 0/10. The patient is experiencing no pain. The pain is The same all the time. The symptoms are aggravated by bending, position, twisting and stress. Pertinent negatives include no bladder incontinence, bowel incontinence, chest pain or fever. She has tried home exercises, bed rest, walking, heat, ice, muscle relaxant, NSAIDs and analgesics for the symptoms. The treatment provided significant relief. Patient denies any new neurological symptoms. No bowel or bladder incontinence, no weakness, and no falling.     Past Medical History:   Diagnosis Date    Abdominal pain 3/27/2015    Acid reflux     Allergic reaction to allergy skin test 09/2020    legumes, wheat, peanuts almonds    Anxiety     Fibromyalgia     Headache     migraines    HPV (human papilloma virus) anogenital infection     Insomnia     Lumbar radicular pain 9/7/2018    Movement disorder     spasms, B&B    Mycoplasma infection 9/4/2015    Neuropathy     left hands, feet    Numbness and tingling 12/19/2017    Post partum depression 3/27/2015    Rheumatic fever     S/p Hscope, Novasure endometrial ablation, Laparoscopic bilateral salpingectomy 10/20/22 10/20/2022    Spinal stenosis     Wears glasses

## 2023-09-25 DIAGNOSIS — J01.90 ACUTE NON-RECURRENT SINUSITIS, UNSPECIFIED LOCATION: Primary | ICD-10-CM

## 2023-09-26 ENCOUNTER — TELEMEDICINE (OUTPATIENT)
Dept: FAMILY MEDICINE CLINIC | Age: 38
End: 2023-09-26
Payer: COMMERCIAL

## 2023-09-26 DIAGNOSIS — J06.9 VIRAL URI: Primary | ICD-10-CM

## 2023-09-26 DIAGNOSIS — R05.1 ACUTE COUGH: ICD-10-CM

## 2023-09-26 DIAGNOSIS — R09.81 SINUS CONGESTION: ICD-10-CM

## 2023-09-26 PROCEDURE — 99213 OFFICE O/P EST LOW 20 MIN: CPT

## 2023-09-26 PROCEDURE — G8427 DOCREV CUR MEDS BY ELIG CLIN: HCPCS

## 2023-09-26 PROCEDURE — 4004F PT TOBACCO SCREEN RCVD TLK: CPT

## 2023-09-26 PROCEDURE — G8420 CALC BMI NORM PARAMETERS: HCPCS

## 2023-09-26 RX ORDER — BENZONATATE 100 MG/1
100 CAPSULE ORAL 3 TIMES DAILY PRN
Qty: 30 CAPSULE | Refills: 0 | Status: SHIPPED | OUTPATIENT
Start: 2023-09-26 | End: 2023-10-06

## 2023-09-26 RX ORDER — GUAIFENESIN 600 MG/1
1200 TABLET, EXTENDED RELEASE ORAL 2 TIMES DAILY
Qty: 40 TABLET | Refills: 0 | Status: SHIPPED | OUTPATIENT
Start: 2023-09-26 | End: 2023-10-06

## 2023-09-26 ASSESSMENT — ENCOUNTER SYMPTOMS
EYE REDNESS: 0
COUGH: 1
SORE THROAT: 0
VOMITING: 0
SINUS PAIN: 0
SINUS PRESSURE: 1
TROUBLE SWALLOWING: 0
ABDOMINAL PAIN: 0
NAUSEA: 0
DIARRHEA: 0
CHEST TIGHTNESS: 0
HOARSE VOICE: 1
EYE ITCHING: 0
WHEEZING: 0
EYE DISCHARGE: 0
SHORTNESS OF BREATH: 0

## 2023-09-26 NOTE — PROGRESS NOTES
Kari Taylor, was evaluated through a synchronous (real-time) audio-video encounter. The patient (or guardian if applicable) is aware that this is a billable service, which includes applicable co-pays. This Virtual Visit was conducted with patient's (and/or legal guardian's) consent. Patient identification was verified, and a caregiver was present when appropriate. The patient was located at Home: [address] [place] [address]  Provider was located at [facility] (Appt Dept): Luda Almanza,  [address] [place] [address] (:  1985) is a Established patient, presenting virtually for evaluation of the following:    Assessment & Plan   Below is the assessment and plan developed based on review of pertinent history, physical exam, labs, studies, and medications. 1. Viral URI  -     benzonatate (TESSALON) 100 MG capsule; Take 1 capsule by mouth 3 times daily as needed for Cough, Disp-30 capsule, R-0Normal  -     guaiFENesin (MUCINEX) 600 MG extended release tablet; Take 2 tablets by mouth 2 times daily for 10 days, Disp-40 tablet, R-0Normal  2. Sinus congestion  -     guaiFENesin (MUCINEX) 600 MG extended release tablet; Take 2 tablets by mouth 2 times daily for 10 days, Disp-40 tablet, R-0Normal  3. Acute cough  -     benzonatate (TESSALON) 100 MG capsule; Take 1 capsule by mouth 3 times daily as needed for Cough, Disp-30 capsule, R-0Normal    Viral URI/sinus congestion/acute cough-  We will start the patient on Tessalon Perles for the cough and Mucinex for the sinus congestion. Patient instructed to reach out to the office or report to an urgent care if there is any new or worsening symptoms. Patient verbalizes understanding of education and instructions. She denies any further questions or concerns at this time. Return if symptoms worsen or fail to improve. Subjective    patient presents via virtual visit to discuss an acute illness.   Starting 3 days ago the

## 2023-09-29 ENCOUNTER — PATIENT MESSAGE (OUTPATIENT)
Dept: FAMILY MEDICINE CLINIC | Age: 38
End: 2023-09-29

## 2023-09-29 DIAGNOSIS — B96.89 ACUTE BACTERIAL SINUSITIS: Primary | ICD-10-CM

## 2023-09-29 DIAGNOSIS — J01.90 ACUTE BACTERIAL SINUSITIS: Primary | ICD-10-CM

## 2023-09-29 RX ORDER — AMOXICILLIN AND CLAVULANATE POTASSIUM 875; 125 MG/1; MG/1
1 TABLET, FILM COATED ORAL 2 TIMES DAILY
Qty: 14 TABLET | Refills: 0 | Status: SHIPPED | OUTPATIENT
Start: 2023-09-29 | End: 2023-10-06

## 2023-09-29 NOTE — TELEPHONE ENCOUNTER
From: Mary Beasley  Sent: 9/29/2023 8:21 AM EDT  To: [facility]  Subject: Appointment Request    The plugging in my right ear is not clearing up. Would you be able to send in that antibiotic please?

## 2023-10-06 ENCOUNTER — OFFICE VISIT (OUTPATIENT)
Dept: PAIN MANAGEMENT | Age: 38
End: 2023-10-06
Payer: COMMERCIAL

## 2023-10-06 VITALS — HEIGHT: 66 IN | WEIGHT: 130 LBS | BODY MASS INDEX: 20.89 KG/M2

## 2023-10-06 DIAGNOSIS — M79.7 FIBROMYALGIA: ICD-10-CM

## 2023-10-06 DIAGNOSIS — M47.817 LUMBOSACRAL SPONDYLOSIS WITHOUT MYELOPATHY: Primary | ICD-10-CM

## 2023-10-06 PROCEDURE — G8427 DOCREV CUR MEDS BY ELIG CLIN: HCPCS | Performed by: NURSE PRACTITIONER

## 2023-10-06 PROCEDURE — G8484 FLU IMMUNIZE NO ADMIN: HCPCS | Performed by: NURSE PRACTITIONER

## 2023-10-06 PROCEDURE — G8420 CALC BMI NORM PARAMETERS: HCPCS | Performed by: NURSE PRACTITIONER

## 2023-10-06 PROCEDURE — 99212 OFFICE O/P EST SF 10 MIN: CPT | Performed by: NURSE PRACTITIONER

## 2023-10-06 PROCEDURE — 4004F PT TOBACCO SCREEN RCVD TLK: CPT | Performed by: NURSE PRACTITIONER

## 2023-10-06 ASSESSMENT — ENCOUNTER SYMPTOMS
BACK PAIN: 1
COUGH: 0
BOWEL INCONTINENCE: 0
SHORTNESS OF BREATH: 0
CONSTIPATION: 0

## 2023-10-06 NOTE — PROGRESS NOTES
Chief Complaint   Patient presents with    Back Pain     PMH  Pt is being treated for low back pain. MRI lumbar spine with degenerative changes. She has tried PT with no benefit. She had Radiofrequency Ablation Medial Branch Nerve at Left L4 - 5 and L5 - S1 facet joint(s) 7/13/23. She reports significant, ongoing relief. Back Pain  This is a chronic problem. The current episode started more than 1 year ago. The problem occurs intermittently. The problem is unchanged. The pain is present in the lumbar spine. Quality: dull. The pain does not radiate. The pain is at a severity of 1/10. The patient is experiencing no pain. The pain is The same all the time. The symptoms are aggravated by bending, position, standing and twisting. Pertinent negatives include no bladder incontinence, bowel incontinence, chest pain or fever. She has tried bed rest, home exercises, heat, ice, walking, NSAIDs, analgesics and muscle relaxant for the symptoms. The treatment provided significant relief. Patient denies any new neurological symptoms. No bowel or bladder incontinence, no weakness, and no falling.       Past Medical History:   Diagnosis Date    Abdominal pain 3/27/2015    Acid reflux     Allergic reaction to allergy skin test 09/2020    legumes, wheat, peanuts almonds    Anxiety     Fibromyalgia     Headache     migraines    HPV (human papilloma virus) anogenital infection     Insomnia     Lumbar radicular pain 9/7/2018    Movement disorder     spasms, B&B    Mycoplasma infection 9/4/2015    Neuropathy     left hands, feet    Numbness and tingling 12/19/2017    Post partum depression 3/27/2015    Rheumatic fever     S/p Hscope, Novasure endometrial ablation, Laparoscopic bilateral salpingectomy 10/20/22 10/20/2022    Spinal stenosis     Wears glasses        Past Surgical History:   Procedure Laterality Date    CHOLECYSTECTOMY      COLONOSCOPY  08/31/2021    COLONOSCOPY N/A 8/31/2021    COLONOSCOPY WITH BIOPSY

## 2024-01-29 ENCOUNTER — OFFICE VISIT (OUTPATIENT)
Dept: PAIN MANAGEMENT | Age: 39
End: 2024-01-29
Payer: COMMERCIAL

## 2024-01-29 VITALS — WEIGHT: 140 LBS | BODY MASS INDEX: 22.5 KG/M2 | HEIGHT: 66 IN

## 2024-01-29 DIAGNOSIS — G89.29 OTHER CHRONIC PAIN: ICD-10-CM

## 2024-01-29 DIAGNOSIS — M47.817 LUMBOSACRAL SPONDYLOSIS WITHOUT MYELOPATHY: ICD-10-CM

## 2024-01-29 PROCEDURE — 4004F PT TOBACCO SCREEN RCVD TLK: CPT | Performed by: PAIN MEDICINE

## 2024-01-29 PROCEDURE — G8420 CALC BMI NORM PARAMETERS: HCPCS | Performed by: PAIN MEDICINE

## 2024-01-29 PROCEDURE — G8484 FLU IMMUNIZE NO ADMIN: HCPCS | Performed by: PAIN MEDICINE

## 2024-01-29 PROCEDURE — 99214 OFFICE O/P EST MOD 30 MIN: CPT | Performed by: PAIN MEDICINE

## 2024-01-29 PROCEDURE — G8427 DOCREV CUR MEDS BY ELIG CLIN: HCPCS | Performed by: PAIN MEDICINE

## 2024-01-29 RX ORDER — CYCLOBENZAPRINE HCL 10 MG
10 TABLET ORAL 2 TIMES DAILY PRN
Qty: 30 TABLET | Refills: 0 | Status: SHIPPED | OUTPATIENT
Start: 2024-01-29 | End: 2024-02-28

## 2024-01-29 ASSESSMENT — ENCOUNTER SYMPTOMS
BACK PAIN: 1
BOWEL INCONTINENCE: 0

## 2024-01-29 NOTE — PROGRESS NOTES
HPI:     Back Pain  This is a chronic problem. The current episode started more than 1 year ago. The problem occurs constantly. The problem is unchanged. The pain is present in the lumbar spine. The quality of the pain is described as cramping. The pain is at a severity of 5/10. The pain is mild. The pain is Worse during the day. The symptoms are aggravated by bending, sitting, standing, twisting, coughing and position. Stiffness is present All day. Pertinent negatives include no bladder incontinence or bowel incontinence. She has tried heat, ice, NSAIDs, muscle relaxant and bed rest for the symptoms. The treatment provided significant relief.     History of chronic low back pain.  Previous imaging with degenerative changes.  Has done quite well with lumbar RFA in the past.  Feels the pain has returned.  Has done physical therapy several times in the past with minimal benefit.    Patient denies any new neurological symptoms. Nobowel or bladder incontinence, no weakness, and no falling.     Review of OARRS does not show any aberrant prescription behavior.     Past Medical History:   Diagnosis Date    Abdominal pain 3/27/2015    Acid reflux     Allergic reaction to allergy skin test 09/2020    legumes, wheat, peanuts almonds    Anxiety     Fibromyalgia     Headache     migraines    HPV (human papilloma virus) anogenital infection     Insomnia     Lumbar radicular pain 9/7/2018    Movement disorder     spasms, B&B    Mycoplasma infection 9/4/2015    Neuropathy     left hands, feet    Numbness and tingling 12/19/2017    Post partum depression 3/27/2015    Rheumatic fever     S/p Hscope, Novasure endometrial ablation, Laparoscopic bilateral salpingectomy 10/20/22 10/20/2022    Spinal stenosis     Wears glasses        Past Surgical History:   Procedure Laterality Date    CHOLECYSTECTOMY      COLONOSCOPY  08/31/2021    COLONOSCOPY N/A 8/31/2021    COLONOSCOPY WITH BIOPSY performed by Jimmie Galvan MD at [facility] OR

## 2024-02-01 ENCOUNTER — HOSPITAL ENCOUNTER (OUTPATIENT)
Dept: GENERAL RADIOLOGY | Age: 39
Discharge: HOME | End: 2024-02-03
Payer: COMMERCIAL

## 2024-02-01 ENCOUNTER — HOSPITAL ENCOUNTER (OUTPATIENT)
Age: 39
Discharge: HOME | End: 2024-02-03
Payer: COMMERCIAL

## 2024-02-01 DIAGNOSIS — M47.817 LUMBOSACRAL SPONDYLOSIS WITHOUT MYELOPATHY: ICD-10-CM

## 2024-02-01 PROCEDURE — 72100 X-RAY EXAM L-S SPINE 2/3 VWS: CPT

## 2024-02-07 ENCOUNTER — TELEPHONE (OUTPATIENT)
Dept: FAMILY MEDICINE CLINIC | Age: 39
End: 2024-02-07

## 2024-02-07 NOTE — TELEPHONE ENCOUNTER
Patient called with a few concerns of low blood pressure , dizziness, feeling like she is going to faint , she was advised to go to the ER or Urgent care and declined and scheduled an appt for next week

## 2024-02-13 ENCOUNTER — HOSPITAL ENCOUNTER (OUTPATIENT)
Dept: CT IMAGING | Age: 39
Discharge: HOME | End: 2024-02-15
Payer: COMMERCIAL

## 2024-02-13 ENCOUNTER — OFFICE VISIT (OUTPATIENT)
Dept: FAMILY MEDICINE CLINIC | Age: 39
End: 2024-02-13
Payer: COMMERCIAL

## 2024-02-13 ENCOUNTER — PATIENT MESSAGE (OUTPATIENT)
Dept: FAMILY MEDICINE CLINIC | Age: 39
End: 2024-02-13

## 2024-02-13 VITALS
SYSTOLIC BLOOD PRESSURE: 100 MMHG | BODY MASS INDEX: 22.66 KG/M2 | OXYGEN SATURATION: 98 % | DIASTOLIC BLOOD PRESSURE: 62 MMHG | TEMPERATURE: 97 F | WEIGHT: 141 LBS | HEIGHT: 66 IN | HEART RATE: 62 BPM

## 2024-02-13 DIAGNOSIS — R10.31 RLQ ABDOMINAL PAIN: ICD-10-CM

## 2024-02-13 DIAGNOSIS — R10.31 RLQ ABDOMINAL PAIN: Primary | ICD-10-CM

## 2024-02-13 PROCEDURE — 74177 CT ABD & PELVIS W/CONTRAST: CPT

## 2024-02-13 PROCEDURE — 2580000003 HC RX 258

## 2024-02-13 PROCEDURE — 99213 OFFICE O/P EST LOW 20 MIN: CPT

## 2024-02-13 PROCEDURE — 4004F PT TOBACCO SCREEN RCVD TLK: CPT

## 2024-02-13 PROCEDURE — G8420 CALC BMI NORM PARAMETERS: HCPCS

## 2024-02-13 PROCEDURE — G8484 FLU IMMUNIZE NO ADMIN: HCPCS

## 2024-02-13 PROCEDURE — 6360000004 HC RX CONTRAST MEDICATION

## 2024-02-13 PROCEDURE — G8427 DOCREV CUR MEDS BY ELIG CLIN: HCPCS

## 2024-02-13 RX ORDER — SODIUM CHLORIDE 0.9 % (FLUSH) 0.9 %
10 SYRINGE (ML) INJECTION PRN
Status: DISCONTINUED | OUTPATIENT
Start: 2024-02-13 | End: 2024-02-16 | Stop reason: HOSPADM

## 2024-02-13 RX ORDER — 0.9 % SODIUM CHLORIDE 0.9 %
80 INTRAVENOUS SOLUTION INTRAVENOUS ONCE
Status: COMPLETED | OUTPATIENT
Start: 2024-02-13 | End: 2024-02-13

## 2024-02-13 RX ADMIN — SODIUM CHLORIDE 80 ML: 9 INJECTION, SOLUTION INTRAVENOUS at 09:57

## 2024-02-13 RX ADMIN — IOPAMIDOL 75 ML: 755 INJECTION, SOLUTION INTRAVENOUS at 09:57

## 2024-02-13 RX ADMIN — SODIUM CHLORIDE, PRESERVATIVE FREE 10 ML: 5 INJECTION INTRAVENOUS at 09:57

## 2024-02-13 NOTE — TELEPHONE ENCOUNTER
From: Alissa Khalil  To: Jose Luis Kunz  Sent: 2/13/2024 12:25 PM EST  Subject: CT Results    I am waiting to find out results from my CT before I go into work. Have they come back yet? Sorry not trying to rush. I did miss a voicemail and it got deleted before I could listen to it

## 2024-02-13 NOTE — PROGRESS NOTES
pain, sore throat and trouble swallowing.    Eyes:  Negative for discharge, redness and itching.   Respiratory:  Negative for cough, chest tightness, shortness of breath and wheezing.    Cardiovascular:  Negative for chest pain.   Gastrointestinal:  Positive for abdominal pain, constipation, diarrhea and nausea. Negative for abdominal distention, blood in stool and vomiting.   Genitourinary:  Negative for difficulty urinating.   Musculoskeletal:  Negative for arthralgias and neck pain.   Skin:  Negative for rash.   Neurological:  Negative for dizziness, weakness, light-headedness and headaches.   All other systems reviewed and are negative.         Objective:     Physical Exam  Vitals reviewed.   Constitutional:       General: She is not in acute distress.     Appearance: Normal appearance. She is normal weight. She is not ill-appearing.   HENT:      Head: Normocephalic and atraumatic.      Nose: Nose normal. No congestion or rhinorrhea.      Mouth/Throat:      Mouth: Mucous membranes are moist.      Pharynx: Oropharynx is clear. No oropharyngeal exudate or posterior oropharyngeal erythema.   Eyes:      Extraocular Movements: Extraocular movements intact.      Conjunctiva/sclera: Conjunctivae normal.      Pupils: Pupils are equal, round, and reactive to light.   Cardiovascular:      Rate and Rhythm: Normal rate and regular rhythm.      Pulses: Normal pulses.      Heart sounds: Normal heart sounds. No murmur heard.  Pulmonary:      Effort: Pulmonary effort is normal. No respiratory distress.      Breath sounds: Normal breath sounds. No wheezing.   Abdominal:      General: Abdomen is flat. Bowel sounds are increased. There is no distension.      Palpations: Abdomen is soft. There is no splenomegaly or mass.      Tenderness: There is generalized abdominal tenderness and tenderness in the right lower quadrant. There is rebound. There is no guarding. Positive signs include Rovsing's sign, psoas sign and obturator sign.

## 2024-02-20 ENCOUNTER — TELEPHONE (OUTPATIENT)
Dept: GASTROENTEROLOGY | Age: 39
End: 2024-02-20

## 2024-02-22 ENCOUNTER — HOSPITAL ENCOUNTER (OUTPATIENT)
Dept: PAIN MANAGEMENT | Facility: CLINIC | Age: 39
Discharge: HOME | End: 2024-02-22

## 2024-02-22 NOTE — DISCHARGE INSTRUCTIONS
You have received a sedative/anesthetic therefore you should not consume any alcoholic beverages for 24 hours.  Do not drive or operate machinery for 24 hours.   Do not take a tub bath for 72 hours after procedure (this includes hot tubs).  You may shower, but avoid hot water to injection site.   Avoid strenuous activity TODAY especially if you experience dizziness.   Remove band-aid the next day.    Wash off any residual iodine 24 hours from today.   Do not use heat, heating pad, or any other heating device over the injection site for 3 days after the procedure.    If you experience pain after your procedure, you may continue with your current pain medication as prescribed.  (DO NOT INCREASE YOUR PAIN MEDICATION WITHOUT TALKING TO DOCTOR)  Soreness and pain at injection site is common, may use ice to reduce soreness.    Call [facility] at [phone] if you experience:   Fever, chills or temperature over 100    Vomiting, headache, persistent stiff neck, nausea or blurred vision   Difficulty urinating or unable to urinate within 8 hours   Increase in weakness, numbness or loss of function of limbs  Increased redness, swelling or drainage at the injection site

## 2024-02-29 ENCOUNTER — HOSPITAL ENCOUNTER (OUTPATIENT)
Age: 39
Setting detail: SPECIMEN
Discharge: HOME | End: 2024-02-29

## 2024-03-01 LAB
C DIFFICILE TOXINS, PCR: NORMAL
CAMPYLOBACTER DNA SPEC NAA+PROBE: NORMAL
ETEC ELTA+ESTB GENES STL QL NAA+PROBE: NORMAL
P SHIGELLOIDES DNA STL QL NAA+PROBE: NORMAL
SALMONELLA DNA SPEC QL NAA+PROBE: NORMAL
SHIGA TOXIN STX GENE SPEC NAA+PROBE: NORMAL
SHIGELLA DNA SPEC QL NAA+PROBE: NORMAL
SPECIMEN DESCRIPTION: NORMAL
SPECIMEN DESCRIPTION: NORMAL
V CHOL+PARA RFBL+TRKH+TNAA STL QL NAA+PR: NORMAL
Y ENTERO RECN STL QL NAA+PROBE: NORMAL

## 2024-03-03 LAB — CALPROTECTIN, FECAL: <5 UG/G

## 2024-03-04 LAB — FECAL PANCREATIC ELASTASE-1: >800 UG/G

## 2024-04-29 ENCOUNTER — TELEMEDICINE (OUTPATIENT)
Dept: FAMILY MEDICINE CLINIC | Age: 39
End: 2024-04-29
Payer: COMMERCIAL

## 2024-04-29 DIAGNOSIS — K21.9 GASTROESOPHAGEAL REFLUX DISEASE WITHOUT ESOPHAGITIS: Primary | ICD-10-CM

## 2024-04-29 PROCEDURE — G8427 DOCREV CUR MEDS BY ELIG CLIN: HCPCS

## 2024-04-29 PROCEDURE — 99214 OFFICE O/P EST MOD 30 MIN: CPT

## 2024-04-29 RX ORDER — ACETAMINOPHEN 500 MG
500 TABLET ORAL EVERY 6 HOURS PRN
COMMUNITY
Start: 2024-04-19

## 2024-04-29 RX ORDER — ONDANSETRON 4 MG/1
4 TABLET, ORALLY DISINTEGRATING ORAL EVERY 8 HOURS PRN
COMMUNITY
Start: 2024-04-19

## 2024-04-29 RX ORDER — KETOROLAC TROMETHAMINE 10 MG/1
10 TABLET, FILM COATED ORAL EVERY 6 HOURS PRN
COMMUNITY
Start: 2024-04-19

## 2024-04-29 RX ORDER — OMEPRAZOLE 20 MG/1
20 CAPSULE, DELAYED RELEASE ORAL DAILY
Qty: 90 CAPSULE | Refills: 0 | Status: SHIPPED | OUTPATIENT
Start: 2024-04-29 | End: 2024-07-28

## 2024-04-29 NOTE — PROGRESS NOTES
Alissa Khalil, was evaluated through a synchronous (real-time) audio-video encounter. The patient (or guardian if applicable) is aware that this is a billable service, which includes applicable co-pays. This Virtual Visit was conducted with patient's (and/or legal guardian's) consent. Patient identification was verified, and a caregiver was present when appropriate.   The patient was located at Home: [address] [place] [address]  Provider was located at Facility (Appt Dept): [address] [place] [address]  Confirm you are appropriately licensed, registered, or certified to deliver care in the state where the patient is located as indicated above. If you are not or unsure, please re-schedule the visit: Yes, I confirm.     Alissa Khalil (:  1985) is a Established patient, presenting virtually for evaluation of the following:    Assessment & Plan   Below is the assessment and plan developed based on review of pertinent history, physical exam, labs, studies, and medications.  1. Gastroesophageal reflux disease without esophagitis  -     omeprazole (PRILOSEC) 20 MG delayed release capsule; Take 1 capsule by mouth Daily, Disp-90 capsule, R-0Normal    No follow-ups on file.       Subjective   Patient was recently seen in emergency department for abdominal pain.  She was diagnosed with a right ovarian cyst.  She also continues to have upper abdominal pain that is diffuse after eating.  There is no apparent triggering food or alleviating factors.  Patient does follow with GYN and gastroenterology.  She is encouraged to follow with both the specialties as soon as possible for further evaluation workup.    Patient is no further concerns for today's visit.  Previous notes reviewed patient's chart.      Review of Systems   Constitutional:  Negative for chills, fatigue and fever.   HENT:  Negative for ear discharge, ear pain, sinus pressure, sinus pain, sore throat and trouble

## 2024-05-02 ASSESSMENT — ENCOUNTER SYMPTOMS
WHEEZING: 0
EYE ITCHING: 0
ABDOMINAL PAIN: 1
SINUS PAIN: 0
NAUSEA: 0
CHEST TIGHTNESS: 0
TROUBLE SWALLOWING: 0
SINUS PRESSURE: 0
COUGH: 0
EYE REDNESS: 0
SORE THROAT: 0
VOMITING: 0
SHORTNESS OF BREATH: 0
EYE DISCHARGE: 0
DIARRHEA: 0

## 2024-05-23 ENCOUNTER — PATIENT MESSAGE (OUTPATIENT)
Dept: FAMILY MEDICINE CLINIC | Age: 39
End: 2024-05-23

## 2024-05-23 DIAGNOSIS — N32.89 BLADDER WALL THICKENING: Primary | ICD-10-CM

## 2024-05-23 NOTE — TELEPHONE ENCOUNTER
From: Alissa Khalil  To: Jose Luis Kunz  Sent: 5/23/2024 10:42 AM EDT  Subject: Update and Request    I saw the GI doctor again today. We are scheduling an MRI and an endoscopy. She is also upping my omeprazole to 40 mg.     She did mention me possibly seeing a Urologist concerning the thickening of my bladder wall. She says it hasn’t changed from Nov to April and it might be something for me to look into, also. Wasn’t sure if I needed a referral or not for that.    Thank you!

## 2024-06-16 ENCOUNTER — PATIENT MESSAGE (OUTPATIENT)
Dept: FAMILY MEDICINE CLINIC | Age: 39
End: 2024-06-16

## 2024-06-17 NOTE — TELEPHONE ENCOUNTER
From: Alissa Khalil  To: Jose Luis Kunz  Sent: 6/16/2024 9:43 AM EDT  Subject: Urology    I have still not been contacted by urology for an appointment.     I had to reschedule my endoscopy and mri, for GI, due to a family emergency.

## 2024-06-18 ENCOUNTER — PATIENT MESSAGE (OUTPATIENT)
Dept: GASTROENTEROLOGY | Age: 39
End: 2024-06-18

## 2024-06-24 ENCOUNTER — TELEPHONE (OUTPATIENT)
Dept: GASTROENTEROLOGY | Age: 39
End: 2024-06-24

## 2024-06-24 NOTE — TELEPHONE ENCOUNTER
1st attempt writer left voicemail for patient to schedule new patient appointment   2nd attempt letter sent

## 2024-07-26 DIAGNOSIS — K21.9 GASTROESOPHAGEAL REFLUX DISEASE WITHOUT ESOPHAGITIS: ICD-10-CM

## 2024-07-29 RX ORDER — OMEPRAZOLE 20 MG/1
20 CAPSULE, DELAYED RELEASE ORAL DAILY
Qty: 90 CAPSULE | Refills: 0 | Status: SHIPPED | OUTPATIENT
Start: 2024-07-29 | End: 2024-10-27

## 2024-07-29 NOTE — TELEPHONE ENCOUNTER
Alissa Khalil is calling to request a refill on the following medication(s):    Medication Request:  Requested Prescriptions     Pending Prescriptions Disp Refills    omeprazole (PRILOSEC) 20 MG delayed release capsule [Pharmacy Med Name: OMEPRAZOLE DR 20 MG CAPSULE] 90 capsule 0     Sig: TAKE 1 CAPSULE BY MOUTH DAILY       Last Visit Date (If Applicable):  4/29/2024    Next Visit Date:    Visit date not found

## 2024-08-22 ENCOUNTER — PATIENT MESSAGE (OUTPATIENT)
Dept: FAMILY MEDICINE CLINIC | Age: 39
End: 2024-08-22

## 2024-08-22 DIAGNOSIS — M54.50 LUMBAR PAIN: Primary | ICD-10-CM

## 2024-09-05 ENCOUNTER — OFFICE VISIT (OUTPATIENT)
Dept: FAMILY MEDICINE CLINIC | Age: 39
End: 2024-09-05
Payer: COMMERCIAL

## 2024-09-05 VITALS
TEMPERATURE: 97 F | OXYGEN SATURATION: 99 % | DIASTOLIC BLOOD PRESSURE: 60 MMHG | SYSTOLIC BLOOD PRESSURE: 94 MMHG | BODY MASS INDEX: 22.66 KG/M2 | HEIGHT: 66 IN | WEIGHT: 141 LBS | HEART RATE: 67 BPM

## 2024-09-05 DIAGNOSIS — M54.50 LUMBAR PAIN: Primary | Chronic | ICD-10-CM

## 2024-09-05 PROCEDURE — 99214 OFFICE O/P EST MOD 30 MIN: CPT

## 2024-09-05 PROCEDURE — 4004F PT TOBACCO SCREEN RCVD TLK: CPT

## 2024-09-05 PROCEDURE — G8427 DOCREV CUR MEDS BY ELIG CLIN: HCPCS

## 2024-09-05 PROCEDURE — G8420 CALC BMI NORM PARAMETERS: HCPCS

## 2024-09-05 RX ORDER — CYCLOBENZAPRINE HCL 10 MG
10 TABLET ORAL 3 TIMES DAILY PRN
COMMUNITY
Start: 2024-08-24 | End: 2024-09-23

## 2024-09-05 SDOH — ECONOMIC STABILITY: INCOME INSECURITY: IN THE LAST 12 MONTHS, WAS THERE A TIME WHEN YOU WERE NOT ABLE TO PAY THE MORTGAGE OR RENT ON TIME?: NO

## 2024-09-05 SDOH — ECONOMIC STABILITY: FOOD INSECURITY: WITHIN THE PAST 12 MONTHS, YOU WORRIED THAT YOUR FOOD WOULD RUN OUT BEFORE YOU GOT MONEY TO BUY MORE.: NEVER TRUE

## 2024-09-05 SDOH — ECONOMIC STABILITY: FOOD INSECURITY: WITHIN THE PAST 12 MONTHS, THE FOOD YOU BOUGHT JUST DIDN'T LAST AND YOU DIDN'T HAVE MONEY TO GET MORE.: NEVER TRUE

## 2024-09-05 ASSESSMENT — PATIENT HEALTH QUESTIONNAIRE - PHQ9
SUM OF ALL RESPONSES TO PHQ QUESTIONS 1-9: 0
1. LITTLE INTEREST OR PLEASURE IN DOING THINGS: NOT AT ALL
SUM OF ALL RESPONSES TO PHQ QUESTIONS 1-9: 0
10. IF YOU CHECKED OFF ANY PROBLEMS, HOW DIFFICULT HAVE THESE PROBLEMS MADE IT FOR YOU TO DO YOUR WORK, TAKE CARE OF THINGS AT HOME, OR GET ALONG WITH OTHER PEOPLE: NOT DIFFICULT AT ALL
5. POOR APPETITE OR OVEREATING: NOT AT ALL
8. MOVING OR SPEAKING SO SLOWLY THAT OTHER PEOPLE COULD HAVE NOTICED. OR THE OPPOSITE, BEING SO FIGETY OR RESTLESS THAT YOU HAVE BEEN MOVING AROUND A LOT MORE THAN USUAL: NOT AT ALL
SUM OF ALL RESPONSES TO PHQ QUESTIONS 1-9: 0
2. FEELING DOWN, DEPRESSED OR HOPELESS: NOT AT ALL
3. TROUBLE FALLING OR STAYING ASLEEP: NOT AT ALL
SUM OF ALL RESPONSES TO PHQ QUESTIONS 1-9: 0
SUM OF ALL RESPONSES TO PHQ9 QUESTIONS 1 & 2: 0
4. FEELING TIRED OR HAVING LITTLE ENERGY: NOT AT ALL
7. TROUBLE CONCENTRATING ON THINGS, SUCH AS READING THE NEWSPAPER OR WATCHING TELEVISION: NOT AT ALL
9. THOUGHTS THAT YOU WOULD BE BETTER OFF DEAD, OR OF HURTING YOURSELF: NOT AT ALL
6. FEELING BAD ABOUT YOURSELF - OR THAT YOU ARE A FAILURE OR HAVE LET YOURSELF OR YOUR FAMILY DOWN: NOT AT ALL

## 2024-09-05 ASSESSMENT — ENCOUNTER SYMPTOMS
NAUSEA: 0
COUGH: 0
EYE ITCHING: 0
SHORTNESS OF BREATH: 0
WHEEZING: 0
SINUS PAIN: 0
EYE REDNESS: 0
CHEST TIGHTNESS: 0
ABDOMINAL PAIN: 0
SINUS PRESSURE: 0
SORE THROAT: 0
TROUBLE SWALLOWING: 0
BACK PAIN: 1
VOMITING: 0
EYE DISCHARGE: 0
DIARRHEA: 0

## 2024-09-05 ASSESSMENT — SOCIAL DETERMINANTS OF HEALTH (SDOH): HOW HARD IS IT FOR YOU TO PAY FOR THE VERY BASICS LIKE FOOD, HOUSING, MEDICAL CARE, AND HEATING?: NOT HARD AT ALL

## 2024-09-05 NOTE — PROGRESS NOTES
[facility]   [address]  [id number]  9/5/2024    Alissa Khalil is a 39 y.o. female who presents today for her medical conditions and/or complaints as noted below.    Alissa Khalil is scheduled today for Back Pain (Seen in the er for lower back and hip pain. She states that she feels better today better than she has felt since it started )  .      HPI:     This is a pleasant 47-year-old female present to the office for acute on chronic back pain ER follow-up.  On 8 23,024 the patient was seen at a local emergency department for acute bilateral low back pain without sciatica.  She was treated there with muscle relaxers and NSAIDs.  She was discharged from the ER with referral to spine care [place] through [facility].  Patient did have an updated MRI through the spine care center, and has been using over-the-counter pain medication and muscle relaxers.  She states symptoms are much better today.  Overall pain is located to the lumbar area bilaterally.  She denies any saddle paresthesia or loss of bowel or bladder control.  No surgical history on her back.    Patient has no further concerns for today's visit.  Previous notes reviewed in the chart.  Results of lumbar MRI reviewed.        Vitals:    09/05/24 0907   BP: 94/60   Pulse: 67   Temp: 97 °F (36.1 °C)   SpO2: 99%   Weight: 64 kg (141 lb)   Height: 1.676 m (5' 5.98\")      Past Medical History:   Diagnosis Date    Abdominal pain 3/27/2015    Acid reflux     Allergic reaction to allergy skin test 09/2020    legumes, wheat, peanuts almonds    Anxiety     Fibromyalgia     Headache     migraines    HPV (human papilloma virus) anogenital infection     Insomnia     Lumbar radicular pain 9/7/2018    Movement disorder     spasms, B&B    Mycoplasma infection 9/4/2015    Neuropathy     left hands, feet    Numbness and tingling 12/19/2017    Post partum depression 3/27/2015    Rheumatic fever     S/p Hscope, Novasure endometrial ablation,

## 2025-01-16 SDOH — ECONOMIC STABILITY: FOOD INSECURITY: WITHIN THE PAST 12 MONTHS, THE FOOD YOU BOUGHT JUST DIDN'T LAST AND YOU DIDN'T HAVE MONEY TO GET MORE.: NEVER TRUE

## 2025-01-16 SDOH — ECONOMIC STABILITY: INCOME INSECURITY: IN THE LAST 12 MONTHS, WAS THERE A TIME WHEN YOU WERE NOT ABLE TO PAY THE MORTGAGE OR RENT ON TIME?: NO

## 2025-01-16 SDOH — ECONOMIC STABILITY: FOOD INSECURITY: WITHIN THE PAST 12 MONTHS, YOU WORRIED THAT YOUR FOOD WOULD RUN OUT BEFORE YOU GOT MONEY TO BUY MORE.: NEVER TRUE

## 2025-01-16 ASSESSMENT — PATIENT HEALTH QUESTIONNAIRE - PHQ9
10. IF YOU CHECKED OFF ANY PROBLEMS, HOW DIFFICULT HAVE THESE PROBLEMS MADE IT FOR YOU TO DO YOUR WORK, TAKE CARE OF THINGS AT HOME, OR GET ALONG WITH OTHER PEOPLE: NOT DIFFICULT AT ALL
2. FEELING DOWN, DEPRESSED OR HOPELESS: NOT AT ALL
4. FEELING TIRED OR HAVING LITTLE ENERGY: MORE THAN HALF THE DAYS
2. FEELING DOWN, DEPRESSED OR HOPELESS: NOT AT ALL
SUM OF ALL RESPONSES TO PHQ QUESTIONS 1-9: 4
1. LITTLE INTEREST OR PLEASURE IN DOING THINGS: SEVERAL DAYS
SUM OF ALL RESPONSES TO PHQ QUESTIONS 1-9: 4
8. MOVING OR SPEAKING SO SLOWLY THAT OTHER PEOPLE COULD HAVE NOTICED. OR THE OPPOSITE - BEING SO FIDGETY OR RESTLESS THAT YOU HAVE BEEN MOVING AROUND A LOT MORE THAN USUAL: NOT AT ALL
SUM OF ALL RESPONSES TO PHQ QUESTIONS 1-9: 4
SUM OF ALL RESPONSES TO PHQ9 QUESTIONS 1 & 2: 1
6. FEELING BAD ABOUT YOURSELF - OR THAT YOU ARE A FAILURE OR HAVE LET YOURSELF OR YOUR FAMILY DOWN: NOT AT ALL
3. TROUBLE FALLING OR STAYING ASLEEP: NOT AT ALL
SUM OF ALL RESPONSES TO PHQ QUESTIONS 1-9: 4
5. POOR APPETITE OR OVEREATING: SEVERAL DAYS
10. IF YOU CHECKED OFF ANY PROBLEMS, HOW DIFFICULT HAVE THESE PROBLEMS MADE IT FOR YOU TO DO YOUR WORK, TAKE CARE OF THINGS AT HOME, OR GET ALONG WITH OTHER PEOPLE: NOT DIFFICULT AT ALL
3. TROUBLE FALLING OR STAYING ASLEEP: NOT AT ALL
9. THOUGHTS THAT YOU WOULD BE BETTER OFF DEAD, OR OF HURTING YOURSELF: NOT AT ALL
5. POOR APPETITE OR OVEREATING: SEVERAL DAYS
1. LITTLE INTEREST OR PLEASURE IN DOING THINGS: SEVERAL DAYS
9. THOUGHTS THAT YOU WOULD BE BETTER OFF DEAD, OR OF HURTING YOURSELF: NOT AT ALL
7. TROUBLE CONCENTRATING ON THINGS, SUCH AS READING THE NEWSPAPER OR WATCHING TELEVISION: NOT AT ALL
4. FEELING TIRED OR HAVING LITTLE ENERGY: MORE THAN HALF THE DAYS
7. TROUBLE CONCENTRATING ON THINGS, SUCH AS READING THE NEWSPAPER OR WATCHING TELEVISION: NOT AT ALL
6. FEELING BAD ABOUT YOURSELF - OR THAT YOU ARE A FAILURE OR HAVE LET YOURSELF OR YOUR FAMILY DOWN: NOT AT ALL
SUM OF ALL RESPONSES TO PHQ QUESTIONS 1-9: 4
8. MOVING OR SPEAKING SO SLOWLY THAT OTHER PEOPLE COULD HAVE NOTICED. OR THE OPPOSITE, BEING SO FIGETY OR RESTLESS THAT YOU HAVE BEEN MOVING AROUND A LOT MORE THAN USUAL: NOT AT ALL

## 2025-01-17 ENCOUNTER — OFFICE VISIT (OUTPATIENT)
Dept: FAMILY MEDICINE CLINIC | Age: 40
End: 2025-01-17
Payer: COMMERCIAL

## 2025-01-17 VITALS
HEART RATE: 68 BPM | WEIGHT: 147 LBS | BODY MASS INDEX: 23.63 KG/M2 | SYSTOLIC BLOOD PRESSURE: 103 MMHG | DIASTOLIC BLOOD PRESSURE: 66 MMHG | OXYGEN SATURATION: 98 % | HEIGHT: 66 IN

## 2025-01-17 DIAGNOSIS — M48.02 CERVICAL SPINAL STENOSIS: Primary | ICD-10-CM

## 2025-01-17 DIAGNOSIS — Z12.31 ENCOUNTER FOR SCREENING MAMMOGRAM FOR MALIGNANT NEOPLASM OF BREAST: ICD-10-CM

## 2025-01-17 PROCEDURE — 99214 OFFICE O/P EST MOD 30 MIN: CPT | Performed by: STUDENT IN AN ORGANIZED HEALTH CARE EDUCATION/TRAINING PROGRAM

## 2025-01-17 PROCEDURE — 4004F PT TOBACCO SCREEN RCVD TLK: CPT | Performed by: STUDENT IN AN ORGANIZED HEALTH CARE EDUCATION/TRAINING PROGRAM

## 2025-01-17 PROCEDURE — G8427 DOCREV CUR MEDS BY ELIG CLIN: HCPCS | Performed by: STUDENT IN AN ORGANIZED HEALTH CARE EDUCATION/TRAINING PROGRAM

## 2025-01-17 PROCEDURE — G8420 CALC BMI NORM PARAMETERS: HCPCS | Performed by: STUDENT IN AN ORGANIZED HEALTH CARE EDUCATION/TRAINING PROGRAM

## 2025-01-17 RX ORDER — ESCITALOPRAM OXALATE 5 MG/1
TABLET ORAL
COMMUNITY
Start: 2024-12-26

## 2025-01-17 RX ORDER — ARIPIPRAZOLE 2 MG/1
TABLET ORAL
COMMUNITY
Start: 2024-12-26

## 2025-01-17 RX ORDER — PREDNISONE 20 MG/1
20 TABLET ORAL 2 TIMES DAILY
Qty: 10 TABLET | Refills: 0 | Status: SHIPPED | OUTPATIENT
Start: 2025-01-17 | End: 2025-01-22

## 2025-01-17 ASSESSMENT — ENCOUNTER SYMPTOMS
VOMITING: 0
SHORTNESS OF BREATH: 0
COUGH: 0
NAUSEA: 0
CHEST TIGHTNESS: 0
ABDOMINAL PAIN: 0
DIARRHEA: 0
CONSTIPATION: 0
SORE THROAT: 0
EYE DISCHARGE: 0
WHEEZING: 0

## 2025-01-17 NOTE — PROGRESS NOTES
[facility]  [address]  [facility] [address]  Dept: [phone]  Dept Fax: [phone]    Alissa Khalil is a 39 y.o. female who is a Established patient, who presents today for her medical conditions/complaints as noted below:  Chief Complaint   Patient presents with    Arm Pain     Right arm pain     Shoulder Pain    Numbness     Hand - right          HPI:     HPI  History of Present Illness  The patient presents for evaluation of arm issues.    She reports experiencing symptoms suggestive of a pinched nerve, characterized by radiating pain down the posterior aspect of her arm, originating from the shoulder blade. The pain is most severe upon awakening in the morning, accompanied by swelling in 3 of her fingers. This has resulted in a diminished ability to grasp or lift heavy objects. She rates her current pain level as 3 to 4 out of 10, but notes that it can escalate to the point where she is unable to  or hold objects. She has observed that her left hand exhibits slightly more strength than her right during a  test, but once the swelling subsides, she regains full hand closure. She has a history of shoulder tension and stress, which typically resolves spontaneously over time. She reports no known injuries to the shoulder or neck area but suspects cervical stenosis. 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 The pain is predominantly on the right side, while she experiences persistent numbness and tingling on the left. She has not sought orthopedic consultation for her neck issues but is under the care of Dr. Lees, a pain specialist, for lower back issues. She has previously undergone physical therapy for her back, which she found beneficial for muscle strengthening. Her current pain management regimen includes ibuprofen and a

## 2025-02-03 ENCOUNTER — HOSPITAL ENCOUNTER (OUTPATIENT)
Dept: WOMENS IMAGING | Age: 40
Discharge: HOME | End: 2025-02-05
Payer: COMMERCIAL

## 2025-02-03 VITALS — BODY MASS INDEX: 23.63 KG/M2 | HEIGHT: 66 IN | WEIGHT: 147 LBS

## 2025-02-03 DIAGNOSIS — Z12.31 ENCOUNTER FOR SCREENING MAMMOGRAM FOR MALIGNANT NEOPLASM OF BREAST: ICD-10-CM

## 2025-02-03 PROCEDURE — 77063 BREAST TOMOSYNTHESIS BI: CPT

## 2025-02-07 ENCOUNTER — HOSPITAL ENCOUNTER (OUTPATIENT)
Dept: PHYSICAL THERAPY | Facility: CLINIC | Age: 40
Setting detail: THERAPIES SERIES
Discharge: HOME | End: 2025-02-07
Payer: COMMERCIAL

## 2025-02-07 PROCEDURE — 97140 MANUAL THERAPY 1/> REGIONS: CPT

## 2025-02-07 PROCEDURE — 97110 THERAPEUTIC EXERCISES: CPT

## 2025-02-07 PROCEDURE — 97161 PT EVAL LOW COMPLEX 20 MIN: CPT

## 2025-02-07 NOTE — CONSULTS
[] [facility]  Outpatient Rehabilitation &  Therapy  [address]  [phone]  [phone] [] [facility]  Outpatient Rehabilitation &  Therapy  [address]  P: [phone]  F: [phone] [] [facility]  Outpatient Rehabilitation &  Therapy  [address]  P: [phone]  F: [phone] [x] [facility]  Outpatient Rehabilitation &  Therapy  [address]  [phone]  [phone] [] [facility]  Outpatient Rehabilitation &  Therapy  [address]   P: [phone]  F: [phone]  [] [facility]  Outpatient Rehabilitation &  Therapy  [address]  P: [phone]  F: [phone] [] [facility]  Outpatient Rehabilitation &  Therapy  [address]  P: [phone]  F: [phone] [] [facility]  Outpatient Rehabilitation &  Therapy  [address]  P: [phone]  F: [phone] [] [facility]  Outpatient Rehabilitation &  Therapy  [address]  P: [phone]  F: [phone]  [] [facility] Outpatient Rehabilitation &  Therapy  [address]  P: [phone]  F: [phone]     Physical Therapy Spine Evaluation    Date:  2025  Patient: Alissa TRACEY Remi  : 1985  MRN: [id number]  Physician: June Finn MD  Insurance: : [facility]; Kelvin yr; 30/30vs; auth after 8vs; no pt resp   Medical Diagnosis: Cervical spinal stenosis [M48.02]     Rehab Codes: M54.2   Onset Date: 2025  Next 's appt.: 2025  for MRI    Subjective:   CC: Neck pain    HPI: Pt states no known RUI but she feels like a nerve is pinched in her shoulder blade and is causing N/T in 3-5 fingers. Pt states the pain is intermittent but wakes her up throughout the night. Pt states she finds massage to help with tension but

## 2025-02-11 ENCOUNTER — HOSPITAL ENCOUNTER (OUTPATIENT)
Dept: MRI IMAGING | Age: 40
Discharge: HOME | End: 2025-02-13
Payer: COMMERCIAL

## 2025-02-11 DIAGNOSIS — M48.02 CERVICAL SPINAL STENOSIS: ICD-10-CM

## 2025-02-11 PROCEDURE — 72141 MRI NECK SPINE W/O DYE: CPT

## 2025-02-12 ENCOUNTER — HOSPITAL ENCOUNTER (OUTPATIENT)
Dept: PHYSICAL THERAPY | Facility: CLINIC | Age: 40
Setting detail: THERAPIES SERIES
Discharge: HOME | End: 2025-02-12
Payer: COMMERCIAL

## 2025-02-12 NOTE — FLOWSHEET NOTE
[] [facility]  Outpatient Rehabilitation &  Therapy  [address]  [phone]  [phone] [] [facility]  Outpatient Rehabilitation &  Therapy  [address]  P: [phone]  F: [phone] [] [facility]  Outpatient Rehabilitation &  Therapy  [address]  P: [phone]  F: [phone] [x] [facility]  Outpatient Rehabilitation &  Therapy  [address]  [phone]  [phone] [] [facility]  Outpatient Rehabilitation &  Therapy  [address]   P: [phone]  F: [phone]  [] [facility]  Outpatient Rehabilitation &  Therapy  [address]  P: [phone]  F: [phone] [] [facility]  Outpatient Rehabilitation &  Therapy  [address]  P: [phone]  F: [phone] [] [facility]  Outpatient Rehabilitation &  Therapy  [address]  P: [phone]  F: [phone] [] [facility]  Outpatient Rehabilitation &  Therapy  [address]  P: [phone]  F: [phone]  [] [facility] Outpatient Rehabilitation &  Therapy  [address]  P: [phone]  F: [phone]     Therapy Cancel/No Show note    Date: 2025  Patient: Alissa KYUNG Khalil  : 1985  MRN: [id number]    Cancels/No Shows to date: 0    For today's appointment patient:    [x]  Cancelled    [] Rescheduled appointment    [] No-show     Reason given by patient:    [x]  Patient ill    []  Conflicting appointment    [] No transportation      [] Conflict with work    [] No reason given    [] Weather related    [] COVID-19    [] Other:      Comments:        [x] Next appointment was confirmed    Electronically signed by: Dillan Witt, PTA

## 2025-02-19 ENCOUNTER — HOSPITAL ENCOUNTER (OUTPATIENT)
Dept: PHYSICAL THERAPY | Facility: CLINIC | Age: 40
Setting detail: THERAPIES SERIES
Discharge: HOME | End: 2025-02-19
Payer: COMMERCIAL

## 2025-02-19 PROCEDURE — 97110 THERAPEUTIC EXERCISES: CPT

## 2025-02-19 PROCEDURE — 97140 MANUAL THERAPY 1/> REGIONS: CPT

## 2025-02-19 NOTE — FLOWSHEET NOTE
[] [facility]  Outpatient Rehabilitation &  Therapy  [address]  [phone]  [phone] [] [facility]  Outpatient Rehabilitation &  Therapy  [address]  P: [phone]  F: [phone] [] [facility]  Outpatient Rehabilitation &  Therapy  [address]  P: [phone]  F: [phone] [x] [facility]  Outpatient Rehabilitation &  Therapy  [address]  [phone]  [phone] [] [facility]  Outpatient Rehabilitation &  Therapy  [address]   P: [phone]  F: [phone]  [] [facility]  Outpatient Rehabilitation &  Therapy  [address]  P: [phone]  F: [phone] [] [facility]  Outpatient Rehabilitation &  Therapy  [address]  P: [phone]  F: [phone] [] [facility]  Outpatient Rehabilitation &  Therapy  [address]  P: [phone]  F: [phone] [] [facility]  Outpatient Rehabilitation &  Therapy  [address]  P: [phone]  F: [phone]  [] [facility] Outpatient Rehabilitation &  Therapy  [address]  P: [phone]  F: [phone]     Physical Therapy Daily Treatment Note    Date:  2025  Patient Name:  Alissa TRACEY Remi    :  1985  MRN: [id number]  Physician: June Finn MD  Insurance: : [facility]; Kelvin yr; 30/30vs; auth after 8vs; no pt resp   Medical Diagnosis: Cervical spinal stenosis [M48.02]                       Rehab Codes: M54.2   Onset Date: 2025             Next 's appt.: 2025  for MRI  Visit# / total visits:      Cancels/No Shows: 10    Subjective:    Pain:  [x] Yes  [] No Location:  UT/Rhomboids Pain Rating: (0-10 scale) 4/10  Pain altered Tx:  [x] No  [] Yes  Action:  Comments:  Patient reports feeling relaxed after

## 2025-02-21 ENCOUNTER — HOSPITAL ENCOUNTER (OUTPATIENT)
Dept: PHYSICAL THERAPY | Facility: CLINIC | Age: 40
Setting detail: THERAPIES SERIES
Discharge: HOME | End: 2025-02-21
Payer: COMMERCIAL

## 2025-02-21 NOTE — FLOWSHEET NOTE
[] [facility]  Outpatient Rehabilitation &  Therapy  [address]  [phone]  [phone] [] [facility]  Outpatient Rehabilitation &  Therapy  [address]  P: [phone]  F: [phone] [] [facility]  Outpatient Rehabilitation &  Therapy  [address]  P: [phone]  F: [phone] [x] [facility]  Outpatient Rehabilitation &  Therapy  [address]  [phone]  [phone] [] [facility]  Outpatient Rehabilitation &  Therapy  [address]   P: [phone]  F: [phone]  [] [facility]  Outpatient Rehabilitation &  Therapy  [address]  P: [phone]  F: [phone] [] [facility]  Outpatient Rehabilitation &  Therapy  [address]  P: [phone]  F: [phone] [] [facility]  Outpatient Rehabilitation &  Therapy  [address]  P: [phone]  F: [phone] [] [facility]  Outpatient Rehabilitation &  Therapy  [address]  P: [phone]  F: [phone]  [] [facility] Outpatient Rehabilitation &  Therapy  [address]  P: [phone]  F: [phone]     Therapy Cancel/No Show note    Date: 2025  Patient: Alissa Khalil  : 1985  MRN: [id number]    Cancels/No Shows to date: 0    For today's appointment patient:    [x]  Cancelled    [] Rescheduled appointment    [] No-show     Reason given by patient:    []  Patient ill    []  Conflicting appointment    [] No transportation      [] Conflict with work    [] No reason given    [] Weather related    [] COVID-19    [x] Other:      Comments:  Pt called in and said she thought her appt was at 11am and not 10am she is going to have to cancel.      [x] Next appointment was confirmed    Electronically signed by: Bette Evans

## 2025-02-26 ENCOUNTER — HOSPITAL ENCOUNTER (OUTPATIENT)
Dept: PHYSICAL THERAPY | Facility: CLINIC | Age: 40
Setting detail: THERAPIES SERIES
Discharge: HOME | End: 2025-02-26
Payer: COMMERCIAL

## 2025-02-26 NOTE — FLOWSHEET NOTE
[] [facility]  Outpatient Rehabilitation &  Therapy  [address]  [phone]  [phone] [] [facility]  Outpatient Rehabilitation &  Therapy  [address]  P: [phone]  F: [phone] [] [facility]  Outpatient Rehabilitation &  Therapy  [address]  P: [phone]  F: [phone] [x] [facility]  Outpatient Rehabilitation &  Therapy  [address]  [phone]  [phone] [] [facility]  Outpatient Rehabilitation &  Therapy  [address]   P: [phone]  F: [phone]  [] [facility]  Outpatient Rehabilitation &  Therapy  [address]  P: [phone]  F: [phone] [] [facility]  Outpatient Rehabilitation &  Therapy  [address]  P: [phone]  F: [phone] [] [facility]  Outpatient Rehabilitation &  Therapy  [address]  P: [phone]  F: [phone] [] [facility]  Outpatient Rehabilitation &  Therapy  [address]  P: [phone]  F: [phone]  [] [facility] Outpatient Rehabilitation &  Therapy  [address]  P: [phone]  F: [phone]     Therapy Cancel/No Show note    Date: 2025  Patient: Alissa Khalil  : 1985  MRN: [id number]    Cancels/No Shows to date: 3/0    For today's appointment patient:    [x]  Cancelled    [] Rescheduled appointment    [] No-show     Reason given by patient:    []  Patient ill    []  Conflicting appointment    [] No transportation      [] Conflict with work    [] No reason given    [] Weather related    [] COVID-19    [x] Other:      Comments:  Pt called in and said her child tested Positive for Influenza A and she does not want to bring anything into office     [x] Next appointment was confirmed    Electronically signed by: Bette

## 2025-02-28 ENCOUNTER — HOSPITAL ENCOUNTER (OUTPATIENT)
Dept: PHYSICAL THERAPY | Facility: CLINIC | Age: 40
Setting detail: THERAPIES SERIES
End: 2025-02-28
Payer: COMMERCIAL

## 2025-03-14 ENCOUNTER — OFFICE VISIT (OUTPATIENT)
Dept: FAMILY MEDICINE CLINIC | Age: 40
End: 2025-03-14
Payer: COMMERCIAL

## 2025-03-14 VITALS
BODY MASS INDEX: 23.21 KG/M2 | WEIGHT: 144.4 LBS | OXYGEN SATURATION: 98 % | HEART RATE: 68 BPM | DIASTOLIC BLOOD PRESSURE: 68 MMHG | HEIGHT: 66 IN | SYSTOLIC BLOOD PRESSURE: 112 MMHG

## 2025-03-14 DIAGNOSIS — M79.89 HAND SWELLING: ICD-10-CM

## 2025-03-14 DIAGNOSIS — M48.02 CERVICAL SPINAL STENOSIS: Primary | ICD-10-CM

## 2025-03-14 PROCEDURE — 99214 OFFICE O/P EST MOD 30 MIN: CPT

## 2025-03-14 PROCEDURE — 4004F PT TOBACCO SCREEN RCVD TLK: CPT

## 2025-03-14 PROCEDURE — G8427 DOCREV CUR MEDS BY ELIG CLIN: HCPCS

## 2025-03-14 PROCEDURE — G8420 CALC BMI NORM PARAMETERS: HCPCS

## 2025-03-14 RX ORDER — ESCITALOPRAM OXALATE 10 MG/1
10 TABLET ORAL DAILY
COMMUNITY
Start: 2025-02-20

## 2025-03-14 SDOH — ECONOMIC STABILITY: FOOD INSECURITY: WITHIN THE PAST 12 MONTHS, THE FOOD YOU BOUGHT JUST DIDN'T LAST AND YOU DIDN'T HAVE MONEY TO GET MORE.: NEVER TRUE

## 2025-03-14 ASSESSMENT — PATIENT HEALTH QUESTIONNAIRE - PHQ9
1. LITTLE INTEREST OR PLEASURE IN DOING THINGS: NOT AT ALL
SUM OF ALL RESPONSES TO PHQ QUESTIONS 1-9: 0
8. MOVING OR SPEAKING SO SLOWLY THAT OTHER PEOPLE COULD HAVE NOTICED. OR THE OPPOSITE, BEING SO FIGETY OR RESTLESS THAT YOU HAVE BEEN MOVING AROUND A LOT MORE THAN USUAL: NOT AT ALL
5. POOR APPETITE OR OVEREATING: NOT AT ALL
4. FEELING TIRED OR HAVING LITTLE ENERGY: NOT AT ALL
SUM OF ALL RESPONSES TO PHQ QUESTIONS 1-9: 0
2. FEELING DOWN, DEPRESSED OR HOPELESS: NOT AT ALL
3. TROUBLE FALLING OR STAYING ASLEEP: NOT AT ALL
6. FEELING BAD ABOUT YOURSELF - OR THAT YOU ARE A FAILURE OR HAVE LET YOURSELF OR YOUR FAMILY DOWN: NOT AT ALL
9. THOUGHTS THAT YOU WOULD BE BETTER OFF DEAD, OR OF HURTING YOURSELF: NOT AT ALL
10. IF YOU CHECKED OFF ANY PROBLEMS, HOW DIFFICULT HAVE THESE PROBLEMS MADE IT FOR YOU TO DO YOUR WORK, TAKE CARE OF THINGS AT HOME, OR GET ALONG WITH OTHER PEOPLE: NOT DIFFICULT AT ALL
7. TROUBLE CONCENTRATING ON THINGS, SUCH AS READING THE NEWSPAPER OR WATCHING TELEVISION: NOT AT ALL
SUM OF ALL RESPONSES TO PHQ QUESTIONS 1-9: 0
SUM OF ALL RESPONSES TO PHQ QUESTIONS 1-9: 0

## 2025-03-14 NOTE — PROGRESS NOTES
Number of Visits Requested:   1     No orders of the defined types were placed in this encounter.      The patient (or guardian, if applicable) and other individuals in attendance with the patient were advised that Artificial Intelligence will be utilized during this visit to record, process the conversation to generate a clinical note, and support improvement of the AI technology. The patient (or guardian, if applicable) and other individuals in attendance at the appointment consented to the use of AI, including the recording.      Reviewed health maintenance, prior labs and imaging.   Patient given educational materials - see patient instructions.    Discussed use, benefit, and side effects of prescribed medications. Barriers to medication compliance addressed.   All patient questions answered.  Pt voiced understanding to plan of care.   Instructed to continue medications as discussed, healthy diet and exercise.    Patient agreed with treatment plan. Follow up as directed below.     This note is created with the assistance of a speech-recognition program. While intending to generate a document that actually reflects the content of the visit, no guarantees can be provided that every mistake has been identified and corrected by editing.    Electronically signed by SATHISH Collins CNP, APRN-CNP on 3/17/2025 at 11:42 AM

## 2025-04-16 ENCOUNTER — PATIENT MESSAGE (OUTPATIENT)
Dept: FAMILY MEDICINE CLINIC | Age: 40
End: 2025-04-16

## 2025-04-16 DIAGNOSIS — M54.42 CHRONIC BILATERAL LOW BACK PAIN WITH BILATERAL SCIATICA: Primary | ICD-10-CM

## 2025-04-16 DIAGNOSIS — M54.41 CHRONIC BILATERAL LOW BACK PAIN WITH BILATERAL SCIATICA: Primary | ICD-10-CM

## 2025-04-16 DIAGNOSIS — G89.29 CHRONIC BILATERAL LOW BACK PAIN WITH BILATERAL SCIATICA: Primary | ICD-10-CM

## 2025-04-18 RX ORDER — CYCLOBENZAPRINE HCL 10 MG
10 TABLET ORAL 3 TIMES DAILY PRN
Qty: 30 TABLET | Refills: 0 | Status: SHIPPED | OUTPATIENT
Start: 2025-04-18 | End: 2025-04-28

## 2025-05-15 ENCOUNTER — OFFICE VISIT (OUTPATIENT)
Age: 40
End: 2025-05-15
Payer: COMMERCIAL

## 2025-05-15 VITALS
HEIGHT: 66 IN | BODY MASS INDEX: 23.14 KG/M2 | SYSTOLIC BLOOD PRESSURE: 105 MMHG | WEIGHT: 144 LBS | DIASTOLIC BLOOD PRESSURE: 71 MMHG | HEART RATE: 78 BPM

## 2025-05-15 DIAGNOSIS — M47.812 CERVICAL SPONDYLOSIS: Primary | ICD-10-CM

## 2025-05-15 DIAGNOSIS — G56.21 ULNAR NEUROPATHY AT ELBOW OF RIGHT UPPER EXTREMITY: ICD-10-CM

## 2025-05-15 PROCEDURE — 99204 OFFICE O/P NEW MOD 45 MIN: CPT | Performed by: NEUROLOGICAL SURGERY

## 2025-05-15 PROCEDURE — G8427 DOCREV CUR MEDS BY ELIG CLIN: HCPCS | Performed by: NEUROLOGICAL SURGERY

## 2025-05-15 PROCEDURE — 4004F PT TOBACCO SCREEN RCVD TLK: CPT | Performed by: NEUROLOGICAL SURGERY

## 2025-05-15 PROCEDURE — G8420 CALC BMI NORM PARAMETERS: HCPCS | Performed by: NEUROLOGICAL SURGERY

## 2025-05-15 NOTE — PROGRESS NOTES
Review of Systems   Musculoskeletal:  Positive for joint swelling, neck pain and neck stiffness.   Neurological:  Positive for numbness.   All other systems reviewed and are negative.

## 2025-05-16 NOTE — PROGRESS NOTES
[facility], [facility] NEUROSURGERY  [address]  [facility] [place] [address]  Dept: [phone]  Dept Fax: [phone]     Patient:  Alissa Khalil  YOB: 1985  Date: 5/15/25      Chief Complaint   Patient presents with    New Patient     Cervical stenosis  MRI Cervical 2/11/2025 at [facility]              HPI:     History of Present Illness  The patient presents as a new patient for evaluation of neck pain.    She has been experiencing neck pain that radiates into her arms since January or February. The onset of this pain coincided with the start of a new job at [employer], which she has since left. Despite this, the pain persists, though it has improved. She describes the pain as a pressure-like sensation, predominantly in the right shoulder, extending down her arm and into the last three fingers. Although bilateral, the right side is more affected. The pain is constant, with occasional relief. It is severe enough to disrupt her sleep, often waking her up with shooting pain down her arm. She also reports weakness in her arms and difficulty with tasks requiring  strength, such as opening jars. However, she does not experience any issues with fine motor tasks.    She has a history of spinal stenosis in both the cervical and lumbar regions. An MRI conducted in 2019 was done to evaluate complete numbness in her left leg and arm, which has since improved. She states that she has learned to manage the lack of sensation in her hands by visually monitoring their movements. Physical therapy focusing on the shoulder area has not provided significant relief and may have exacerbated the pain. Cyclobenzaprine 10 mg taken at night aids sleep and manages the pain, but this only serves to maintain her sleep. An EMG test was conducted in the past due to numbness on the left side of her body.    She prefers to avoid surgery and is

## (undated) DEVICE — TROCARS: Brand: KII® BLUNT TIP ACCESS SYSTEM

## (undated) DEVICE — PAD,SANITARY,11 IN,MAXI,W/WINGS,N-STRL: Brand: MEDLINE

## (undated) DEVICE — SINGLE PORT MANIFOLD: Brand: NEPTUNE 2

## (undated) DEVICE — GEL US 20GM NONIRRITATING OVERWRAPPED FILE PCH TRNSMIT

## (undated) DEVICE — SOLUTION IRRIG 3000ML 0.9% SOD CHL USP UROMATIC PLAS CONT

## (undated) DEVICE — MERCY HEALTH ST CHARLES: Brand: MEDLINE INDUSTRIES, INC.

## (undated) DEVICE — KIT THERMOABLATION 6MM ENDOMET DEV NOVASURE

## (undated) DEVICE — SPONGE GZ W6XL6.75IN FLUF FOR PRE OP PREPPING CLN BULKEE II

## (undated) DEVICE — SYRINGE MED 50ML LUERLOCK TIP

## (undated) DEVICE — GLOVE ORANGE PI 7   MSG9070

## (undated) DEVICE — STRIP,CLOSURE,WOUND,MEDI-STRIP,1/2X4: Brand: MEDLINE

## (undated) DEVICE — ADAPTER,CATHETER/SYRINGE/LUER,STERILE: Brand: MEDLINE

## (undated) DEVICE — CANNULA IV 18GA L15IN BLNT FILL LUERLOCK HUB MJCT

## (undated) DEVICE — POLYP TRAP: Brand: TRAPEASE®

## (undated) DEVICE — 4-PORT MANIFOLD: Brand: NEPTUNE 2

## (undated) DEVICE — BASIN EMSIS 700ML GRAPHITE PLAS KID SHP GRAD

## (undated) DEVICE — Device: Brand: DEFENDO VALVE AND CONNECTOR KIT

## (undated) DEVICE — CONTAINER,SPECIMEN,4OZ,OR STRL: Brand: MEDLINE

## (undated) DEVICE — SINGLE-USE BIOPSY FORCEPS: Brand: RADIAL JAW 4

## (undated) DEVICE — TROCAR: Brand: KII FIOS FIRST ENTRY

## (undated) DEVICE — INSUFFLATION NEEDLE TO ESTABLISH PNEUMOPERITONEUM.: Brand: INSUFFLATION NEEDLE

## (undated) DEVICE — SUTURE MCRYL + SZ 4-0 L27IN ABSRB UD L19MM PS-2 3/8 CIR MCP426H

## (undated) DEVICE — BLANKET WRM W29.9XL79.1IN UP BODY FORC AIR MISTRAL-AIR

## (undated) DEVICE — SEALER LAP L37CM MARYLAND JAW OPN NANO COAT MULTIFUNCTIONAL

## (undated) DEVICE — Z DUPLICATE USE 2847003 INJECTOR UTER

## (undated) DEVICE — JELLY,LUBE,STERILE,FLIP TOP,TUBE,2-OZ: Brand: MEDLINE

## (undated) DEVICE — GOWN,POLY REINFORCED,LG: Brand: MEDLINE

## (undated) DEVICE — FLUID MGMT SYS FLUENT KIT 6/PK

## (undated) DEVICE — SOLUTION IRRIG 1000ML STRL H2O USP PLAS POUR BTL

## (undated) DEVICE — SYRINGE MED 10ML LUERLOCK TIP W/O SFTY DISP

## (undated) DEVICE — BITEBLOCK 54FR W/ DENT RIM BLOX

## (undated) DEVICE — CONNECTOR TBNG AUX H2O JET DISP FOR OLY 160/180 SER

## (undated) DEVICE — GAUZE,SPONGE,3"X3",4PLY,NS,LF: Brand: MEDLINE

## (undated) DEVICE — ST CHARLES GYN LAPAROSCOPY PK: Brand: MEDLINE INDUSTRIES, INC.

## (undated) DEVICE — DRESSING TRNSPAR W2XL2.75IN FLM SHT SEMIPERMEABLE WIND

## (undated) DEVICE — TUBING, SUCTION, 3/16" X 10', STRAIGHT: Brand: MEDLINE

## (undated) DEVICE — TROCAR: Brand: KII® SLEEVE

## (undated) DEVICE — SET,IRRIGATION,CYSTO/TUR,90": Brand: MEDLINE

## (undated) DEVICE — ST CHARLES PERI-GYN PACK: Brand: MEDLINE INDUSTRIES, INC.